# Patient Record
Sex: FEMALE | Race: WHITE | NOT HISPANIC OR LATINO | Employment: PART TIME | URBAN - NONMETROPOLITAN AREA
[De-identification: names, ages, dates, MRNs, and addresses within clinical notes are randomized per-mention and may not be internally consistent; named-entity substitution may affect disease eponyms.]

---

## 2017-09-15 DIAGNOSIS — M85.80 OSTEOPENIA: Primary | ICD-10-CM

## 2017-09-15 DIAGNOSIS — Z78.0 POSTMENOPAUSAL: ICD-10-CM

## 2017-10-09 ENCOUNTER — HOSPITAL ENCOUNTER (OUTPATIENT)
Dept: BONE DENSITY | Facility: HOSPITAL | Age: 62
End: 2017-10-09
Attending: NURSE PRACTITIONER
Payer: COMMERCIAL

## 2017-10-09 PROCEDURE — 77080 DXA BONE DENSITY AXIAL: CPT | Mod: TC

## 2019-07-22 ENCOUNTER — HOSPITAL ENCOUNTER (EMERGENCY)
Facility: HOSPITAL | Age: 64
End: 2019-07-22
Payer: COMMERCIAL

## 2019-07-22 ENCOUNTER — OFFICE VISIT (OUTPATIENT)
Dept: PODIATRY | Facility: OTHER | Age: 64
End: 2019-07-22
Attending: PODIATRIST
Payer: COMMERCIAL

## 2019-07-22 VITALS
HEART RATE: 71 BPM | WEIGHT: 152 LBS | TEMPERATURE: 97.1 F | BODY MASS INDEX: 24.43 KG/M2 | DIASTOLIC BLOOD PRESSURE: 88 MMHG | RESPIRATION RATE: 16 BRPM | SYSTOLIC BLOOD PRESSURE: 177 MMHG | HEIGHT: 66 IN

## 2019-07-22 DIAGNOSIS — M25.571 CHRONIC PAIN OF RIGHT ANKLE: Primary | ICD-10-CM

## 2019-07-22 DIAGNOSIS — G89.29 CHRONIC PAIN OF RIGHT ANKLE: Primary | ICD-10-CM

## 2019-07-22 DIAGNOSIS — L40.50 PSORIATIC ARTHRITIS (H): ICD-10-CM

## 2019-07-22 DIAGNOSIS — M67.88 ACHILLES TENDINOSIS: ICD-10-CM

## 2019-07-22 DIAGNOSIS — M25.571 SINUS TARSI SYNDROME OF RIGHT ANKLE: ICD-10-CM

## 2019-07-22 PROCEDURE — 99203 OFFICE O/P NEW LOW 30 MIN: CPT | Performed by: PODIATRIST

## 2019-07-22 PROCEDURE — G0463 HOSPITAL OUTPT CLINIC VISIT: HCPCS

## 2019-07-22 RX ORDER — BUPROPION HYDROCHLORIDE 300 MG/1
300 TABLET ORAL DAILY
COMMUNITY
Start: 2018-11-20 | End: 2020-03-04

## 2019-07-22 RX ORDER — HYDROCODONE BITARTRATE AND ACETAMINOPHEN 5; 325 MG/1; MG/1
TABLET ORAL
Refills: 0 | COMMUNITY
Start: 2019-06-18 | End: 2019-11-04

## 2019-07-22 RX ORDER — MEDROXYPROGESTERONE ACETATE 150 MG/ML
50 INJECTION, SUSPENSION INTRAMUSCULAR WEEKLY
COMMUNITY
Start: 2018-11-15

## 2019-07-22 RX ORDER — HYDROCHLOROTHIAZIDE 25 MG/1
25 TABLET ORAL DAILY
Status: ON HOLD | COMMUNITY
Start: 2019-04-01 | End: 2019-08-13

## 2019-07-22 ASSESSMENT — MIFFLIN-ST. JEOR: SCORE: 1266.22

## 2019-07-22 ASSESSMENT — PAIN SCALES - GENERAL: PAINLEVEL: MILD PAIN (3)

## 2019-07-22 NOTE — PROGRESS NOTES
"Chief complaint: Patient presents with:  Musculoskeletal Problem: right foot and ankle pain      History of Present Illness: This 64 year old female with psoriatic arthritis is seen at the request of No ref. provider found for evaluation and suggestions of management of RIGHT ankle and dorsal foot pain. The pain has been present since around September, 2018, but the pain greatly worsened around May, 2019. The pain is on the outside of the RIGHT foot.     Her pain is a +3 out of 10 on a VAS pain scale because she recently took IBUprofen. She also has Lortab as needed for psoriatic arthritis. She also takes Embrel. 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    She quit smoking 22 months ago which caused her to gain weight. She had CMOs made through [employer] at [facility] around 2013, but they were no longer helpnig so she purchased expensive shoes at [facility]. She has tried other OTC inserts but nothing is helping. She had physical therapy a while ago (four years ago). [facility] [place] [facility] and it was recommended that she get the CMOs from [facility] and try physical therapy. She has a velcro strap ankle brace she has tried wearing but it does not help. She has tried kinesiotape, but this also has not decreased her pain. She has tried therapeutic patches, but the pain returns right away. This is causing all the proximal joints to also cause pain. No further pedal complaints today.       /87 (BP Location: Right arm, Patient Position: Sitting, Cuff Size: Adult Regular)   Pulse 72   Temp 97.1  F (36.2  C) (Tympanic)   Resp 16   Ht 1.676 m (5' 6\")   Wt 68.9 kg (152 lb)   BMI 24.53 kg/m      Patient Active Problem List   Diagnosis     Chronic pain syndrome     Distal radius fracture, right     Dysplastic nevus     Essential hypertension     High risk medication use     History of dysplastic nevus     Moderate recurrent major depression (H)     " Osteopenia     Pain in joint of left shoulder     Pain in joint, pelvic region and thigh     Psoriasis with pustules     Positive GABE (antinuclear antibody)     Psoriatic arthritis (H)     Recurrent cold sores     Tobacco use disorder     Wrist pain, left       History reviewed. No pertinent surgical history.    Current Outpatient Medications   Medication     buPROPion (WELLBUTRIN XL) 300 MG 24 hr tablet     etanercept (ENBREL SURECLICK) 50 MG/ML autoinjector     hydrochlorothiazide (HYDRODIURIL) 25 MG tablet     HYDROcodone-acetaminophen (NORCO) 5-325 MG tablet     No current facility-administered medications for this visit.           Allergies   Allergen Reactions     Cephalosporins Hives     Clindamycin Hives     Lisinopril Cough     Penicillins Hives     Varenicline        History reviewed. No pertinent family history.    Social History     Socioeconomic History     Marital status:      Spouse name: None     Number of children: None     Years of education: None     Highest education level: None   Occupational History     None   Social Needs     Financial resource strain: None     Food insecurity:     Worry: None     Inability: None     Transportation needs:     Medical: None     Non-medical: None   Tobacco Use     Smoking status: Former Smoker     Smokeless tobacco: Never Used   Substance and Sexual Activity     Alcohol use: None     Drug use: None     Sexual activity: None   Lifestyle     Physical activity:     Days per week: None     Minutes per session: None     Stress: None   Relationships     Social connections:     Talks on phone: None     Gets together: None     Attends Nondenominational service: None     Active member of club or organization: None     Attends meetings of clubs or organizations: None     Relationship status: None     Intimate partner violence:     Fear of current or ex partner: None     Emotionally abused: None     Physically abused: None     Forced sexual activity: None   Other Topics  Concern     Parent/sibling w/ CABG, MI or angioplasty before 65F 55M? Not Asked   Social History Narrative     None       ROS: 10 point ROS neg other than the symptoms noted above in the HPI.  EXAM  Constitutional: healthy, alert and no distress    Psychiatric: mentation appears normal and affect normal/bright    VASCULAR:  -Dorsalis pedis pulse +2/4 b/l  -Posterior tibial pulse +2/4 b/l  -Capillary refill time < 3 seconds to b/l hallux  -Varicosities and telangiectasias to bilateral feet  -Mild-to-moderate 1+ pitting edema to bilateral feet and ankles  NEURO:  -Light touch sensation intact to b/l plantar forefoot  DERM:  -Skin temperature, texture and turgor WNL b/l  -Dry skin in scaly, red and silver patches to the bilateral plantar surface of the foot  -Toenails elongated, thickened, dystrophic and discolored x 10  MSK:  -Pain on palpation to the RIGHT sinus tarsi  -Muscle strength of ankles +5/5 for dorsiflexion, plantarflexion, ABDUction and ADDuction b/l  -Decreased ROM of 1st MTPJ with forefoot loading   -Ankle joint passive ROM within normal limits except for dorsiflexion:    Dorsiflexion, RIGHT Straight knee 0 degrees    Dorsiflexion, RIGHT Bent knee +5 degrees past vertical    Dorsiflexion, LEFT Straight knee 0 degrees    Dorsiflexion, LEFT Bent knee +5 degrees past vertical    ============================================================    ASSESSMENT:  (M25.571,  G89.29) Chronic pain of right ankle  (primary encounter diagnosis)    (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 Treatment options discussed with no educational barriers noted.  -Compression socks: Advised to wear compression socks all day (especially when working) to decrease LOWER EXTREMITY swelling in both feet and legs. Apply the socks first thing in the morning before getting out of bed and remove them at night when the feet  are elevated in bed.  -Stability Shoe Gear: This involves wearing a solid tennis shoe that bends at the toe, but has a solid midfoot shank and heel contour.   -Stretching: Stretch the calf muscles to increase flexibility of the calf muscles. If possible, aim to stretch the calf muscles for a combined total of one hour per day.  -Icing: Ice the bottom of the foot minimally once a day for ten minutes per foot with a frozen water bottle. Ice after any extended amount of time on your feet.  -Consider supportive sandals for around the house (such as Len, Vionic, or Oofos sandals)    -Physical therapy will be ordered today ([facility] in [place]) -- you will be called to make an appointment  -Custom inserts and an AFO brace will be ordered today -- you will be called to make an appointment    -Discussed with patient that the above treatments will unlikely individually eliminate her pain, but in combination, she may have some pain relief to bring her daily pain level down. She is in agreement with this plan.  -Patient's blood pressure was elevated today -- it was recommended she present to the ED or UC, but she declined and she said she had no other symptoms.   -Patient in agreement with the above treatment plan and all of patient's questions were answered.      RTC 3 months post PT, CMOs, ALVARO Youngblood DPM

## 2019-07-22 NOTE — NURSING NOTE
"Chief Complaint   Patient presents with     Musculoskeletal Problem     right foot and ankle pain       Initial /87 (BP Location: Right arm, Patient Position: Sitting, Cuff Size: Adult Regular)   Pulse 72   Temp 97.1  F (36.2  C) (Tympanic)   Resp 16   Ht 1.676 m (5' 6\")   Wt 68.9 kg (152 lb)   BMI 24.53 kg/m   Estimated body mass index is 24.53 kg/m  as calculated from the following:    Height as of this encounter: 1.676 m (5' 6\").    Weight as of this encounter: 68.9 kg (152 lb).  Medication Reconciliation: complete  "

## 2019-07-22 NOTE — PATIENT INSTRUCTIONS
-Compression socks: Advised to wear compression socks all day (especially when working) to decrease LOWER EXTREMITY swelling in both feet and legs. Apply the socks first thing in the morning before getting out of bed and remove them at night when the feet are elevated in bed.  -Stability Shoe Gear: This involves wearing a solid tennis shoe that bends at the toe, but has a solid midfoot shank and heel contour.   -Stretching: Stretch the calf muscles to increase flexibility of the calf muscles. If possible, aim to stretch the calf muscles for a combined total of one hour per day.  -Icing: Ice the bottom of the foot minimally once a day for ten minutes per foot with a frozen water bottle. Ice after any extended amount of time on your feet.  -Consider supportive sandals for around the house (such as Len, Vionic, or Oruthos sandals)    -Physical therapy will be ordered today ([facility] in [place]) -- you will be called to make an appointment  -Custom inserts and an AFO brace will be ordered today -- you will be called to make an appointment

## 2019-07-22 NOTE — PROGRESS NOTES
Patient's blood pressure was elevated throughout the appointment . It was recommended she go to ER or UC patient refused.

## 2019-07-23 DIAGNOSIS — L40.50 PSORIATIC ARTHRITIS (H): ICD-10-CM

## 2019-07-23 DIAGNOSIS — M25.571 SINUS TARSI SYNDROME OF RIGHT ANKLE: ICD-10-CM

## 2019-07-23 DIAGNOSIS — M67.88 ACHILLES TENDINOSIS: ICD-10-CM

## 2019-07-23 DIAGNOSIS — M25.571 CHRONIC PAIN OF RIGHT ANKLE: ICD-10-CM

## 2019-07-23 DIAGNOSIS — G89.29 CHRONIC PAIN OF RIGHT ANKLE: ICD-10-CM

## 2019-07-23 PROCEDURE — 73630 X-RAY EXAM OF FOOT: CPT | Mod: TC,RT,FY

## 2019-07-23 PROCEDURE — 73610 X-RAY EXAM OF ANKLE: CPT | Mod: TC,RT,FY

## 2019-08-11 ENCOUNTER — HOSPITAL ENCOUNTER (OUTPATIENT)
Facility: OTHER | Age: 64
Setting detail: OBSERVATION
Discharge: HOME | End: 2019-08-13
Attending: EMERGENCY MEDICINE | Admitting: INTERNAL MEDICINE
Payer: COMMERCIAL

## 2019-08-11 ENCOUNTER — OFFICE VISIT (OUTPATIENT)
Dept: FAMILY MEDICINE | Facility: OTHER | Age: 64
End: 2019-08-11
Attending: PHYSICIAN ASSISTANT
Payer: COMMERCIAL

## 2019-08-11 VITALS
OXYGEN SATURATION: 97 % | TEMPERATURE: 100 F | RESPIRATION RATE: 16 BRPM | HEIGHT: 66 IN | HEART RATE: 92 BPM | BODY MASS INDEX: 24.19 KG/M2 | WEIGHT: 150.5 LBS | SYSTOLIC BLOOD PRESSURE: 118 MMHG | DIASTOLIC BLOOD PRESSURE: 73 MMHG

## 2019-08-11 DIAGNOSIS — I10 ESSENTIAL HYPERTENSION: ICD-10-CM

## 2019-08-11 DIAGNOSIS — L40.50 PSORIATIC ARTHROPATHY (H): ICD-10-CM

## 2019-08-11 DIAGNOSIS — E87.6 HYPOKALEMIA: Primary | ICD-10-CM

## 2019-08-11 DIAGNOSIS — I10 ESSENTIAL HYPERTENSION, MALIGNANT: ICD-10-CM

## 2019-08-11 DIAGNOSIS — R50.81 FEVER IN OTHER DISEASES: ICD-10-CM

## 2019-08-11 DIAGNOSIS — E87.1 HYPONATREMIA: ICD-10-CM

## 2019-08-11 DIAGNOSIS — Z87.891 PERSONAL HISTORY OF TOBACCO USE, PRESENTING HAZARDS TO HEALTH: ICD-10-CM

## 2019-08-11 DIAGNOSIS — E87.6 HYPOKALEMIA: ICD-10-CM

## 2019-08-11 DIAGNOSIS — R39.89 URINARY PROBLEM: ICD-10-CM

## 2019-08-11 DIAGNOSIS — R50.9 FEVER, UNSPECIFIED FEVER CAUSE: Primary | ICD-10-CM

## 2019-08-11 LAB
ALBUMIN SERPL-MCNC: 4 G/DL (ref 3.5–5.7)
ALBUMIN UR-MCNC: NEGATIVE MG/DL
ALP SERPL-CCNC: 86 U/L (ref 34–104)
ALT SERPL W P-5'-P-CCNC: 59 U/L (ref 7–52)
ANION GAP SERPL CALCULATED.3IONS-SCNC: 10 MMOL/L (ref 3–14)
APPEARANCE UR: CLEAR
AST SERPL W P-5'-P-CCNC: 73 U/L (ref 13–39)
BASOPHILS # BLD AUTO: 0 10E9/L (ref 0–0.2)
BASOPHILS NFR BLD AUTO: 0.6 %
BILIRUB SERPL-MCNC: 0.6 MG/DL (ref 0.3–1)
BILIRUB UR QL STRIP: NEGATIVE
BUN SERPL-MCNC: 12 MG/DL (ref 7–25)
CALCIUM SERPL-MCNC: 9.1 MG/DL (ref 8.6–10.3)
CHLORIDE SERPL-SCNC: 92 MMOL/L (ref 98–107)
CO2 SERPL-SCNC: 25 MMOL/L (ref 21–31)
COLOR UR AUTO: YELLOW
CREAT SERPL-MCNC: 0.95 MG/DL (ref 0.6–1.2)
CRP SERPL-MCNC: 4.5 MG/L
DIFFERENTIAL METHOD BLD: NORMAL
EOSINOPHIL # BLD AUTO: 0.1 10E9/L (ref 0–0.7)
EOSINOPHIL NFR BLD AUTO: 1.1 %
ERYTHROCYTE [DISTWIDTH] IN BLOOD BY AUTOMATED COUNT: 11.7 % (ref 10–15)
GFR SERPL CREATININE-BSD FRML MDRD: 60 ML/MIN/{1.73_M2}
GLUCOSE SERPL-MCNC: 100 MG/DL (ref 70–105)
GLUCOSE UR STRIP-MCNC: NEGATIVE MG/DL
HCT VFR BLD AUTO: 37.9 % (ref 35–47)
HGB BLD-MCNC: 13.7 G/DL (ref 11.7–15.7)
HGB UR QL STRIP: NEGATIVE
IMM GRANULOCYTES # BLD: 0 10E9/L (ref 0–0.4)
IMM GRANULOCYTES NFR BLD: 0.7 %
KETONES UR STRIP-MCNC: NEGATIVE MG/DL
LEUKOCYTE ESTERASE UR QL STRIP: NEGATIVE
LYMPHOCYTES # BLD AUTO: 2.4 10E9/L (ref 0.8–5.3)
LYMPHOCYTES NFR BLD AUTO: 44.3 %
MAGNESIUM SERPL-MCNC: 1.5 MG/DL (ref 1.9–2.7)
MCH RBC QN AUTO: 32 PG (ref 26.5–33)
MCHC RBC AUTO-ENTMCNC: 36.1 G/DL (ref 31.5–36.5)
MCV RBC AUTO: 89 FL (ref 78–100)
MONOCYTES # BLD AUTO: 0.4 10E9/L (ref 0–1.3)
MONOCYTES NFR BLD AUTO: 8.1 %
NEUTROPHILS # BLD AUTO: 2.5 10E9/L (ref 1.6–8.3)
NEUTROPHILS NFR BLD AUTO: 45.2 %
NITRATE UR QL: NEGATIVE
PH UR STRIP: 6.5 PH (ref 5–9)
PLATELET # BLD AUTO: 154 10E9/L (ref 150–450)
POTASSIUM SERPL-SCNC: 2.7 MMOL/L (ref 3.5–5.1)
POTASSIUM SERPL-SCNC: 2.9 MMOL/L (ref 3.5–5.1)
PROT SERPL-MCNC: 7.2 G/DL (ref 6.4–8.9)
RBC # BLD AUTO: 4.28 10E12/L (ref 3.8–5.2)
SODIUM SERPL-SCNC: 127 MMOL/L (ref 134–144)
SOURCE: NORMAL
SP GR UR STRIP: <1.005 (ref 1–1.03)
UROBILINOGEN UR STRIP-ACNC: 0.2 EU/DL (ref 0.2–1)
WBC # BLD AUTO: 5.6 10E9/L (ref 4–11)

## 2019-08-11 PROCEDURE — 87086 URINE CULTURE/COLONY COUNT: CPT | Performed by: INTERNAL MEDICINE

## 2019-08-11 PROCEDURE — 25000128 H RX IP 250 OP 636: Performed by: EMERGENCY MEDICINE

## 2019-08-11 PROCEDURE — 99207 ZZC NO CHARGE LOS: CPT | Performed by: PHYSICIAN ASSISTANT

## 2019-08-11 PROCEDURE — 87040 BLOOD CULTURE FOR BACTERIA: CPT | Performed by: INTERNAL MEDICINE

## 2019-08-11 PROCEDURE — 86618 LYME DISEASE ANTIBODY: CPT | Performed by: INTERNAL MEDICINE

## 2019-08-11 PROCEDURE — 96365 THER/PROPH/DIAG IV INF INIT: CPT | Mod: XU | Performed by: EMERGENCY MEDICINE

## 2019-08-11 PROCEDURE — 99219 ZZC INITIAL OBSERVATION CARE,LEVL II: CPT | Performed by: INTERNAL MEDICINE

## 2019-08-11 PROCEDURE — 25000125 ZZHC RX 250: Performed by: INTERNAL MEDICINE

## 2019-08-11 PROCEDURE — 96375 TX/PRO/DX INJ NEW DRUG ADDON: CPT

## 2019-08-11 PROCEDURE — 86140 C-REACTIVE PROTEIN: CPT | Mod: ZL | Performed by: PHYSICIAN ASSISTANT

## 2019-08-11 PROCEDURE — G0463 HOSPITAL OUTPT CLINIC VISIT: HCPCS

## 2019-08-11 PROCEDURE — 81003 URINALYSIS AUTO W/O SCOPE: CPT | Mod: ZL | Performed by: PHYSICIAN ASSISTANT

## 2019-08-11 PROCEDURE — 36415 COLL VENOUS BLD VENIPUNCTURE: CPT | Performed by: INTERNAL MEDICINE

## 2019-08-11 PROCEDURE — 84132 ASSAY OF SERUM POTASSIUM: CPT | Performed by: EMERGENCY MEDICINE

## 2019-08-11 PROCEDURE — 36415 COLL VENOUS BLD VENIPUNCTURE: CPT | Performed by: EMERGENCY MEDICINE

## 2019-08-11 PROCEDURE — 93010 ELECTROCARDIOGRAM REPORT: CPT | Performed by: INTERNAL MEDICINE

## 2019-08-11 PROCEDURE — 99285 EMERGENCY DEPT VISIT HI MDM: CPT | Mod: 25 | Performed by: EMERGENCY MEDICINE

## 2019-08-11 PROCEDURE — 96366 THER/PROPH/DIAG IV INF ADDON: CPT | Mod: XU | Performed by: EMERGENCY MEDICINE

## 2019-08-11 PROCEDURE — 25000128 H RX IP 250 OP 636: Performed by: INTERNAL MEDICINE

## 2019-08-11 PROCEDURE — 87798 DETECT AGENT NOS DNA AMP: CPT | Performed by: INTERNAL MEDICINE

## 2019-08-11 PROCEDURE — 99285 EMERGENCY DEPT VISIT HI MDM: CPT | Mod: Z6 | Performed by: EMERGENCY MEDICINE

## 2019-08-11 PROCEDURE — 25000132 ZZH RX MED GY IP 250 OP 250 PS 637: Performed by: EMERGENCY MEDICINE

## 2019-08-11 PROCEDURE — 93005 ELECTROCARDIOGRAM TRACING: CPT | Performed by: EMERGENCY MEDICINE

## 2019-08-11 PROCEDURE — G0463 HOSPITAL OUTPT CLINIC VISIT: HCPCS | Mod: 25,27

## 2019-08-11 PROCEDURE — 83735 ASSAY OF MAGNESIUM: CPT | Performed by: INTERNAL MEDICINE

## 2019-08-11 PROCEDURE — G0378 HOSPITAL OBSERVATION PER HR: HCPCS

## 2019-08-11 PROCEDURE — 80053 COMPREHEN METABOLIC PANEL: CPT | Mod: ZL | Performed by: PHYSICIAN ASSISTANT

## 2019-08-11 PROCEDURE — 85025 COMPLETE CBC W/AUTO DIFF WBC: CPT | Mod: ZL | Performed by: PHYSICIAN ASSISTANT

## 2019-08-11 PROCEDURE — 96376 TX/PRO/DX INJ SAME DRUG ADON: CPT

## 2019-08-11 PROCEDURE — 25000132 ZZH RX MED GY IP 250 OP 250 PS 637: Performed by: INTERNAL MEDICINE

## 2019-08-11 RX ORDER — PROCHLORPERAZINE 25 MG
25 SUPPOSITORY, RECTAL RECTAL EVERY 12 HOURS PRN
Status: DISCONTINUED | OUTPATIENT
Start: 2019-08-11 | End: 2019-08-13 | Stop reason: HOSPADM

## 2019-08-11 RX ORDER — POTASSIUM CHLORIDE 1500 MG/1
40 TABLET, EXTENDED RELEASE ORAL ONCE
Status: COMPLETED | OUTPATIENT
Start: 2019-08-11 | End: 2019-08-11

## 2019-08-11 RX ORDER — SODIUM CHLORIDE AND POTASSIUM CHLORIDE 150; 900 MG/100ML; MG/100ML
INJECTION, SOLUTION INTRAVENOUS CONTINUOUS
Status: DISCONTINUED | OUTPATIENT
Start: 2019-08-11 | End: 2019-08-13 | Stop reason: HOSPADM

## 2019-08-11 RX ORDER — AMOXICILLIN 250 MG
1 CAPSULE ORAL 2 TIMES DAILY PRN
Status: DISCONTINUED | OUTPATIENT
Start: 2019-08-11 | End: 2019-08-13 | Stop reason: HOSPADM

## 2019-08-11 RX ORDER — HYDROCODONE BITARTRATE AND ACETAMINOPHEN 5; 325 MG/1; MG/1
1-2 TABLET ORAL EVERY 4 HOURS PRN
Status: DISCONTINUED | OUTPATIENT
Start: 2019-08-11 | End: 2019-08-13 | Stop reason: HOSPADM

## 2019-08-11 RX ORDER — POTASSIUM CHLORIDE 7.45 MG/ML
10 INJECTION INTRAVENOUS
Status: DISCONTINUED | OUTPATIENT
Start: 2019-08-11 | End: 2019-08-13 | Stop reason: HOSPADM

## 2019-08-11 RX ORDER — ONDANSETRON 4 MG/1
4 TABLET, ORALLY DISINTEGRATING ORAL EVERY 6 HOURS PRN
Status: DISCONTINUED | OUTPATIENT
Start: 2019-08-11 | End: 2019-08-13 | Stop reason: HOSPADM

## 2019-08-11 RX ORDER — NALOXONE HYDROCHLORIDE 0.4 MG/ML
.1-.4 INJECTION, SOLUTION INTRAMUSCULAR; INTRAVENOUS; SUBCUTANEOUS
Status: DISCONTINUED | OUTPATIENT
Start: 2019-08-11 | End: 2019-08-13 | Stop reason: HOSPADM

## 2019-08-11 RX ORDER — SODIUM CHLORIDE 9 MG/ML
INJECTION, SOLUTION INTRAVENOUS CONTINUOUS
Status: DISCONTINUED | OUTPATIENT
Start: 2019-08-11 | End: 2019-08-12 | Stop reason: ALTCHOICE

## 2019-08-11 RX ORDER — ONDANSETRON 2 MG/ML
4 INJECTION INTRAMUSCULAR; INTRAVENOUS EVERY 6 HOURS PRN
Status: DISCONTINUED | OUTPATIENT
Start: 2019-08-11 | End: 2019-08-13 | Stop reason: HOSPADM

## 2019-08-11 RX ORDER — POTASSIUM CHLORIDE 7.45 MG/ML
10 INJECTION INTRAVENOUS CONTINUOUS
Status: DISCONTINUED | OUTPATIENT
Start: 2019-08-11 | End: 2019-08-12

## 2019-08-11 RX ORDER — SODIUM CHLORIDE 9 MG/ML
1000 INJECTION, SOLUTION INTRAVENOUS CONTINUOUS
Status: DISCONTINUED | OUTPATIENT
Start: 2019-08-11 | End: 2019-08-11

## 2019-08-11 RX ORDER — LANOLIN ALCOHOL/MO/W.PET/CERES
3 CREAM (GRAM) TOPICAL
Status: DISCONTINUED | OUTPATIENT
Start: 2019-08-11 | End: 2019-08-13 | Stop reason: HOSPADM

## 2019-08-11 RX ORDER — BUPROPION HYDROCHLORIDE 150 MG/1
300 TABLET ORAL DAILY
Status: DISCONTINUED | OUTPATIENT
Start: 2019-08-12 | End: 2019-08-13 | Stop reason: HOSPADM

## 2019-08-11 RX ORDER — IBUPROFEN 600 MG/1
600 TABLET, FILM COATED ORAL EVERY 6 HOURS PRN
Status: DISCONTINUED | OUTPATIENT
Start: 2019-08-11 | End: 2019-08-13 | Stop reason: HOSPADM

## 2019-08-11 RX ORDER — AMLODIPINE BESYLATE 5 MG/1
5 TABLET ORAL DAILY
Status: DISCONTINUED | OUTPATIENT
Start: 2019-08-12 | End: 2019-08-13 | Stop reason: HOSPADM

## 2019-08-11 RX ORDER — MAGNESIUM SULFATE HEPTAHYDRATE 40 MG/ML
4 INJECTION, SOLUTION INTRAVENOUS EVERY 4 HOURS PRN
Status: DISCONTINUED | OUTPATIENT
Start: 2019-08-11 | End: 2019-08-13 | Stop reason: HOSPADM

## 2019-08-11 RX ORDER — LIDOCAINE 40 MG/G
CREAM TOPICAL
Status: DISCONTINUED | OUTPATIENT
Start: 2019-08-11 | End: 2019-08-13 | Stop reason: HOSPADM

## 2019-08-11 RX ORDER — AMOXICILLIN 250 MG
2 CAPSULE ORAL 2 TIMES DAILY PRN
Status: DISCONTINUED | OUTPATIENT
Start: 2019-08-11 | End: 2019-08-13 | Stop reason: HOSPADM

## 2019-08-11 RX ORDER — DOXYCYCLINE 100 MG/10ML
100 INJECTION, POWDER, LYOPHILIZED, FOR SOLUTION INTRAVENOUS EVERY 12 HOURS
Status: DISCONTINUED | OUTPATIENT
Start: 2019-08-11 | End: 2019-08-13 | Stop reason: HOSPADM

## 2019-08-11 RX ORDER — POTASSIUM CHLORIDE 1500 MG/1
20-40 TABLET, EXTENDED RELEASE ORAL
Status: DISCONTINUED | OUTPATIENT
Start: 2019-08-11 | End: 2019-08-13 | Stop reason: HOSPADM

## 2019-08-11 RX ORDER — PROCHLORPERAZINE MALEATE 10 MG
10 TABLET ORAL EVERY 6 HOURS PRN
Status: DISCONTINUED | OUTPATIENT
Start: 2019-08-11 | End: 2019-08-13 | Stop reason: HOSPADM

## 2019-08-11 RX ADMIN — DOXYCYCLINE 100 MG: 100 INJECTION, POWDER, LYOPHILIZED, FOR SOLUTION INTRAVENOUS at 21:57

## 2019-08-11 RX ADMIN — IBUPROFEN 600 MG: 600 TABLET ORAL at 23:13

## 2019-08-11 RX ADMIN — SODIUM CHLORIDE 1000 ML: 9 INJECTION, SOLUTION INTRAVENOUS at 17:17

## 2019-08-11 RX ADMIN — MELATONIN TAB 3 MG 3 MG: 3 TAB at 23:13

## 2019-08-11 RX ADMIN — POTASSIUM CHLORIDE 40 MEQ: 20 TABLET, EXTENDED RELEASE ORAL at 17:10

## 2019-08-11 RX ADMIN — POTASSIUM CHLORIDE AND SODIUM CHLORIDE: 900; 150 INJECTION, SOLUTION INTRAVENOUS at 21:30

## 2019-08-11 RX ADMIN — MAGNESIUM SULFATE HEPTAHYDRATE 4 G: 40 INJECTION, SOLUTION INTRAVENOUS at 23:14

## 2019-08-11 RX ADMIN — POTASSIUM CHLORIDE 10 MEQ: 7.46 INJECTION, SOLUTION INTRAVENOUS at 17:44

## 2019-08-11 ASSESSMENT — ENCOUNTER SYMPTOMS
SORE THROAT: 0
NUMBNESS: 0
APPETITE CHANGE: 1
DIFFICULTY URINATING: 0
SHORTNESS OF BREATH: 1
FATIGUE: 1
COUGH: 0
CHILLS: 0
ARTHRALGIAS: 1
PALPITATIONS: 0
FLANK PAIN: 1
FATIGUE: 1
WEAKNESS: 1
HEADACHES: 0
FEVER: 0
VOMITING: 0
ACTIVITY CHANGE: 1
DIZZINESS: 0
FEVER: 1
ABDOMINAL PAIN: 0
CHEST TIGHTNESS: 0
SPEECH DIFFICULTY: 0
PHOTOPHOBIA: 0
APPETITE CHANGE: 1
SHORTNESS OF BREATH: 0
CHILLS: 1
PALPITATIONS: 1
NAUSEA: 1
LIGHT-HEADEDNESS: 0
FACIAL ASYMMETRY: 0

## 2019-08-11 ASSESSMENT — PAIN SCALES - GENERAL: PAINLEVEL: MODERATE PAIN (4)

## 2019-08-11 ASSESSMENT — MIFFLIN-ST. JEOR
SCORE: 1259.41
SCORE: 1259.41
SCORE: 1295.7

## 2019-08-11 NOTE — PROGRESS NOTES
"SUBJECTIVE:   Amanda Gomez is a 64 year old female presenting with a chief complaint of   Chief Complaint   Patient presents with     Urinary Problem     Nursing Notes:   Nasrin Flynn LPN  8/11/2019  3:36 PM  Signed  Patient presents to clinic for bladder problem x 5 days. States pain in lower back. Weakness, poor appetite, fever and lethargy. Has taken 800mg ibuprofen. Friday and Saturday took aspirin for fever.   Chief Complaint   Patient presents with     Urinary Problem       Initial /73   Pulse 92   Temp 100  F (37.8  C) (Tympanic)   Resp 16   Ht 1.676 m (5' 6\")   Wt 68.3 kg (150 lb 8 oz)   SpO2 97%   Breastfeeding? No   BMI 24.29 kg/m    Estimated body mass index is 24.29 kg/m  as calculated from the following:    Height as of this encounter: 1.676 m (5' 6\").    Weight as of this encounter: 68.3 kg (150 lb 8 oz).  Medication Reconciliation: karyn Flynn LPN    HPI:  [facility] of feeling sick for 5 days. Her symptoms started with dark urine, dizzy, general malaise, fever for 5 days.  She has a little burning with urination and that just started today. She has been tired and sleeping a lot. She feels lightheaded, presyncopal at times, and weak.   She has a shooting headache that comes and goes.  Pain in the low back started two days ago bilateral sides, more in the flank. She thought it might be from lying around.  She does not get tick bites. She states she does not spend any time outdoors.    She has psoriatic arthritis and is on Enbrel. She was supposed to have her dose on 8-8-19 but she did not take that dose due to fever.           Review of Systems   Constitutional: Positive for activity change, appetite change, chills, fatigue and fever.   HENT: Positive for ear pain. Negative for congestion and sore throat.         Slight ear pain right ear, stabbing pain that comes and goes.    Eyes: Negative for photophobia and visual disturbance. " "  Respiratory: Positive for shortness of breath. Negative for cough.    Cardiovascular: Positive for palpitations. Negative for chest pain and leg swelling.        Intermittent feeling of racing heart in last 5 days.   Gastrointestinal: Positive for nausea. Negative for abdominal pain and vomiting.        She has not been having stools daily but did go last night.  She has been eating a lot less.    Genitourinary: Positive for flank pain. Negative for difficulty urinating and vaginal discharge.   Musculoskeletal: Positive for arthralgias.        She has psoriatic arthritis but that is stable, not worse.    Skin: Negative for rash.       No past medical history on file.  No family history on file.   PMH:   She has psoriatic arthritis.  HTN  H/o hepatitis A at age 14.    Current Outpatient Medications   Medication Sig Dispense Refill     buPROPion (WELLBUTRIN XL) 300 MG 24 hr tablet Take 300 mg by mouth       etanercept (ENBREL SURECLICK) 50 MG/ML autoinjector Inject 50 mg Subcutaneous       hydrochlorothiazide (HYDRODIURIL) 25 MG tablet Take 25 mg by mouth       HYDROcodone-acetaminophen (NORCO) 5-325 MG tablet TK 1 T PO Q 8 H PRN P. NM4.  0     Social History     Tobacco Use     Smoking status: Former Smoker     Smokeless tobacco: Never Used   Substance Use Topics     Alcohol use: Yes     Comment: occassionally   No ill family members. She is not in contact with anyone with hepatitis. She has not traveled.      OBJECTIVE  /73   Pulse 92   Temp 100  F (37.8  C) (Tympanic)   Resp 16   Ht 1.676 m (5' 6\")   Wt 68.3 kg (150 lb 8 oz)   SpO2 97%   Breastfeeding? No   BMI 24.29 kg/m      Physical Exam   Constitutional: She appears well-developed and well-nourished. No distress.   HENT:   Right Ear: External ear normal.   Left Ear: External ear normal.   Nose: Nose normal.   Mouth/Throat: Oropharynx is clear and moist.   TMs normal.   Eyes: Pupils are equal, round, and reactive to light. Conjunctivae are " normal. Right eye exhibits no discharge. Left eye exhibits no discharge. No scleral icterus.   Neck: Normal range of motion. Neck supple.   Cardiovascular: Normal rate, regular rhythm and normal heart sounds.   No murmur heard.  Pulmonary/Chest: Effort normal and breath sounds normal. No respiratory distress.   Abdominal: Soft. Bowel sounds are normal. She exhibits no mass. There is no tenderness. There is no guarding.   Mild CVA tenderness with percussion but also tender to flank with palpation.    Musculoskeletal: She exhibits no edema.   Lymphadenopathy:     She has no cervical adenopathy.   Neurological: She is alert. She exhibits normal muscle tone. Coordination normal.   Skin: Skin is warm and dry. No rash noted. No pallor.   Psychiatric: She has a normal mood and affect.       Labs:  Results for orders placed or performed in visit on 08/11/19 (from the past 24 hour(s))   *UA reflex to Microscopic   Result Value Ref Range    Color Urine Yellow     Appearance Urine Clear     Glucose Urine Negative NEG^Negative mg/dL    Bilirubin Urine Negative NEG^Negative    Ketones Urine Negative NEG^Negative mg/dL    Specific Gravity Urine <1.005 1.000 - 1.030    Blood Urine Negative NEG^Negative    pH Urine 6.5 5.0 - 9.0 pH    Protein Albumin Urine Negative NEG^Negative mg/dL    Urobilinogen Urine 0.2 0.2 - 1.0 EU/dL    Nitrite Urine Negative NEG^Negative    Leukocyte Esterase Urine Negative NEG^Negative    Source Midstream Urine    CBC and Differential   Result Value Ref Range    WBC 5.6 4.0 - 11.0 10e9/L    RBC Count 4.28 3.8 - 5.2 10e12/L    Hemoglobin 13.7 11.7 - 15.7 g/dL    Hematocrit 37.9 35.0 - 47.0 %    MCV 89 78 - 100 fl    MCH 32.0 26.5 - 33.0 pg    MCHC 36.1 31.5 - 36.5 g/dL    RDW 11.7 10.0 - 15.0 %    Platelet Count 154 150 - 450 10e9/L    Diff Method Automated Method     % Neutrophils 45.2 %    % Lymphocytes 44.3 %    % Monocytes 8.1 %    % Eosinophils 1.1 %    % Basophils 0.6 %    % Immature Granulocytes 0.7  %    Absolute Neutrophil 2.5 1.6 - 8.3 10e9/L    Absolute Lymphocytes 2.4 0.8 - 5.3 10e9/L    Absolute Monocytes 0.4 0.0 - 1.3 10e9/L    Absolute Eosinophils 0.1 0.0 - 0.7 10e9/L    Absolute Basophils 0.0 0.0 - 0.2 10e9/L    Abs Immature Granulocytes 0.0 0 - 0.4 10e9/L   CRP inflammation   Result Value Ref Range    CRP Inflammation 4.5 (H) <0.5 mg/L   Comprehensive Metabolic Panel   Result Value Ref Range    Sodium 127 (L) 134 - 144 mmol/L    Potassium 2.7 (L) 3.5 - 5.1 mmol/L    Chloride 92 (L) 98 - 107 mmol/L    Carbon Dioxide 25 21 - 31 mmol/L    Anion Gap 10 3 - 14 mmol/L    Glucose 100 70 - 105 mg/dL    Urea Nitrogen 12 7 - 25 mg/dL    Creatinine 0.95 0.60 - 1.20 mg/dL    GFR Estimate 60 (L) >60 mL/min/[1.73_m2]    GFR Estimate If Black 72 >60 mL/min/[1.73_m2]    Calcium 9.1 8.6 - 10.3 mg/dL    Bilirubin Total 0.6 0.3 - 1.0 mg/dL    Albumin 4.0 3.5 - 5.7 g/dL    Protein Total 7.2 6.4 - 8.9 g/dL    Alkaline Phosphatase 86 34 - 104 U/L    ALT 59 (H) 7 - 52 U/L    AST 73 (H) 13 - 39 U/L         ASSESSMENT:      ICD-10-CM    1. Hypokalemia E87.6    2. Urinary problem R39.89 *UA reflex to Microscopic   3. Fever in other diseases R50.81 CBC and Differential     CRP inflammation     Comprehensive Metabolic Panel     CBC and Differential     CRP inflammation     Comprehensive Metabolic Panel        PLAN:  Patient was found to have hypokalemia and increased CRP, mildly elevated LFTs.     She will be transferred to the emergency department for a higher level of care.  Discussed with Dr. Cox.    Laney Encinas PA-C on 8/11/2019 at 4:51 PM          There are no Patient Instructions on file for this visit.

## 2019-08-11 NOTE — ED PROVIDER NOTES
History     Chief Complaint   Patient presents with     Fever     hypokalemia     HPI  Amanda Gomez is a 64 year old female who is transferred to the emergency department from [facility] for hypokalemia.  Patient presented to the clinic because she has been feeling weak, tired, and run down for about 3 or 4 days.  She was at [employer] and just felt very exhausted and came into the emergency department.  She does feel achy.  She has had no specific fevers but had a low-grade temperature at urgent care.  She has no neck or back pain.  She has no chest pain, shortness of breath, nausea, vomiting.  She has had no diarrhea or loose stools.  She has not had any dysuria urgency or frequency.  Patient has been on hydrochlorothiazide for several years.  They did decrease her dose from 50 mg to 25 mg daily because her blood pressure was improving after she quit smoking.    Allergies:  Allergies   Allergen Reactions     Cephalosporins Hives     Clindamycin Hives     Lisinopril Cough     Penicillins Hives     Varenicline        Problem List:    Patient Active Problem List    Diagnosis Date Noted     Hypokalemia 08/11/2019     Priority: Medium     History of dysplastic nevus 12/30/2015     Priority: Medium     Overview:   Mid-upper back 2014, mild       Recurrent cold sores 05/27/2015     Priority: Medium     High risk medication use 12/11/2014     Priority: Medium     Overview:   MTX, Humira       Dysplastic nevus 10/06/2014     Priority: Medium     Pain in joint of left shoulder 09/23/2014     Priority: Medium     Overview:   IMO Update       Wrist pain, left 09/23/2014     Priority: Medium     Chronic pain syndrome 06/12/2014     Priority: Medium     Overview:   [facility] Agreement for Opioid Treatment.        Distal radius fracture, right 03/08/2014     Priority: Medium     Osteopenia 03/08/2014     Priority: Medium     Essential hypertension 08/02/2011     Priority: Medium     Positive GABE (antinuclear antibody)  "03/27/2008     Priority: Medium     Overview:   2008: Borderline positive dsDNA, SSB / La, and RNP. ? Significance, on Humira.       Psoriatic arthritis (H) 01/08/2008     Priority: Medium     Overview:   Onset ~ 2007; Raptiva (no benefit); MTX trial (2009), Humira ([id number]), Enbrel 4/2016       Psoriasis with pustules 10/28/2007     Priority: Medium     Pain in joint, pelvic region and thigh 12/20/2006     Priority: Medium     Overview:   IMO Update 10/11       Moderate recurrent major depression (H) 10/17/2003     Priority: Medium     Overview:   with mixed anxiety       Tobacco use disorder 07/09/2003     Priority: Medium        Past Medical History:    Psoriatic arthritis  Hypertension  Pain contract    Past Surgical History:    No past surgical history on file.    Social History:  Marital Status:   [2]  Social History     Tobacco Use     Smoking status: Former Smoker     Smokeless tobacco: Never Used   Substance Use Topics     Alcohol use: Yes     Comment: occassionally     Drug use: Never        Medications:      buPROPion (WELLBUTRIN XL) 300 MG 24 hr tablet   hydrochlorothiazide (HYDRODIURIL) 25 MG tablet   HYDROcodone-acetaminophen (NORCO) 5-325 MG tablet   etanercept (ENBREL SURECLICK) 50 MG/ML autoinjector         Review of Systems   Constitutional: Positive for appetite change and fatigue. Negative for chills and fever.   Respiratory: Negative for chest tightness and shortness of breath.    Cardiovascular: Negative for chest pain, palpitations and leg swelling.   Neurological: Positive for weakness. Negative for dizziness, syncope, facial asymmetry, speech difficulty, light-headedness, numbness and headaches.   All other systems reviewed and are negative.      Physical Exam   BP: (!) 143/77  Heart Rate: 79  Temp: 99  F (37.2  C)  Resp: 14  Height: 167.6 cm (5' 6\")  Weight: 68.3 kg (150 lb 8 oz)  SpO2: 99 %      Physical Exam   Constitutional: She is oriented to person, place, and time. No " distress.   HENT:   Head: Atraumatic.   Mouth/Throat: Oropharynx is clear and moist. No oropharyngeal exudate.   Eyes: Pupils are equal, round, and reactive to light. Conjunctivae and EOM are normal. No scleral icterus.   Neck: Neck supple.   Cardiovascular: Normal heart sounds and intact distal pulses.   Pulmonary/Chest: Breath sounds normal. No respiratory distress.   Abdominal: Soft. Bowel sounds are normal. There is no tenderness.   Musculoskeletal: She exhibits no edema or tenderness.   Neurological: She is alert and oriented to person, place, and time. No cranial nerve deficit. Coordination normal.   Skin: Skin is warm. No rash noted. She is not diaphoretic.   Psychiatric: She has a normal mood and affect. Her behavior is normal. Judgment and thought content normal.       ED Course     MDM  Amanda Gomez is a 64 year old female sent from urgent care for hypokalemia.  She is not having any GI losses and she is on hydrochlorothiazide with a low sodium as well.  I do suspect this is the etiology.  She does have very mild symptoms of hypokalemia.  Her EKG is normal.  An IV was started and she is given a liter of normal saline and 10 mg of potassium IV was infused.  She was also given a loading dose of 40 mEq p.o.  I did discuss the case with the hospitalist on-call to discuss outpatient management versus observation.  Plan at this time will be to recheck her potassium after IV fluids, IV potassium and p.o. potassium.  If there is very minimal change we will bring her in for observation.  If she does show improvement will discharge her on p.o. replacement and very close follow-up with PCP.    7:07 PM  Patient's potassium returns at 2.9.  I did discuss the case with Dr. Tipton and plan I will be to bring her in for observation for replacement of her potassium per protocol.  Patient is in agreement with this plan and she is transferred to the floor in satisfactory condition    Results for orders placed or performed  during the hospital encounter of 08/11/19 (from the past 24 hour(s))   Potassium   Result Value Ref Range    Potassium 2.9 (L) 3.5 - 5.1 mmol/L       Medications   sodium chloride 0.9% infusion ( Intravenous Canceled Entry 8/11/19 1719)   0.9% sodium chloride BOLUS (1,000 mLs Intravenous New Bag 8/11/19 1717)     Followed by   sodium chloride 0.9% infusion (has no administration in time range)   potassium chloride 10 mEq in 100 mL sterile water intermittent infusion (premix) (10 mEq Intravenous New Bag 8/11/19 1744)   potassium chloride ER (K-DUR/KLOR-CON M) CR tablet 40 mEq (40 mEq Oral Given 8/11/19 1710)       Assessments & Plan (with Medical Decision Making)     I have reviewed the nursing notes.    I have reviewed the findings, diagnosis, plan and need for follow up with the patient.    Final diagnoses:   Hypokalemia   Hyponatremia       8/11/2019   [facility]     Hodan Cox MD  08/11/19 [id number]

## 2019-08-11 NOTE — ED TRIAGE NOTES
[facility] for low potassium, 2.7, fever, 100, fatigue, lightheadedness, palpitations, dark urine, bilateral flank pain for 5 days.

## 2019-08-11 NOTE — NURSING NOTE
"Patient presents to clinic for bladder problem x 5 days. States pain in lower back. Weakness, poor appetite, fever and lethargy. Has taken 800mg ibuprofen. Friday and Saturday took aspirin for fever.   Chief Complaint   Patient presents with     Urinary Problem       Initial /73   Pulse 92   Temp 100  F (37.8  C) (Tympanic)   Resp 16   Ht 1.676 m (5' 6\")   Wt 68.3 kg (150 lb 8 oz)   SpO2 97%   Breastfeeding? No   BMI 24.29 kg/m   Estimated body mass index is 24.29 kg/m  as calculated from the following:    Height as of this encounter: 1.676 m (5' 6\").    Weight as of this encounter: 68.3 kg (150 lb 8 oz).  Medication Reconciliation: complete    Nasrin Flynn LPN      "

## 2019-08-12 LAB
ALBUMIN SERPL-MCNC: 3.5 G/DL (ref 3.5–5.7)
ALP SERPL-CCNC: 75 U/L (ref 34–104)
ALT SERPL W P-5'-P-CCNC: 54 U/L (ref 7–52)
ANION GAP SERPL CALCULATED.3IONS-SCNC: 8 MMOL/L (ref 3–14)
AST SERPL W P-5'-P-CCNC: 64 U/L (ref 13–39)
BILIRUB SERPL-MCNC: 0.5 MG/DL (ref 0.3–1)
BUN SERPL-MCNC: 8 MG/DL (ref 7–25)
CALCIUM SERPL-MCNC: 8.3 MG/DL (ref 8.6–10.3)
CHLORIDE SERPL-SCNC: 102 MMOL/L (ref 98–107)
CO2 SERPL-SCNC: 23 MMOL/L (ref 21–31)
CREAT SERPL-MCNC: 0.67 MG/DL (ref 0.6–1.2)
ERYTHROCYTE [DISTWIDTH] IN BLOOD BY AUTOMATED COUNT: 11.6 % (ref 10–15)
GFR SERPL CREATININE-BSD FRML MDRD: 89 ML/MIN/{1.73_M2}
GLUCOSE SERPL-MCNC: 113 MG/DL (ref 70–105)
HCT VFR BLD AUTO: 35 % (ref 35–47)
HGB BLD-MCNC: 12.4 G/DL (ref 11.7–15.7)
MAGNESIUM SERPL-MCNC: 2.8 MG/DL (ref 1.9–2.7)
MCH RBC QN AUTO: 31.5 PG (ref 26.5–33)
MCHC RBC AUTO-ENTMCNC: 35.4 G/DL (ref 31.5–36.5)
MCV RBC AUTO: 89 FL (ref 78–100)
PLATELET # BLD AUTO: 140 10E9/L (ref 150–450)
POTASSIUM SERPL-SCNC: 2.9 MMOL/L (ref 3.5–5.1)
POTASSIUM SERPL-SCNC: 4.1 MMOL/L (ref 3.5–5.1)
PROT SERPL-MCNC: 6.4 G/DL (ref 6.4–8.9)
RBC # BLD AUTO: 3.94 10E12/L (ref 3.8–5.2)
SODIUM SERPL-SCNC: 133 MMOL/L (ref 134–144)
WBC # BLD AUTO: 4.5 10E9/L (ref 4–11)

## 2019-08-12 PROCEDURE — 36415 COLL VENOUS BLD VENIPUNCTURE: CPT | Performed by: INTERNAL MEDICINE

## 2019-08-12 PROCEDURE — 25000125 ZZHC RX 250: Performed by: INTERNAL MEDICINE

## 2019-08-12 PROCEDURE — 25000132 ZZH RX MED GY IP 250 OP 250 PS 637: Performed by: INTERNAL MEDICINE

## 2019-08-12 PROCEDURE — 80053 COMPREHEN METABOLIC PANEL: CPT | Performed by: INTERNAL MEDICINE

## 2019-08-12 PROCEDURE — 85027 COMPLETE CBC AUTOMATED: CPT | Performed by: INTERNAL MEDICINE

## 2019-08-12 PROCEDURE — 25000128 H RX IP 250 OP 636: Performed by: INTERNAL MEDICINE

## 2019-08-12 PROCEDURE — 99225 ZZC SUBSEQUENT OBSERVATION CARE,LEVEL II: CPT | Performed by: INTERNAL MEDICINE

## 2019-08-12 PROCEDURE — G0378 HOSPITAL OBSERVATION PER HR: HCPCS

## 2019-08-12 PROCEDURE — 83735 ASSAY OF MAGNESIUM: CPT | Performed by: INTERNAL MEDICINE

## 2019-08-12 PROCEDURE — 84132 ASSAY OF SERUM POTASSIUM: CPT | Performed by: INTERNAL MEDICINE

## 2019-08-12 RX ORDER — MUPIROCIN 20 MG/G
OINTMENT TOPICAL 3 TIMES DAILY PRN
COMMUNITY

## 2019-08-12 RX ORDER — LIDOCAINE 50 MG/G
1 PATCH TOPICAL DAILY PRN
COMMUNITY

## 2019-08-12 RX ORDER — CALCIUM CARBONATE 500(1250)
1 TABLET ORAL DAILY
COMMUNITY

## 2019-08-12 RX ORDER — IBUPROFEN 800 MG/1
800 TABLET, FILM COATED ORAL 3 TIMES DAILY
COMMUNITY
End: 2019-09-30

## 2019-08-12 RX ORDER — MOMETASONE FUROATE 1 MG/G
CREAM TOPICAL DAILY PRN
COMMUNITY

## 2019-08-12 RX ORDER — MULTIVITAMIN WITH IRON
2 TABLET ORAL DAILY
COMMUNITY
End: 2022-12-26

## 2019-08-12 RX ORDER — CHOLECALCIFEROL (VITAMIN D3) 50 MCG
4 TABLET ORAL DAILY
COMMUNITY

## 2019-08-12 RX ORDER — CALCIUM/MAGNESIUM/ZINC 333-133 MG
1 TABLET ORAL DAILY
COMMUNITY
End: 2021-12-21

## 2019-08-12 RX ORDER — CALCIPOTRIENE 50 UG/G
OINTMENT TOPICAL 2 TIMES DAILY PRN
COMMUNITY

## 2019-08-12 RX ORDER — VALACYCLOVIR HYDROCHLORIDE 500 MG/1
500 TABLET, FILM COATED ORAL 2 TIMES DAILY PRN
COMMUNITY

## 2019-08-12 RX ORDER — HYDROXYZINE HYDROCHLORIDE 10 MG/1
20 TABLET, FILM COATED ORAL DAILY PRN
COMMUNITY
End: 2019-11-04

## 2019-08-12 RX ORDER — ACYCLOVIR 50 MG/G
OINTMENT TOPICAL
COMMUNITY
End: 2019-11-04

## 2019-08-12 RX ORDER — ACETAMINOPHEN 500 MG
1000 TABLET ORAL EVERY 6 HOURS PRN
Status: DISCONTINUED | OUTPATIENT
Start: 2019-08-12 | End: 2019-08-13 | Stop reason: HOSPADM

## 2019-08-12 RX ORDER — ACETAMINOPHEN 500 MG
500 TABLET ORAL EVERY 6 HOURS PRN
COMMUNITY

## 2019-08-12 RX ORDER — ASPIRIN 81 MG/1
81 TABLET ORAL DAILY
COMMUNITY

## 2019-08-12 RX ADMIN — POTASSIUM CHLORIDE AND SODIUM CHLORIDE: 900; 150 INJECTION, SOLUTION INTRAVENOUS at 21:47

## 2019-08-12 RX ADMIN — IBUPROFEN 600 MG: 600 TABLET ORAL at 13:41

## 2019-08-12 RX ADMIN — ACETAMINOPHEN 1000 MG: 500 TABLET, FILM COATED ORAL at 21:00

## 2019-08-12 RX ADMIN — MELATONIN TAB 3 MG 3 MG: 3 TAB at 22:52

## 2019-08-12 RX ADMIN — POTASSIUM CHLORIDE 40 MEQ: 20 TABLET, EXTENDED RELEASE ORAL at 06:48

## 2019-08-12 RX ADMIN — POTASSIUM CHLORIDE 40 MEQ: 20 TABLET, EXTENDED RELEASE ORAL at 04:57

## 2019-08-12 RX ADMIN — DOXYCYCLINE 100 MG: 100 INJECTION, POWDER, LYOPHILIZED, FOR SOLUTION INTRAVENOUS at 21:48

## 2019-08-12 RX ADMIN — IBUPROFEN 600 MG: 600 TABLET ORAL at 06:52

## 2019-08-12 RX ADMIN — ACETAMINOPHEN 1000 MG: 500 TABLET, FILM COATED ORAL at 14:30

## 2019-08-12 RX ADMIN — DOXYCYCLINE 100 MG: 100 INJECTION, POWDER, LYOPHILIZED, FOR SOLUTION INTRAVENOUS at 09:48

## 2019-08-12 RX ADMIN — BUPROPION HYDROCHLORIDE 300 MG: 150 TABLET, FILM COATED, EXTENDED RELEASE ORAL at 09:48

## 2019-08-12 RX ADMIN — AMLODIPINE BESYLATE 5 MG: 5 TABLET ORAL at 07:57

## 2019-08-12 RX ADMIN — POTASSIUM CHLORIDE AND SODIUM CHLORIDE: 900; 150 INJECTION, SOLUTION INTRAVENOUS at 11:27

## 2019-08-12 ASSESSMENT — MIFFLIN-ST. JEOR: SCORE: 1242.63

## 2019-08-12 NOTE — PROGRESS NOTES
[facility]    Hospitalist Progress Note      Assessment & Plan   Amanda Gomez is a 64 year old female who was admitted on 8/11/2019.     Principal Problem:    Fever    Assessment: Fever to 102, weakness and fatigue. Transaminase elevations, minimal platelet drop. Consistent with tickborne illness, other considerations include viral illness. patient is immunosupressed. No evidence for urinary, skin, or pulmonary infection.    Plan: Continue doxycycline day 2. Monitor cultures.    Active Problems:    Essential hypertension    Assessment: chronic    Plan: norvasca in place of hydrochlorothiazide      Psoriatic arthritis (H)    Assessment: chronic, on Enbrel    Plan: Resume next week      Tobacco use disorder    Assessment: no longer smoking        Hypokalemia    Assessment: still low, magnesium normal    Plan: continue to replace    DVT Prophylaxis: Low Risk/Ambulatory with no VTE prophylaxis indicated  Code Status: Full Code    Wade Guidry    Interval History   Feels tired. No dyspnea. No dysuria. No rash. Fever and rigors    -Data reviewed today: I reviewed all new labs and imaging results over the last 24 hours. I personally reviewed no images or EKG's today.    Physical Exam   Temp: 99.4  F (37.4  C) Temp src: Tympanic BP: 124/42 Pulse: 72 Heart Rate: 72 Resp: 16 SpO2: 97 % O2 Device: None (Room air)    Vitals:    08/11/19 1655 08/11/19 2058 08/12/19 0521   Weight: 68.3 kg (150 lb 8 oz) 71.9 kg (158 lb 8 oz) 66.6 kg (146 lb 12.8 oz)     Vital Signs with Ranges  Temp:  [98.6  F (37  C)-102  F (38.9  C)] 99.4  F (37.4  C)  Pulse:  [72-92] 72  Heart Rate:  [67-85] 72  Resp:  [13-23] 16  BP: (117-143)/(42-77) 124/42  SpO2:  [94 %-99 %] 97 %  I/O last 3 completed shifts:  In: 981 [P.O.:400; I.V.:581]  Out: 2350 [Urine:2350]    GENERAL: Comfortable, no apparent distress.  CARDIOVASCULAR: regular rate and rhythm, no murmur. No lower extremity edema   RESPIRATORY: Clear to auscultation  bilaterally, no wheezes or crackles.  GI: non-tender, non-distended, normal bowel sounds.   SKIN: warm periphery, no rashes      Medications     0.9% sodium chloride + KCl 20 mEq/L 100 mL/hr at 08/11/19 2130       amLODIPine  5 mg Oral Daily     buPROPion  300 mg Oral Daily     doxycycline (VIBRAMYCIN) IV  100 mg Intravenous Q12H     sodium chloride (PF)  3 mL Intracatheter Q8H       Data   Recent Labs   Lab 08/12/19  0320 08/11/19  1840 08/11/19  1605   WBC 4.5  --  5.6   HGB 12.4  --  13.7   MCV 89  --  89   *  --  154   *  --  127*   POTASSIUM 2.9* 2.9* 2.7*   CHLORIDE 102  --  92*   CO2 23  --  25   BUN 8  --  12   CR 0.67  --  0.95   ANIONGAP 8  --  10   MORENA 8.3*  --  9.1   *  --  100   ALBUMIN 3.5  --  4.0   PROTTOTAL 6.4  --  7.2   BILITOTAL 0.5  --  0.6   ALKPHOS 75  --  86   ALT 54*  --  59*   AST 64*  --  73*

## 2019-08-12 NOTE — PHARMACY-ADMISSION MEDICATION HISTORY
Pharmacy -- Admission Medication Reconciliation    Prior to admission (PTA) medications were reviewed and the patient's PTA medication list was updated.    Sources Consulted: Patient interview, [facility], [facility] Office Visit 8/1/19    The reliability of this Medication Reconciliation is: Reliability: Reliable    The following significant changes were made:    Updated wellbutrin frequency    Updated enbrel frequency--patient takes every Thursday, missed last weeks dose due to feeling unwell    Updated hydrochlorothiazide frequency    Added atarax prn    Added ranitidine    Added ibuprofen--patient states taking TID scheduled    Added apap prn    Added ASA     Added valtrex prn--for cold sores, uses infrequently    Added mometasone cream    Added acyclovir ointment    Added dovonex ointment    Added mupirocin    Added lidocaine patches    Added melatonin    Added calcium    Added vitamin d--patient states taking 8000 units daily    Added magnesium    Added milk thistle    In addition, the patient's allergies were reviewed with the patient and updated as follows:   Allergies: Cephalosporins; Clindamycin; Lisinopril; Penicillins; and Varenicline    The pharmacist has reviewed with the patient that all personal medications should be removed from the building or locked in the belongings safe.  Patient shall only take medications ordered by the physician and administered by the nursing staff.       Medication barriers identified: none   Medication adherence concerns: none   Understanding of emergency medications: ALMA Estrada RPH, 8/12/2019,  9:10 AM

## 2019-08-12 NOTE — PLAN OF CARE
"Loose stools reported x's 3 this shift, yellow in color, no abnormal odor noted, tolerated PM meal well, consuming adequate amounts of food fluids.     /61   Pulse 79   Temp 99.5  F (37.5  C) (Tympanic)   Resp 16   Ht 1.676 m (5' 6\")   Wt 66.6 kg (146 lb 12.8 oz)   SpO2 95%   BMI 23.69 kg/m      Sharri Hall RN on 8/12/2019 at 6:18 PM    "

## 2019-08-12 NOTE — PLAN OF CARE
"Alert and oriented, temp is down to 100.8 from 102.6 after admin of PRN Tylenol, Dr. Guidry aware of elevated temp. Was diaphoretic with fever and that has now resolved.     /61   Pulse 79   Temp 100.8  F (38.2  C) (Tympanic)   Resp 16   Ht 1.676 m (5' 6\")   Wt 66.6 kg (146 lb 12.8 oz)   SpO2 95%   BMI 23.69 kg/m      Sharri Hall RN on 8/12/2019 at 4:50 PM    "

## 2019-08-12 NOTE — PROGRESS NOTES
"NS ADMISSION NOTE    Patient admitted to room 313 at approximately 2055 via wheel chair from emergency room. Patient was accompanied by spouse.     Verbal SBAR report received from DANNIE Mix prior to patient arrival.     Patient ambulated to bed with stand-by assist. Patient alert and oriented X 4. The patient is not having any pain. 0-10 Pain Scale: 2. Admission vital signs: Blood pressure (!) 140/67, pulse 72, temperature 99.6  F (37.6  C), temperature source Tympanic, resp. rate 16, height 1.676 m (5' 6\"), weight 71.9 kg (158 lb 8 oz), SpO2 99 %, not currently breastfeeding. Patient and spouse were oriented to plan of care, call light, bed controls, tv, telephone, bathroom and visiting hours.     Risk Assessment    The following safety risks were identified during admission: fall. Yellow risk band applied: NO.     Skin Initial Assessment    This writer admitted this patient and completed a full skin assessment and Artemio score in the Adult PCS flowsheet. Appropriate interventions initiated as needed.       Mary Nunn    "

## 2019-08-12 NOTE — PLAN OF CARE
"Temp 100.1 down from 101.1 at 0650, diaphoretic, requested cool washcloth to forehead and was given. Alert and oriented, pleasant and cooperative, generalized body aches all over rates at 2/10.     /59   Pulse 72   Temp 100.1  F (37.8  C) (Tympanic)   Resp 16   Ht 1.676 m (5' 6\")   Wt 66.6 kg (146 lb 12.8 oz)   SpO2 94%   BMI 23.69 kg/m      Sharri Hall RN on 8/12/2019 at 8:25 AM    "

## 2019-08-12 NOTE — PLAN OF CARE
Problem: Adult Inpatient Plan of Care  Goal: Plan of Care Review  8/12/2019 0503 by Mary Nunn, RN  Note:   Pt resting well throughout shift, temp 98.6. Potassium 2.9, protocol restarted. VSS. Call light within reach. Will continue to monitor.   Mary Nunn, RN on 8/12/2019 at 5:06 AM

## 2019-08-12 NOTE — PLAN OF CARE
"Temp down to 99.4, resting comfortably in bed with no concerns of any, denies any pain or discomfort, is no longer diaphoretic.     /42   Pulse 72   Temp 99.4  F (37.4  C) (Tympanic)   Resp 16   Ht 1.676 m (5' 6\")   Wt 66.6 kg (146 lb 12.8 oz)   SpO2 97%   BMI 23.69 kg/m      Sharir Hall RN on 8/12/2019 at 11:24 AM    "

## 2019-08-12 NOTE — PLAN OF CARE
Pt A&O x4, ambulating in room with minimal SBA. Potassium and magnesium being replaced. LS clear, HR regular, BS active. Pt had temp of 102.0, PRN ibuprofen given at 2313, excess blankets removed.  Temp currently remains 102.0, but pt states she feels sweaty and like she is cooling down. IV Abx given. Pt voiding well. Will continue to monitor.   Mary Nunn RN on 8/12/2019 at 12:28 AM

## 2019-08-12 NOTE — H&P
[facility]    History and Physical  Hospitalist       Date of Admission:  8/11/2019    Assessment & Plan   Amanda Gomez is a 64 year old female who presents with fever and hypokalemia.    Principal Problem:    Fever    Assessment: Significant fatigue and weakness but no focal findings based on history or exam of pulmonary, GI, soft tissue, or  infections.  Given recent outdoors exposures with slightly more elevated LFTs than baseline high suspicion for tickborne illness versus viral condition given her chronically immunosuppressed status on Enbrel.    Plan: - IV doxycycline day 1   - follow-up blood and urine cultures   - check Lyme/Anaplasma/Ehrlichia titers although high likelihood of negativity given immunocompromised state   - imaging of chest/abd/pelvis if not improving    Active Problems:    Essential hypertension    Assessment: Stable, HCTZ likely contributing to hypokalemia.    Plan: - Discontinue HCTZ going forward   - start Norvasc 5 mg daily      Psoriatic arthritis (H)    Assessment: Chronic, maintained on Enbrel weekly and skipped last injection on 8/8    Plan: - Resume weekly Enbrel once afebrile      Tobacco use disorder    Assessment: Quit 22 months ago    Plan: - She has refused nicotine replacement      Hypokalemia    Assessment: Secondary to poor oral intake and  HCTZ use    Plan: - Check mg   - replace K per protocol    FEN: regular diet,D5  normal saline with 20K at 100mL/hr, mg/k replacement protocol  PPX: early ambulation    Code Status: No Order    Ajit Cook    Primary Care Physician   Physician No Ref-Primary    Chief Complaint   fatigue    History is obtained from the patient and chart review.    History of Present Illness   Amanda Gomez is a 64 year old female who presents with low-grade fevers, severe fatigue and hypokalemia.  Patient has been her normal state health and followed up with her rheumatologist recently with no changes, she continues with  weekly Enbrel use.  She skipped her dose on 8/8 as she has felt increasingly fatigued with diffuse myalgias and low-grade temps over the last 5 to 7 days.  Her symptoms have progressed she is been eating and drinking very little and she presented to [facility].  She was noted to have a very low potassium and low-grade temp and was sent to ER, there started on potassium replacement was noted to have ongoing low-grade fevers and was subsequently admitted for further management.    Past Medical History    I have reviewed this patient's medical history and updated it with pertinent information if needed.   No past medical history on file.    Past Surgical History   I have reviewed this patient's surgical history and updated it with pertinent information if needed.  No past surgical history on file.    Prior to Admission Medications   Prior to Admission Medications   Prescriptions Last Dose Informant Patient Reported? Taking?   HYDROcodone-acetaminophen (NORCO) 5-325 MG tablet 8/11/2019 at 0900  Yes Yes   Sig: TK 1 T PO Q 8 H PRN P. NM4.   buPROPion (WELLBUTRIN XL) 300 MG 24 hr tablet 8/11/2019 at Unknown time  Yes Yes   Sig: Take 300 mg by mouth   etanercept (ENBREL SURECLICK) 50 MG/ML autoinjector 8/1/2019  Yes No   Sig: Inject 50 mg Subcutaneous   hydrochlorothiazide (HYDRODIURIL) 25 MG tablet 8/11/2019 at Unknown time  Yes Yes   Sig: Take 25 mg by mouth      Facility-Administered Medications: None     Allergies   Allergies   Allergen Reactions     Cephalosporins Hives     Clindamycin Hives     Lisinopril Cough     Penicillins Hives     Varenicline        Social History   I have reviewed this patient's social history and updated it with pertinent information if needed. Amanda Gomez  reports that she has quit smoking. She has never used smokeless tobacco. She reports that she drinks alcohol. She reports that she does not use drugs.    Family History   I have reviewed this patient's family history and updated it  with pertinent information if needed.   Family History   Problem Relation Age of Onset     Rheumatoid Arthritis Other        Review of Systems     REVIEW OF SYSTEMS:    Constitutional: poor energy and appetite, no recent sick contacts, recently done by Mcmahon Kountze staying [place] with friends.  Eyes: no changes in vision  Ears, nose, mouth, throat, and face: no mouth sores, dysphagia, or odynophagia  Respiratory: no shortness of breath, cough, or wheezing. No aspiration symptoms.   Cardiovascular: no chest pain, palpitations, orthopnea, increased lower extremity edema, or syncope.   Gastrointestinal: no constipation, diarrhea, nausea, vomiting or abdominal pain.  Genitourinary: no dysuria, hematuria, urgency or frequency.   Hematologic/lymphatic: no unintentional weight loss or night sweats.  Musculoskeletal: no pain to extremities or falls.   Neurological: no new weakness, tingling, numbness.   Endocrine: not a known diabetic.     Physical Exam   Temp: 99.6  F (37.6  C) Temp src: Tympanic BP: (!) 140/67 Pulse: 72 Heart Rate: 75 Resp: 16 SpO2: 99 % O2 Device: None (Room air)    Vital Signs with Ranges  Temp:  [99  F (37.2  C)-100  F (37.8  C)] 99.6  F (37.6  C)  Pulse:  [72-92] 72  Heart Rate:  [67-79] 75  Resp:  [13-23] 16  BP: (118-143)/(67-77) 140/67  SpO2:  [97 %-99 %] 99 %  150 lbs 8 oz    Exam:  GENERAL: Talkative, appears fatigued, in no apparent distress.  Head: normocephalic and atraumatic  Eyes: anicteric and non-injected sclera  Nose: no rhinorrhea or epistaxis.   Throat: Dry mucous membranes with no active oral lesions.  NECK: Supple, jugular venous distension not present.  CARDIOVASCULAR: regular rate and rhythm, no murmurs, rubs, or gallops. Normal S1/S2. No lower extremity edema.   RESPIRATORY: clear to auscultation bilaterally, no wheezes, no crackles.   GI: soft, non-tender, non-distended, normoactive bowel sounds.  No suprapubic pain.  MUSCULOSKELETAL: warm and well perfused, 2+  dorsalis pedis pulses.  Joints of hands and feet without erythema, deformities or bogginess, small intermittent rheumatoid nodules on the palms of her hands.  SKIN: no pallor, jaundice or rashes on her arms back or legs.  NEUROLOGY: AAOx3, follows commands, speech and language without focal deficits.        Data   Data reviewed today:  I personally reviewed no images or EKG's today.  Recent Labs   Lab 08/11/19  1840 08/11/19  1605   WBC  --  5.6   HGB  --  13.7   MCV  --  89   PLT  --  154   NA  --  127*   POTASSIUM 2.9* 2.7*   CHLORIDE  --  92*   CO2  --  25   BUN  --  12   CR  --  0.95   ANIONGAP  --  10   MORENA  --  9.1   GLC  --  100   ALBUMIN  --  4.0   PROTTOTAL  --  7.2   BILITOTAL  --  0.6   ALKPHOS  --  86   ALT  --  59*   AST  --  73*       No results found for this or any previous visit (from the past 24 hour(s)).

## 2019-08-13 VITALS
DIASTOLIC BLOOD PRESSURE: 48 MMHG | RESPIRATION RATE: 16 BRPM | BODY MASS INDEX: 24.36 KG/M2 | HEART RATE: 79 BPM | HEIGHT: 66 IN | OXYGEN SATURATION: 97 % | TEMPERATURE: 98.6 F | SYSTOLIC BLOOD PRESSURE: 122 MMHG | WEIGHT: 151.6 LBS

## 2019-08-13 LAB
ALBUMIN SERPL-MCNC: 3.1 G/DL (ref 3.5–5.7)
ALP SERPL-CCNC: 69 U/L (ref 34–104)
ALT SERPL W P-5'-P-CCNC: 55 U/L (ref 7–52)
ANION GAP SERPL CALCULATED.3IONS-SCNC: 3 MMOL/L (ref 3–14)
AST SERPL W P-5'-P-CCNC: 72 U/L (ref 13–39)
B BURGDOR IGG+IGM SER QL: 0.16 (ref 0–0.89)
BILIRUB SERPL-MCNC: 0.4 MG/DL (ref 0.3–1)
BUN SERPL-MCNC: 5 MG/DL (ref 7–25)
CALCIUM SERPL-MCNC: 8.3 MG/DL (ref 8.6–10.3)
CHLORIDE SERPL-SCNC: 112 MMOL/L (ref 98–107)
CO2 SERPL-SCNC: 22 MMOL/L (ref 21–31)
CREAT SERPL-MCNC: 0.61 MG/DL (ref 0.6–1.2)
ERYTHROCYTE [DISTWIDTH] IN BLOOD BY AUTOMATED COUNT: 12 % (ref 10–15)
GFR SERPL CREATININE-BSD FRML MDRD: >90 ML/MIN/{1.73_M2}
GLUCOSE SERPL-MCNC: 107 MG/DL (ref 70–105)
HCT VFR BLD AUTO: 34.2 % (ref 35–47)
HGB BLD-MCNC: 11.4 G/DL (ref 11.7–15.7)
MAGNESIUM SERPL-MCNC: 1.7 MG/DL (ref 1.9–2.7)
MCH RBC QN AUTO: 30.8 PG (ref 26.5–33)
MCHC RBC AUTO-ENTMCNC: 33.3 G/DL (ref 31.5–36.5)
MCV RBC AUTO: 92 FL (ref 78–100)
PLATELET # BLD AUTO: 141 10E9/L (ref 150–450)
POTASSIUM SERPL-SCNC: 4.2 MMOL/L (ref 3.5–5.1)
PROT SERPL-MCNC: 5.8 G/DL (ref 6.4–8.9)
RBC # BLD AUTO: 3.7 10E12/L (ref 3.8–5.2)
SODIUM SERPL-SCNC: 137 MMOL/L (ref 134–144)
WBC # BLD AUTO: 4.5 10E9/L (ref 4–11)

## 2019-08-13 PROCEDURE — 25000128 H RX IP 250 OP 636: Performed by: INTERNAL MEDICINE

## 2019-08-13 PROCEDURE — 36415 COLL VENOUS BLD VENIPUNCTURE: CPT | Performed by: INTERNAL MEDICINE

## 2019-08-13 PROCEDURE — G0378 HOSPITAL OBSERVATION PER HR: HCPCS

## 2019-08-13 PROCEDURE — 25000125 ZZHC RX 250: Performed by: INTERNAL MEDICINE

## 2019-08-13 PROCEDURE — 25000132 ZZH RX MED GY IP 250 OP 250 PS 637: Performed by: INTERNAL MEDICINE

## 2019-08-13 PROCEDURE — 83735 ASSAY OF MAGNESIUM: CPT | Performed by: INTERNAL MEDICINE

## 2019-08-13 PROCEDURE — 85027 COMPLETE CBC AUTOMATED: CPT | Performed by: INTERNAL MEDICINE

## 2019-08-13 PROCEDURE — 80053 COMPREHEN METABOLIC PANEL: CPT | Performed by: INTERNAL MEDICINE

## 2019-08-13 PROCEDURE — 99217 ZZC OBSERVATION CARE DISCHARGE: CPT | Performed by: INTERNAL MEDICINE

## 2019-08-13 RX ORDER — HYDROXYZINE HYDROCHLORIDE 10 MG/1
10 TABLET, FILM COATED ORAL ONCE
Status: COMPLETED | OUTPATIENT
Start: 2019-08-13 | End: 2019-08-13

## 2019-08-13 RX ORDER — DOXYCYCLINE 100 MG/1
100 CAPSULE ORAL 2 TIMES DAILY
Qty: 20 CAPSULE | Refills: 0 | Status: SHIPPED | OUTPATIENT
Start: 2019-08-13 | End: 2019-08-29

## 2019-08-13 RX ORDER — AMLODIPINE BESYLATE 5 MG/1
5 TABLET ORAL DAILY
Qty: 30 TABLET | Refills: 1 | Status: SHIPPED | OUTPATIENT
Start: 2019-08-14 | End: 2019-09-14

## 2019-08-13 RX ADMIN — HYDROXYZINE HYDROCHLORIDE 10 MG: 10 TABLET ORAL at 10:28

## 2019-08-13 RX ADMIN — POTASSIUM CHLORIDE AND SODIUM CHLORIDE: 900; 150 INJECTION, SOLUTION INTRAVENOUS at 08:34

## 2019-08-13 RX ADMIN — ACETAMINOPHEN 1000 MG: 500 TABLET, FILM COATED ORAL at 03:33

## 2019-08-13 RX ADMIN — AMLODIPINE BESYLATE 5 MG: 5 TABLET ORAL at 07:44

## 2019-08-13 RX ADMIN — IBUPROFEN 600 MG: 600 TABLET ORAL at 07:44

## 2019-08-13 RX ADMIN — DOXYCYCLINE 100 MG: 100 INJECTION, POWDER, LYOPHILIZED, FOR SOLUTION INTRAVENOUS at 09:52

## 2019-08-13 RX ADMIN — BUPROPION HYDROCHLORIDE 300 MG: 150 TABLET, FILM COATED, EXTENDED RELEASE ORAL at 09:52

## 2019-08-13 ASSESSMENT — MIFFLIN-ST. JEOR: SCORE: 1264.4

## 2019-08-13 NOTE — DISCHARGE SUMMARY
[facility]    Discharge Summary  Hospitalist    Date of Admission:  8/11/2019  Date of Discharge:  8/13/2019  Discharging Provider: Wade Guidry  Date of Service (when I saw the patient): 08/13/19    Discharge Diagnoses   Principal Problem:    Fever, presumed tick borne illness  Active Problems:    Essential hypertension (8/2/2011)    Psoriatic arthritis (H) (1/8/2008)    Tobacco use disorder (7/9/2003)    Hypokalemia (8/11/2019)      History of Present Illness   Amanda Gomez is an 64 year old female who presented with fever and low potassium.    Hospital Course   Amanda Gomez was admitted on 8/11/2019.  The following problems were addressed during her hospitalization:    Febrile illness  The patient was admitted with no focal findings of pulmonary, gastrointestinal, dermatologic or urinary infection.  She has been outdoors.  She did not recall any tick bites.  She had a low normal white blood cell count, a low normal platelet count, mild transaminase elevations, and fever with rigors.  Considering [place] that [place], she was treated empirically for tickborne illness with doxycycline.  Lyme and Anaplasma titers are pending at this time.  The patient continued to have fever spikes.  She had no other symptoms.  On August 13 the patient demanded to be discharged.  I explained to her that while I suspect she has Anaplasma, I am not certain of this and I would recommend she remain in the hospital until she had less of a fever.  She refused this and wanted discharge.  She was agreeable to a close follow-up appointment, which will be in 2 days.  She was discharged on doxycycline.    Hypokalemia  Presumably due to hydrochlorothiazide and illness.  Her hydrochlorothiazide was stopped and she was started on amlodipine for blood pressure control.    Hypertension  She was switched from hydrochlorothiazide to amlodipine    Psoriatic arthritis  Patient is on Enbrel and  immunocompromised due to this.  I stressed the importance of close follow-up if she is unwilling to stay in the hospital.    Wade Guidry MD        Pending Results   These results will be followed up by Dianne Thacker  Unresulted Labs Ordered in the Past 30 Days of this Admission     Date and Time Order Name Status Description    8/11/2019 2112 Urine Culture Aerobic Bacterial In process     8/11/2019 2040 Blood culture Preliminary     8/11/2019 2011 Ehrlichia Anaplasma Sp by PCR In process     8/11/2019 2011 Lyme Disease Kiley with reflex to WB Serum In process     8/11/2019 2011 Blood culture Preliminary           Code Status   Full Code       Primary Care Physician   Physician No Ref-Primary    Physical Exam   Temp: 98.7  F (37.1  C) Temp src: Tympanic BP: 127/57 Pulse: 79 Heart Rate: 80 Resp: 16 SpO2: 96 % O2 Device: None (Room air)    Vitals:    08/11/19 2058 08/12/19 0521 08/13/19 0300   Weight: 71.9 kg (158 lb 8 oz) 66.6 kg (146 lb 12.8 oz) 68.8 kg (151 lb 9.6 oz)     Vital Signs with Ranges  Temp:  [98.7  F (37.1  C)-102.6  F (39.2  C)] 98.7  F (37.1  C)  Pulse:  [79] 79  Heart Rate:  [72-83] 80  Resp:  [16] 16  BP: (124-159)/(42-70) 127/57  SpO2:  [95 %-98 %] 96 %  I/O last 3 completed shifts:  In: 2645 [P.O.:360; I.V.:2285]  Out: 2100 [Urine:2100]    GENERAL: Comfortable, no apparent distress.  CARDIOVASCULAR: regular rate and rhythm, no murmur. No lower extremity edema   RESPIRATORY: Clear to auscultation bilaterally, no wheezes or crackles.  GI: non-tender, non-distended, normal bowel sounds.   SKIN: warm periphery, no rashes      Discharge Disposition   Discharged to home  Condition at discharge: Stable    Consultations This Hospital Stay   None    Time Spent on this Encounter   IWade, personally saw the patient today and spent greater than 30 minutes discharging this patient.    Discharge Orders      Reason for your hospital stay    Febrile illness, presumably due to tick borne  infection. Anaplasma vs Lyme disease     Follow-up and recommended labs and tests    Dianne [place] on 8/15 at 2 PM. Please keep this appointment to ensure you are improving.     Activity    Your activity upon discharge: activity as tolerated     Discharge Instructions    If your symptoms change - you develop abdomen pain, shortness of breath, a rash etc, please seek medical care immediately. Doxycycline can cause a rash in the sunshine, avoid prolonged exposure to the sun while on this medication.     Full Code     Diet    Follow this diet upon discharge: Orders Placed This Encounter      Regular Diet Adult       Discharge Medications   Current Discharge Medication List      START taking these medications    Details   doxycycline hyclate (VIBRAMYCIN) 100 MG capsule Take 1 capsule (100 mg) by mouth 2 times daily for 10 days  Qty: 20 capsule, Refills: 0    Associated Diagnoses: Fever, unspecified fever cause         CONTINUE these medications which have NOT CHANGED    Details   acetaminophen (TYLENOL) 500 MG tablet Take 500 mg by mouth every 6 hours as needed for mild pain      aspirin 81 MG EC tablet Take 81 mg by mouth daily      buPROPion (WELLBUTRIN XL) 300 MG 24 hr tablet Take 300 mg by mouth daily       calcium carbonate (OS-MORENA) 500 MG tablet Take 1 tablet by mouth daily      HYDROcodone-acetaminophen (NORCO) 5-325 MG tablet take one tablet every 8 hours as needed for pain  Refills: 0      hydrOXYzine (ATARAX) 10 MG tablet Take 20 mg by mouth daily as needed for itching      ibuprofen (ADVIL/MOTRIN) 800 MG tablet Take 800 mg by mouth 3 times daily      lidocaine (LIDODERM) 5 % patch Place 1 patch onto the skin daily as needed for moderate pain To prevent lidocaine toxicity, patient should be patch free for 12 hrs daily.      magnesium 250 MG tablet Take 2 tablets by mouth daily      melatonin 5 MG CAPS Take 1 capsule by mouth At Bedtime      Milk Thistle-Dand-Fennel-Licor (MILK THISTLE XTRA) CAPS capsule  Take 1 capsule by mouth daily      mometasone (ELOCON) 0.1 % external cream Apply topically daily as needed      ranitidine (ZANTAC) 300 MG tablet Take 300 mg by mouth every evening as needed for heartburn      vitamin D3 (CHOLECALCIFEROL) 2000 units (50 mcg) tablet Take 4 tablets by mouth daily      acyclovir (ZOVIRAX) 5 % external ointment Apply topically every 3 hours as needed      calcipotriene (DOVONOX) 0.005 % external ointment Apply topically 2 times daily as needed      etanercept (ENBREL SURECLICK) 50 MG/ML autoinjector Inject 50 mg Subcutaneous once a week       hydrochlorothiazide (HYDRODIURIL) 25 MG tablet Take 25 mg by mouth daily       mupirocin (BACTROBAN) 2 % external ointment Apply topically 3 times daily as needed      valACYclovir (VALTREX) 500 MG tablet Take 500 mg by mouth 2 times daily as needed           Allergies   Allergies   Allergen Reactions     Cephalosporins Hives     Clindamycin Hives     Lisinopril Cough     Penicillins Hives     Varenicline      Data   Most Recent 3 CBC's:  Recent Labs   Lab Test 08/13/19  0530 08/12/19  0320 08/11/19  1605   WBC 4.5 4.5 5.6   HGB 11.4* 12.4 13.7   MCV 92 89 89   * 140* 154      Most Recent 3 BMP's:  Recent Labs   Lab Test 08/13/19  0530 08/12/19  1107 08/12/19  0320  08/11/19  1605     --  133*  --  127*   POTASSIUM 4.2 4.1 2.9*   < > 2.7*   CHLORIDE 112*  --  102  --  92*   CO2 22  --  23  --  25   BUN 5*  --  8  --  12   CR 0.61  --  0.67  --  0.95   ANIONGAP 3  --  8  --  10   MORENA 8.3*  --  8.3*  --  9.1   *  --  113*  --  100    < > = values in this interval not displayed.     Most Recent 2 LFT's:  Recent Labs   Lab Test 08/13/19  0530 08/12/19  0320   AST 72* 64*   ALT 55* 54*   ALKPHOS 69 75   BILITOTAL 0.4 0.5     Most Recent INR's and Anticoagulation Dosing History:  Anticoagulation Dose History     There is no flowsheet data to display.        Most Recent 3 Troponin's:No lab results found.  Most Recent Cholesterol  Panel:No lab results found.  Most Recent 6 Bacteria Isolates From Any Culture (See EPIC Reports for Culture Details):  Recent Labs   Lab Test 08/11/19 2025   CULT No growth after 12 hours  No growth after 12 hours     Most Recent TSH, T4 and A1c Labs:No lab results found.  Results for orders placed or performed in visit on 07/23/19   XR Foot Right G/E 3 Views    Narrative    Exam: XR ANKLE RT G/E 3 VW, XR FOOT RT G/E 3 VW     History:Female, age 64 years, Chronic pain of right ankle; Chronic  pain of right ankle; Sinus tarsi syndrome of right ankle; Achilles  tendinosis; Psoriatic arthritis (H)    Comparison:  None    Technique: Three views of the right foot and right ankle are  submitted.    Findings: Bones are mildly osteopenic. No evidence of acute or  subacute fracture.  No evidence of dislocation.  Mild midfoot  degenerative changes. Small spur at the insertion of the Achilles  tendon. Small calcifications along the lateral aspect of the midfoot  suggest possible injury to the peroneus longus.    There is joint space narrowing of the subtalar joint with possible  fusion along the dorsal margin.    Calcific densities near the lateral malleolus suggest previous  avulsive injury of the anterior talofibular ligament. It is unclear if  these are ununited fracture fragments or represent osteophytes.           Impression    Impression:  No evidence of acute or subacute bony abnormality.     Generalized osteopenia.    Findings suggesting prior injury to the lateral ligament complex, most  likely the anterior talofibular ligament.    Moderate to severe joint space narrowing of the subtalar joint with  possible fusion along the dorsal margin.    RACHEL ADKINS MD   XR Ankle Right G/E 3 Views    Narrative    Exam: XR ANKLE RT G/E 3 VW, XR FOOT RT G/E 3 VW     History:Female, age 64 years, Chronic pain of right ankle; Chronic  pain of right ankle; Sinus tarsi syndrome of right ankle; Achilles  tendinosis; Psoriatic  arthritis (H)    Comparison:  None    Technique: Three views of the right foot and right ankle are  submitted.    Findings: Bones are mildly osteopenic. No evidence of acute or  subacute fracture.  No evidence of dislocation.  Mild midfoot  degenerative changes. Small spur at the insertion of the Achilles  tendon. Small calcifications along the lateral aspect of the midfoot  suggest possible injury to the peroneus longus.    There is joint space narrowing of the subtalar joint with possible  fusion along the dorsal margin.    Calcific densities near the lateral malleolus suggest previous  avulsive injury of the anterior talofibular ligament. It is unclear if  these are ununited fracture fragments or represent osteophytes.           Impression    Impression:  No evidence of acute or subacute bony abnormality.     Generalized osteopenia.    Findings suggesting prior injury to the lateral ligament complex, most  likely the anterior talofibular ligament.    Moderate to severe joint space narrowing of the subtalar joint with  possible fusion along the dorsal margin.    RACHEL ADKISN MD

## 2019-08-13 NOTE — PROGRESS NOTES
Discharge Note      Data:  Amanda Gomez discharged to home at 1320 via ambulation. Accompanied by spouse and staff.    Action:  Written discharge/follow-up instructions were provided to patient and spouse. Prescriptions sent to patients preferred pharmacy. All belongings sent with patient.    Response:  Patient and  verbalized understanding of discharge instructions, reason for discharge, and necessary follow-up appointments.    Sharri Hall RN on 8/13/2019 at 1:25 PM

## 2019-08-13 NOTE — PHARMACY - DISCHARGE MEDICATION RECONCILIATION AND EDUCATION
[facility]  Part of [address] [address]    August 13, 2019    Dear Pharmacist,    Your customer, Amanda Gomez, born on 1955, was recently discharged from [facility].  We have updated her medication list and want to alert you to the following:       Review of your medicines      START taking      Dose / Directions   amLODIPine 5 MG tablet  Commonly known as:  NORVASC  Used for:  Essential hypertension      Dose:  5 mg  Start taking on:  8/14/2019  Take 1 tablet (5 mg) by mouth daily  Quantity:  30 tablet  Refills:  1     doxycycline hyclate 100 MG capsule  Commonly known as:  VIBRAMYCIN  Used for:  Fever, unspecified fever cause      Dose:  100 mg  Take 1 capsule (100 mg) by mouth 2 times daily for 10 days  Quantity:  20 capsule  Refills:  0        CONTINUE these medicines which have NOT CHANGED      Dose / Directions   acetaminophen 500 MG tablet  Commonly known as:  TYLENOL      Dose:  500 mg  Take 500 mg by mouth every 6 hours as needed for mild pain  Refills:  0     acyclovir 5 % external ointment  Commonly known as:  ZOVIRAX      Apply topically every 3 hours as needed  Refills:  0     aspirin 81 MG EC tablet      Dose:  81 mg  Take 81 mg by mouth daily  Refills:  0     buPROPion 300 MG 24 hr tablet  Commonly known as:  WELLBUTRIN XL      Dose:  300 mg  Take 300 mg by mouth daily  Refills:  0     calcipotriene 0.005 % external ointment  Commonly known as:  DOVONOX      Apply topically 2 times daily as needed  Refills:  0     calcium carbonate 500 MG tablet  Commonly known as:  OS-MORENA      Dose:  1 tablet  Take 1 tablet by mouth daily  Refills:  0     ENBREL SURECLICK 50 MG/ML autoinjector  Generic drug:  etanercept      Dose:  50 mg  Inject 50 mg Subcutaneous once a week  Refills:  0     HYDROcodone-acetaminophen 5-325 MG tablet  Commonly known as:  NORCO      take one tablet every 8 hours as needed for pain  Refills:   0     hydrOXYzine 10 MG tablet  Commonly known as:  ATARAX      Dose:  20 mg  Take 20 mg by mouth daily as needed for itching  Refills:  0     ibuprofen 800 MG tablet  Commonly known as:  ADVIL/MOTRIN      Dose:  800 mg  Take 800 mg by mouth 3 times daily  Refills:  0     lidocaine 5 % patch  Commonly known as:  LIDODERM      Dose:  1 patch  Place 1 patch onto the skin daily as needed for moderate pain To prevent lidocaine toxicity, patient should be patch free for 12 hrs daily.  Refills:  0     magnesium 250 MG tablet      Dose:  2 tablet  Take 2 tablets by mouth daily  Refills:  0     melatonin 5 MG Caps      Dose:  1 capsule  Take 1 capsule by mouth At Bedtime  Refills:  0     milk thistle xtra Caps capsule      Dose:  1 capsule  Take 1 capsule by mouth daily  Refills:  0     mometasone 0.1 % external cream  Commonly known as:  ELOCON      Apply topically daily as needed  Refills:  0     mupirocin 2 % external ointment  Commonly known as:  BACTROBAN      Apply topically 3 times daily as needed  Refills:  0     ranitidine 300 MG tablet  Commonly known as:  ZANTAC      Dose:  300 mg  Take 300 mg by mouth every evening as needed for heartburn  Refills:  0     valACYclovir 500 MG tablet  Commonly known as:  VALTREX      Dose:  500 mg  Take 500 mg by mouth 2 times daily as needed  Refills:  0     vitamin D3 2000 units (50 mcg) tablet  Commonly known as:  CHOLECALCIFEROL      Dose:  4 tablet  Take 4 tablets by mouth daily  Refills:  0        STOP taking    hydrochlorothiazide 25 MG tablet  Commonly known as:  HYDRODIURIL              Where to get your medicines      These medications were sent to [id number] - [place], [place] - [address], [facility] [address]    Phone:  [phone]     amLODIPine 5 MG tablet    doxycycline hyclate 100 MG capsule         We also reviewed Amanda Gomez's allergy list and updated it as needed:  Allergies:  Cephalosporins; Clindamycin; Lisinopril; Penicillins; and Varenicline    Thank you for continuing to care for Amanda ANNA Gomez.  We look forward to working together with you in the future.    Sincerely,  May Monteiro [facility]

## 2019-08-13 NOTE — PLAN OF CARE
Problem: Infection  Goal: Infection Symptom Resolution  Note:   Temp currently 99.5, pt diaphoretic. No other changes noted. Will continue to monitor.   Mary Nunn RN on 8/13/2019 at 5:44 AM

## 2019-08-13 NOTE — PLAN OF CARE
Problem: Infection  Goal: Infection Symptom Resolution  8/13/2019 0336 by Mary Nunn, RN  Note:   Pt resting well throughout shift, temp of 101.5 at 0300, PRN Tylenol given. Other VSS. Pt denies pain and nausea. Call light within reach.   Mary Nunn RN on 8/13/2019 at 3:35 AM

## 2019-08-13 NOTE — PHARMACY - DISCHARGE MEDICATION RECONCILIATION AND EDUCATION
Pharmacy: Discharge Counseling and Medication Reconciliation    Amanda Gomez  [address]  [address]   [place] [address]  [phone] (home)   64 year old female  PCP:No Ref-Primary, Physician    Allergies   Allergen Reactions     Cephalosporins Hives     Clindamycin Hives     Lisinopril Cough     Penicillins Hives     Varenicline        Discharge Counseling:    Pharmacist met with patient (and/or family) today to review the medication portion of the After Visit Summary (with an emphasis on NEW medications) and to address patient's questions/concerns.     Summary of Education:   Met with patient at time of discharge to review all changes in medications including new amlodipine and doxycycline and discontinued hydrochlorothiazide. Discussed indications, directions for use, and possible side effects. Patient asked a few questions and all concerns were addressed.     Materials Provided:   MedCounselor sheets printed from Clinical Pharmacology on: doxycycline and hydrochlorothiazide     Discharge Medication Reconciliation:    May Monteiro has reviewed the patient's discharge medication orders and has compared them to the inpatient medication administration record and to what the patient was taking prior to admission- any discrepancies have been resolved.     It has been determined that the patient has an adequate supply of medications available or which can be obtained from the patient's preferred pharmacy, which has been confirmed as: [facility]. [An updated medication list will be faxed to the patient's pharmacy.]     Thank you for the consult.     May Monteiro ....................  8/13/2019   2:30 PM

## 2019-08-13 NOTE — PLAN OF CARE
Problem: Infection  Goal: Infection Symptom Resolution  8/13/2019 0003 by Mary Nunn, RN  Note:   Pt given PRN Tylenol at 2100 for temp of 100.2, temp remains at 100.3. Pt took shower. IV Abx given, pt voiding well, had 1 small loose stool. PRN melatonin given. LS clear, HR regular, BS active. Other VSS. Will continue to monitor.   Mary Nunn, RN on 8/13/2019 at 12:04 AM

## 2019-08-13 NOTE — PLAN OF CARE
"Afebrile this AM, reports discomfort and body aches at 3/10, Ibuprofen given and this has been effective. LS with faint fine crackles that cleared with cough, denies any shortness of breath. Up ambulating to and from bathroom with steady gait, no alarms present at this time. Reports watery stools, denies any abdominal cramping or discomfort, no nausea.     /57   Pulse 79   Temp 98.7  F (37.1  C) (Tympanic)   Resp 16   Ht 1.676 m (5' 6\")   Wt 68.8 kg (151 lb 9.6 oz)   SpO2 96%   BMI 24.47 kg/m      Sharri Hall RN on 8/13/2019 at 8:45 AM    "

## 2019-08-13 NOTE — PLAN OF CARE
Reports increased anxiety, tearful and frustrated, states she takes Hydroxyzine 25 mg at home and she would like to take that here if possible. Updated Dr. Guidry who will place an order for patient to have this medication.      Sharri Hall RN on 8/13/2019 at 10:23 AM

## 2019-08-14 LAB
BACTERIA SPEC CULT: NO GROWTH
SPECIMEN SOURCE: NORMAL

## 2019-08-15 ENCOUNTER — OFFICE VISIT (OUTPATIENT)
Dept: FAMILY MEDICINE | Facility: OTHER | Age: 64
End: 2019-08-15
Attending: NURSE PRACTITIONER
Payer: COMMERCIAL

## 2019-08-15 VITALS
RESPIRATION RATE: 18 BRPM | BODY MASS INDEX: 24.76 KG/M2 | DIASTOLIC BLOOD PRESSURE: 56 MMHG | SYSTOLIC BLOOD PRESSURE: 108 MMHG | WEIGHT: 153.4 LBS | OXYGEN SATURATION: 93 % | TEMPERATURE: 98.1 F | HEART RATE: 86 BPM

## 2019-08-15 DIAGNOSIS — R53.83 FATIGUE, UNSPECIFIED TYPE: Primary | ICD-10-CM

## 2019-08-15 DIAGNOSIS — A93.8 TICK BORNE FEVER: ICD-10-CM

## 2019-08-15 DIAGNOSIS — B34.9 VIRAL ILLNESS: ICD-10-CM

## 2019-08-15 DIAGNOSIS — R74.8 ELEVATED LIVER ENZYMES: ICD-10-CM

## 2019-08-15 LAB
ALBUMIN SERPL-MCNC: 3.6 G/DL (ref 3.5–5.7)
ALP SERPL-CCNC: 85 U/L (ref 34–104)
ALT SERPL W P-5'-P-CCNC: 125 U/L (ref 7–52)
ANION GAP SERPL CALCULATED.3IONS-SCNC: 7 MMOL/L (ref 3–14)
AST SERPL W P-5'-P-CCNC: 170 U/L (ref 13–39)
BASOPHILS # BLD AUTO: 0 10E9/L (ref 0–0.2)
BASOPHILS NFR BLD AUTO: 0 %
BILIRUB SERPL-MCNC: 0.4 MG/DL (ref 0.3–1)
BUN SERPL-MCNC: 12 MG/DL (ref 7–25)
CALCIUM SERPL-MCNC: 9.4 MG/DL (ref 8.6–10.3)
CHLORIDE SERPL-SCNC: 100 MMOL/L (ref 98–107)
CO2 SERPL-SCNC: 26 MMOL/L (ref 21–31)
CREAT SERPL-MCNC: 0.82 MG/DL (ref 0.6–1.2)
DIFFERENTIAL METHOD BLD: NORMAL
EOSINOPHIL # BLD AUTO: 0 10E9/L (ref 0–0.7)
EOSINOPHIL NFR BLD AUTO: 0 %
ERYTHROCYTE [DISTWIDTH] IN BLOOD BY AUTOMATED COUNT: 12.2 % (ref 10–15)
GFR SERPL CREATININE-BSD FRML MDRD: 71 ML/MIN/{1.73_M2}
GLUCOSE SERPL-MCNC: 122 MG/DL (ref 70–105)
HCT VFR BLD AUTO: 36.5 % (ref 35–47)
HGB BLD-MCNC: 12.4 G/DL (ref 11.7–15.7)
LYMPHOCYTES # BLD AUTO: 5 10E9/L (ref 0.8–5.3)
LYMPHOCYTES NFR BLD AUTO: 54 %
MCH RBC QN AUTO: 31.6 PG (ref 26.5–33)
MCHC RBC AUTO-ENTMCNC: 34 G/DL (ref 31.5–36.5)
MCV RBC AUTO: 93 FL (ref 78–100)
MONOCYTES # BLD AUTO: 0.9 10E9/L (ref 0–1.3)
MONOCYTES NFR BLD AUTO: 10 %
NEUTROPHILS # BLD AUTO: 3.3 10E9/L (ref 1.6–8.3)
NEUTROPHILS NFR BLD AUTO: 36 %
PLATELET # BLD AUTO: 175 10E9/L (ref 150–450)
PLATELET # BLD EST: ADEQUATE 10*3/UL
POTASSIUM SERPL-SCNC: 3.7 MMOL/L (ref 3.5–5.1)
PROT SERPL-MCNC: 7 G/DL (ref 6.4–8.9)
RBC # BLD AUTO: 3.92 10E12/L (ref 3.8–5.2)
RBC MORPH BLD: NORMAL
SODIUM SERPL-SCNC: 133 MMOL/L (ref 134–144)
TSH SERPL DL<=0.05 MIU/L-ACNC: 4.62 IU/ML (ref 0.34–5.6)
WBC # BLD AUTO: 9.3 10E9/L (ref 4–11)

## 2019-08-15 PROCEDURE — 87040 BLOOD CULTURE FOR BACTERIA: CPT | Mod: ZL | Performed by: NURSE PRACTITIONER

## 2019-08-15 PROCEDURE — 86645 CMV ANTIBODY IGM: CPT | Mod: ZL | Performed by: NURSE PRACTITIONER

## 2019-08-15 PROCEDURE — G0463 HOSPITAL OUTPT CLINIC VISIT: HCPCS | Performed by: NURSE PRACTITIONER

## 2019-08-15 PROCEDURE — 36415 COLL VENOUS BLD VENIPUNCTURE: CPT | Mod: ZL | Performed by: NURSE PRACTITIONER

## 2019-08-15 PROCEDURE — 86665 EPSTEIN-BARR CAPSID VCA: CPT | Mod: ZL | Performed by: NURSE PRACTITIONER

## 2019-08-15 PROCEDURE — 99496 TRANSJ CARE MGMT HIGH F2F 7D: CPT | Performed by: NURSE PRACTITIONER

## 2019-08-15 PROCEDURE — 84443 ASSAY THYROID STIM HORMONE: CPT | Mod: ZL | Performed by: NURSE PRACTITIONER

## 2019-08-15 PROCEDURE — 99214 OFFICE O/P EST MOD 30 MIN: CPT | Performed by: NURSE PRACTITIONER

## 2019-08-15 PROCEDURE — 86644 CMV ANTIBODY: CPT | Mod: ZL | Performed by: NURSE PRACTITIONER

## 2019-08-15 PROCEDURE — 80053 COMPREHEN METABOLIC PANEL: CPT | Mod: ZL | Performed by: NURSE PRACTITIONER

## 2019-08-15 PROCEDURE — 85025 COMPLETE CBC W/AUTO DIFF WBC: CPT | Mod: ZL | Performed by: NURSE PRACTITIONER

## 2019-08-15 PROCEDURE — G0463 HOSPITAL OUTPT CLINIC VISIT: HCPCS

## 2019-08-15 ASSESSMENT — ENCOUNTER SYMPTOMS
DIARRHEA: 0
MYALGIAS: 1
FEVER: 1
DYSURIA: 0
HEARTBURN: 0
VOMITING: 0
ABDOMINAL PAIN: 0
COUGH: 0
SORE THROAT: 0
CHILLS: 1
SHORTNESS OF BREATH: 0
NAUSEA: 0

## 2019-08-15 ASSESSMENT — PAIN SCALES - GENERAL: PAINLEVEL: MODERATE PAIN (4)

## 2019-08-15 NOTE — PROGRESS NOTES
"  SUBJECTIVE:   Amanda Gomez is a 64 year old female who presents to clinic today for the following health issues:    HPI  Patient presents with spouse for evaluation for hospital follow-up for fevers, fatigue and body aches after she demanded to be discharged on 8/13 despite the disapproval from the hospitalist and her spouse. Fevers have been ongoing for 6 days. She has no focal findings of pulmonary, gastrointestinal, dermatologic or urinary infection. She had no known tick bites, but due to symptoms of fever, muscle aches, and possible exposure she was treated with doxycyline. Lyme was negative and Anaplasma titers are still pending. Her blood cultures are negative. She continues to have fevers after her hospitalization and reports extreme fatigue. \"I'm convinced I have leukemia.\" She has been taking doxycyline as prescribed and symptoms have remained the same. She has no new focal symptoms. She does have psoriatic arthritis and is on weekly injections of etanercept. Her spouse remains healthy. She has no report of HIV contact.     Patient Active Problem List    Diagnosis Date Noted     Hypokalemia 08/11/2019     Priority: Medium     Fever 08/11/2019     Priority: Medium     History of dysplastic nevus 12/30/2015     Priority: Medium     Overview:   Mid-upper back 2014, mild       Recurrent cold sores 05/27/2015     Priority: Medium     High risk medication use 12/11/2014     Priority: Medium     Overview:   MTX, Humira       Dysplastic nevus 10/06/2014     Priority: Medium     Pain in joint of left shoulder 09/23/2014     Priority: Medium     Overview:   IMO Update       Wrist pain, left 09/23/2014     Priority: Medium     Chronic pain syndrome 06/12/2014     Priority: Medium     Overview:   [facility] Agreement for Opioid Treatment.        Distal radius fracture, right 03/08/2014     Priority: Medium     Osteopenia 03/08/2014     Priority: Medium     Essential hypertension 08/02/2011     Priority: " Medium     Positive GABE (antinuclear antibody) 03/27/2008     Priority: Medium     Overview:   2008: Borderline positive dsDNA, SSB / La, and RNP. ? Significance, on Humira.       Psoriatic arthritis (H) 01/08/2008     Priority: Medium     Overview:   Onset ~ 2007; Raptiva (no benefit); MTX trial (2009), Humira ([id number]), Enbrel 4/2016       Psoriasis with pustules 10/28/2007     Priority: Medium     Pain in joint, pelvic region and thigh 12/20/2006     Priority: Medium     Overview:   IMO Update 10/11       Moderate recurrent major depression (H) 10/17/2003     Priority: Medium     Overview:   with mixed anxiety       Tobacco use disorder 07/09/2003     Priority: Medium     History reviewed. No pertinent past medical history.   History reviewed. No pertinent surgical history.    Review of Systems   Constitutional: Positive for chills and fever.   HENT: Negative.  Negative for sore throat.    Respiratory: Negative for cough and shortness of breath.    Gastrointestinal: Negative for abdominal pain, diarrhea, heartburn, nausea and vomiting.   Genitourinary: Negative for dysuria and vaginal discharge.   Musculoskeletal: Positive for myalgias.   Skin: Negative for rash.      OBJECTIVE:     /56   Pulse 86   Temp 98.1  F (36.7  C) (Temporal)   Resp 18   Wt 69.6 kg (153 lb 6.4 oz)   SpO2 93%   Breastfeeding? No   BMI 24.76 kg/m    Body mass index is 24.76 kg/m .  Physical Exam   Constitutional: She appears well-developed and well-nourished. No distress.   HENT:   Mouth/Throat: Oropharynx is clear and moist.   Neck: No thyromegaly present.   Cardiovascular: Regular rhythm and normal heart sounds.   Pulmonary/Chest: Effort normal and breath sounds normal.   Abdominal: Soft. Bowel sounds are normal. There is no tenderness.   Lymphadenopathy:     She has no cervical adenopathy.   Skin: Skin is warm. There is pallor.     Diagnostic Test Results:  Results for orders placed or performed in visit on 08/15/19 (from  the past 24 hour(s))   TSH   Result Value Ref Range    Thyrotropin 4.62 0.34 - 5.60 IU/mL   Comprehensive Metabolic Panel   Result Value Ref Range    Sodium 133 (L) 134 - 144 mmol/L    Potassium 3.7 3.5 - 5.1 mmol/L    Chloride 100 98 - 107 mmol/L    Carbon Dioxide 26 21 - 31 mmol/L    Anion Gap 7 3 - 14 mmol/L    Glucose 122 (H) 70 - 105 mg/dL    Urea Nitrogen 12 7 - 25 mg/dL    Creatinine 0.82 0.60 - 1.20 mg/dL    GFR Estimate 71 >60 mL/min/[1.73_m2]    GFR Estimate If Black 85 >60 mL/min/[1.73_m2]    Calcium 9.4 8.6 - 10.3 mg/dL    Bilirubin Total 0.4 0.3 - 1.0 mg/dL    Albumin 3.6 3.5 - 5.7 g/dL    Protein Total 7.0 6.4 - 8.9 g/dL    Alkaline Phosphatase 85 34 - 104 U/L     (H) 7 - 52 U/L     (H) 13 - 39 U/L   CBC and Differential   Result Value Ref Range    WBC 9.3 4.0 - 11.0 10e9/L    RBC Count 3.92 3.8 - 5.2 10e12/L    Hemoglobin 12.4 11.7 - 15.7 g/dL    Hematocrit 36.5 35.0 - 47.0 %    MCV 93 78 - 100 fl    MCH 31.6 26.5 - 33.0 pg    MCHC 34.0 31.5 - 36.5 g/dL    RDW 12.2 10.0 - 15.0 %    Platelet Count 175 150 - 450 10e9/L    Diff Method Manual Differential     % Neutrophils 36.0 %    % Lymphocytes 54.0 %    % Monocytes 10.0 %    % Eosinophils 0.0 %    % Basophils 0.0 %    Absolute Neutrophil 3.3 1.6 - 8.3 10e9/L    Absolute Lymphocytes 5.0 0.8 - 5.3 10e9/L    Absolute Monocytes 0.9 0.0 - 1.3 10e9/L    Absolute Eosinophils 0.0 0.0 - 0.7 10e9/L    Absolute Basophils 0.0 0.0 - 0.2 10e9/L    Platelet Estimate ADEQUATE     RBC Morphology NORM      Blood cultures pending   ASSESSMENT/PLAN:   1. Fatigue, unspecified type  Discharge summary reviewed. Further lab testing to be completed today. Her TSH was normal. CBC is normal. CMP showed corrected electrolytes but increase in liver enzymes.   - CMV Antibody IgG; Future  - EBV Capsid Antibody IgG; Future  - CBC and Differential; Future  - Comprehensive Metabolic Panel; Future  - TSH; Future  - Blood Culture; Future  - Blood Culture  - TSH  -  Comprehensive Metabolic Panel  - CBC and Differential  - EBV Capsid Antibody IgG  - CMV Antibody IgG    2. Viral illness  Suspect a viral cause for her symptoms. EBV/CMV pending. Hepatitis? She was encouraged that if symptoms worsen she should be seen in the ER. If no improvement next week she should complete a clinic follow-up for re-evaluation of lab work.     3. Tick borne fever  Continue with doxycyline until results of anaplasma come back. Treat fevers with tylenol.     4. Elevated liver enzymes  Follow-up next week with lab work. We will complete an ultrasound of abdomen to further evaluate liver and gallbladder.     Dianne Peña [facility] AND [facility]

## 2019-08-15 NOTE — NURSING NOTE
"Chief Complaint   Patient presents with     Hospital F/U       Initial /56   Pulse 86   Temp 98.1  F (36.7  C) (Temporal)   Resp 18   Wt 69.6 kg (153 lb 6.4 oz)   SpO2 93%   Breastfeeding? No   BMI 24.76 kg/m   Estimated body mass index is 24.76 kg/m  as calculated from the following:    Height as of 8/11/19: 1.676 m (5' 6\").    Weight as of this encounter: 69.6 kg (153 lb 6.4 oz).  Medication Reconciliation: complete    Sharri Helton LPN       "

## 2019-08-16 LAB
A PHAGOCYTOPH DNA BLD QL NAA+PROBE: NOT DETECTED
E CHAFFEENSIS DNA BLD QL NAA+PROBE: NOT DETECTED
E EWINGII DNA SPEC QL NAA+PROBE: NOT DETECTED
EHRLICHIA DNA SPEC QL NAA+PROBE: NOT DETECTED

## 2019-08-17 LAB
BACTERIA SPEC CULT: NORMAL
BACTERIA SPEC CULT: NORMAL
EBV VCA IGG SER QL IA: 6.8 AI (ref 0–0.8)
SPECIMEN SOURCE: NORMAL
SPECIMEN SOURCE: NORMAL

## 2019-08-18 LAB — CMV IGG SERPL QL IA: 2.8 AI (ref 0–0.8)

## 2019-08-19 ENCOUNTER — TELEPHONE (OUTPATIENT)
Dept: FAMILY MEDICINE | Facility: OTHER | Age: 64
End: 2019-08-19

## 2019-08-19 LAB
CMV IGM SERPL QL IA: >4 AI (ref 0–0.8)
EBV VCA IGM SER QL IA: 0.5 AI (ref 0–0.8)

## 2019-08-19 NOTE — TELEPHONE ENCOUNTER
"Called patient about lab results per Dr. Puga. Patient requested to follow up with SERGIO Peña NP as her old PCP has retired. Attempted to transfer her to scheduled to make an appointment but she ended the call just prior to transfer.    \"----- Message from Anyi Puga MD sent at 8/19/2019 12:01 PM CDT -----  Please call the patient and let her know that the follow labs done by Dianne Peña at her recent follow up appointment indicated that her recent illness may have been due to cytomegalovirus. This is a common virus that can cause symptoms similar to what she experienced. But it is still possible that her symptoms were related to anaplasmosis (due to a tick). All symptoms should resolve, but I would recommend that she schedule a follow up appointment to recheck labs in 1-2 weeks. This can either be done here OR in [place] where she was previously seen. Anyi Puga MD\"    Sneha Phillips RN  ....................  8/19/2019   12:48 PM    "

## 2019-08-19 NOTE — TELEPHONE ENCOUNTER
----- Message from Anyi Puga MD sent at 8/19/2019 12:01 PM CDT -----  Please call the patient and let her know that the follow labs done by Dianne Peña at her recent follow up appointment indicated that her recent illness may have been due to cytomegalovirus. This is a common virus that can cause symptoms similar to what she experienced. But it is still possible that her symptoms were related to anaplasmosis (due to a tick). All symptoms should resolve, but I would recommend that she schedule a follow up appointment to recheck labs in 1-2 weeks. This can either be done here OR in [place] where she was previously seen. Anyi Puga MD

## 2019-08-21 LAB
BACTERIA SPEC CULT: NORMAL
SPECIMEN SOURCE: NORMAL

## 2019-08-29 ENCOUNTER — OFFICE VISIT (OUTPATIENT)
Dept: FAMILY MEDICINE | Facility: OTHER | Age: 64
End: 2019-08-29
Attending: FAMILY MEDICINE
Payer: COMMERCIAL

## 2019-08-29 VITALS
SYSTOLIC BLOOD PRESSURE: 144 MMHG | HEIGHT: 66 IN | HEART RATE: 100 BPM | WEIGHT: 150 LBS | DIASTOLIC BLOOD PRESSURE: 80 MMHG | RESPIRATION RATE: 18 BRPM | TEMPERATURE: 98.4 F | BODY MASS INDEX: 24.11 KG/M2

## 2019-08-29 DIAGNOSIS — B34.9 VIRAL ILLNESS: ICD-10-CM

## 2019-08-29 DIAGNOSIS — R53.83 FATIGUE, UNSPECIFIED TYPE: Primary | ICD-10-CM

## 2019-08-29 DIAGNOSIS — A93.8 TICK BORNE FEVER: ICD-10-CM

## 2019-08-29 PROCEDURE — G0463 HOSPITAL OUTPT CLINIC VISIT: HCPCS

## 2019-08-29 PROCEDURE — 36415 COLL VENOUS BLD VENIPUNCTURE: CPT | Mod: ZL | Performed by: FAMILY MEDICINE

## 2019-08-29 PROCEDURE — 86753 PROTOZOA ANTIBODY NOS: CPT | Mod: ZL | Performed by: FAMILY MEDICINE

## 2019-08-29 PROCEDURE — 99215 OFFICE O/P EST HI 40 MIN: CPT | Performed by: FAMILY MEDICINE

## 2019-08-29 ASSESSMENT — MIFFLIN-ST. JEOR: SCORE: 1257.15

## 2019-08-29 ASSESSMENT — PAIN SCALES - GENERAL: PAINLEVEL: MODERATE PAIN (4)

## 2019-08-29 NOTE — NURSING NOTE
"Chief Complaint   Patient presents with     RECHECK     follow up on labs and illness       Initial BP (!) 144/80 (BP Location: Right arm, Patient Position: Sitting, Cuff Size: Adult Regular)   Pulse 100   Temp 98.4  F (36.9  C) (Tympanic)   Resp 18   Ht 1.676 m (5' 6\")   Wt 68 kg (150 lb)   BMI 24.21 kg/m   Estimated body mass index is 24.21 kg/m  as calculated from the following:    Height as of this encounter: 1.676 m (5' 6\").    Weight as of this encounter: 68 kg (150 lb).  Medication Reconciliation: complete    Nasrin Ramirez LPN    "

## 2019-08-29 NOTE — LETTER
September 4, 2019      Amanda SAUCEDA     [facility] [address]        Dear ,    We are writing to inform you of your test results.        Resulted Orders   Babesia antibody IgG IgM   Result Value Ref Range    Babesia IgG 1:256 (A) <1:16      Comment:      (Note)  INTERPRETIVE INFORMATION: Babesia microti Antibody, IgG   Less than 1:16 ........ Negative - No significant level                           of detectable Babesia IgG                           antibodies.   1:16 .................. Equivocal - Repeat testing in                           10-14 days may be helpful.   Greater than 1:16 ..... Positive - IgG antibodies to                           Babesia detected which may                           indicate a current or previous                           infection.  Test developed and characteristics determined by [facility]. See Compliance Statement A: [employer]      Babesia IgM > 1:320 <1:20      Comment:      (Note)  INTERPRETIVE INFORMATION: Babesia microti Antibody, IgM   Less than 1:20 ........ Negative - No significant level                           of detectable Babesia IgM                           antibodies.   1:20 .................. Equivocal - Repeat testing in                           10-14 days may be helpful.   Greater than 1:20 ..... Positive - IgM antibodies to                           Babesia detected which may                           indicate a current or recent                           infection.  Test developed and characteristics determined by [facility]. See Compliance Statement A: [employer]  Performed by [facility],  [address] [place] [id number] [phone]  [email or web address], Roni Park MD, Lab. Director         If you have any questions or concerns, please call the clinic at the number listed above.       Sincerely,        Ryder Stack MD

## 2019-08-30 NOTE — PROGRESS NOTES
SUBJECTIVE:   Amanda Gomez is a 64 year old female who presents to clinic today for the following health issues: Follow-up labs patient arrives here for follow-up labs and illness.     She reports she has not felt much better.  She was recently seen in multiple labs were done including EBV CMV anaplasmosis and Lyme disease.  It was felt that she had Lyme disease and she was started on doxycycline empirically but the lab has since returned back negative.  Her CMV titer did come back positive with an elevated IgM and a elevated IgG.  She reports she continues to have fevers on and off.  She is on Enbrel for psoriatic arthritis but has held her Enbrel on a number of times.  She states she recently did take it about a week ago because the arthritis was getting out of hand.  She is also complaining of left abdominal pain.  That usually is worse after eating.  Appetite is going away a little bit.  Review of the chart she has not had babesiosis titer.  She reports that she does not get outside very much.  And would be very unusual to have a vector borne disease        Patient Active Problem List    Diagnosis Date Noted     Hypokalemia 08/11/2019     Priority: Medium     Fever 08/11/2019     Priority: Medium     History of dysplastic nevus 12/30/2015     Priority: Medium     Overview:   Mid-upper back 2014, mild       Recurrent cold sores 05/27/2015     Priority: Medium     High risk medication use 12/11/2014     Priority: Medium     Overview:   MTX, Humira       Dysplastic nevus 10/06/2014     Priority: Medium     Pain in joint of left shoulder 09/23/2014     Priority: Medium     Overview:   IMO Update       Wrist pain, left 09/23/2014     Priority: Medium     Chronic pain syndrome 06/12/2014     Priority: Medium     Overview:   [facility] Agreement for Opioid Treatment.        Distal radius fracture, right 03/08/2014     Priority: Medium     Osteopenia 03/08/2014     Priority: Medium     Essential hypertension  "08/02/2011     Priority: Medium     Positive GABE (antinuclear antibody) 03/27/2008     Priority: Medium     Overview:   2008: Borderline positive dsDNA, SSB / La, and RNP. ? Significance, on Humira.       Psoriatic arthritis (H) 01/08/2008     Priority: Medium     Overview:   Onset ~ 2007; Raptiva (no benefit); MTX trial (2009), Humira ([id number]), Enbrel 4/2016       Psoriasis with pustules 10/28/2007     Priority: Medium     Pain in joint, pelvic region and thigh 12/20/2006     Priority: Medium     Overview:   IMO Update 10/11       Moderate recurrent major depression (H) 10/17/2003     Priority: Medium     Overview:   with mixed anxiety       Tobacco use disorder 07/09/2003     Priority: Medium     No past medical history on file.   No past surgical history on file.  Allergies   Allergen Reactions     Cephalosporins Hives     Clindamycin Hives     Lisinopril Cough     Penicillins Hives     Varenicline        Review of Systems     OBJECTIVE:     BP (!) 144/80 (BP Location: Right arm, Patient Position: Sitting, Cuff Size: Adult Regular)   Pulse 100   Temp 98.4  F (36.9  C) (Tympanic)   Resp 18   Ht 1.676 m (5' 6\")   Wt 68 kg (150 lb)   BMI 24.21 kg/m     Body mass index is 24.21 kg/m .  Physical Exam   Constitutional:   Mildly sick in appearance but not toxic   HENT:   Head: Normocephalic.   Right Ear: External ear normal.   Left Ear: External ear normal.   Cardiovascular: Normal rate.   Pulmonary/Chest: Effort normal.   Abdominal: Soft. She exhibits no distension and no mass. There is tenderness. There is no guarding.   She does have some tenderness in the epigastric area.  No hepato-or splenomegaly   Musculoskeletal: Normal range of motion.   Neurological: She is alert.   Skin: Skin is warm.       Diagnostic Test Results:  none     ASSESSMENT/PLAN:         1. Viral illness  /CMV virus.  We discussed the course of CMV virus.  And I did recommend that she stop her Enbrel.  Patient does plan on calling her " rheumatologist.  I do believe the patient should have babesiosis also to rule out.    2. Fatigue, unspecified type  Likely CMV virus.  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  - Babesia antibody IgG IgM; Future  - Babesia antibody IgG IgM    3. Tick borne fever  Rule out  - Babesia antibody IgG IgM; Future  - Babesia antibody IgG IgM    Received a PA form to fill out concerning request for Medicare prescription drug coverage determination.  Form is filled out.  Greater than 1 hour spent with the patient total time including the prescription drug coverage determination.  Ryder Satck MD  [facility]

## 2019-09-03 LAB
B MICROTI IGG TITR SER: ABNORMAL {TITER}
B MICROTI IGM TITR SER: ABNORMAL {TITER}

## 2019-09-04 DIAGNOSIS — B60.00 BABESIOSIS: Primary | ICD-10-CM

## 2019-09-04 RX ORDER — AZITHROMYCIN 250 MG/1
TABLET, FILM COATED ORAL
Qty: 8 TABLET | Refills: 0 | Status: SHIPPED | OUTPATIENT
Start: 2019-09-04 | End: 2019-09-30

## 2019-09-04 RX ORDER — ATOVAQUONE 750 MG/5ML
750 SUSPENSION ORAL EVERY 12 HOURS
Qty: 70 ML | Refills: 0 | Status: SHIPPED | OUTPATIENT
Start: 2019-09-04 | End: 2019-09-30

## 2019-09-30 ENCOUNTER — OFFICE VISIT (OUTPATIENT)
Dept: FAMILY MEDICINE | Facility: OTHER | Age: 64
End: 2019-09-30
Attending: FAMILY MEDICINE
Payer: COMMERCIAL

## 2019-09-30 VITALS
DIASTOLIC BLOOD PRESSURE: 80 MMHG | RESPIRATION RATE: 16 BRPM | HEART RATE: 76 BPM | SYSTOLIC BLOOD PRESSURE: 138 MMHG | TEMPERATURE: 98 F | WEIGHT: 150 LBS | BODY MASS INDEX: 24.21 KG/M2

## 2019-09-30 DIAGNOSIS — I10 ESSENTIAL HYPERTENSION: ICD-10-CM

## 2019-09-30 LAB
ALBUMIN SERPL-MCNC: 4.3 G/DL (ref 3.5–5.7)
ALP SERPL-CCNC: 66 U/L (ref 34–104)
ALT SERPL W P-5'-P-CCNC: 35 U/L (ref 7–52)
ANION GAP SERPL CALCULATED.3IONS-SCNC: 7 MMOL/L (ref 3–14)
AST SERPL W P-5'-P-CCNC: 42 U/L (ref 13–39)
BILIRUB SERPL-MCNC: 0.4 MG/DL (ref 0.3–1)
BUN SERPL-MCNC: 10 MG/DL (ref 7–25)
CALCIUM SERPL-MCNC: 9.6 MG/DL (ref 8.6–10.3)
CHLORIDE SERPL-SCNC: 104 MMOL/L (ref 98–107)
CO2 SERPL-SCNC: 28 MMOL/L (ref 21–31)
CREAT SERPL-MCNC: 0.77 MG/DL (ref 0.6–1.2)
GFR SERPL CREATININE-BSD FRML MDRD: 76 ML/MIN/{1.73_M2}
GLUCOSE SERPL-MCNC: 104 MG/DL (ref 70–105)
POTASSIUM SERPL-SCNC: 4.1 MMOL/L (ref 3.5–5.1)
PROT SERPL-MCNC: 8.4 G/DL (ref 6.4–8.9)
SODIUM SERPL-SCNC: 139 MMOL/L (ref 134–144)

## 2019-09-30 PROCEDURE — G0463 HOSPITAL OUTPT CLINIC VISIT: HCPCS

## 2019-09-30 PROCEDURE — 99213 OFFICE O/P EST LOW 20 MIN: CPT | Performed by: FAMILY MEDICINE

## 2019-09-30 PROCEDURE — 80053 COMPREHEN METABOLIC PANEL: CPT | Mod: ZL | Performed by: FAMILY MEDICINE

## 2019-09-30 PROCEDURE — 36415 COLL VENOUS BLD VENIPUNCTURE: CPT | Mod: ZL | Performed by: FAMILY MEDICINE

## 2019-09-30 RX ORDER — IBUPROFEN 800 MG/1
800 TABLET, FILM COATED ORAL 3 TIMES DAILY
Qty: 90 TABLET | Refills: 3 | Status: SHIPPED | OUTPATIENT
Start: 2019-09-30 | End: 2020-06-08

## 2019-09-30 RX ORDER — AMLODIPINE BESYLATE 5 MG/1
TABLET ORAL
Qty: 90 TABLET | Refills: 3 | Status: SHIPPED | OUTPATIENT
Start: 2019-09-30 | End: 2020-03-04

## 2019-09-30 ASSESSMENT — PATIENT HEALTH QUESTIONNAIRE - PHQ9: SUM OF ALL RESPONSES TO PHQ QUESTIONS 1-9: 7

## 2019-09-30 NOTE — PROGRESS NOTES
SUBJECTIVE:   Amanda Gomez is a 64 year old female who presents to clinic today for the following health issues: Blood work    Patient arrives here for blood work and follow-up to LFTs.  She recently had blood work done showing elevated LFTs.  Possibly due to babesiosis.  She normally runs slightly elevated while on her Enbrel.  She had stopped her Enbrel was treated for babesiosis and is doing much better.  Patient is in need of a repeat potassium and liver enzymes.        Patient Active Problem List    Diagnosis Date Noted     Babesiosis 09/04/2019     Priority: Medium     Hypokalemia 08/11/2019     Priority: Medium     Fever 08/11/2019     Priority: Medium     History of dysplastic nevus 12/30/2015     Priority: Medium     Overview:   Mid-upper back 2014, mild       Recurrent cold sores 05/27/2015     Priority: Medium     High risk medication use 12/11/2014     Priority: Medium     Overview:   MTX, Humira       Dysplastic nevus 10/06/2014     Priority: Medium     Pain in joint of left shoulder 09/23/2014     Priority: Medium     Overview:   IMO Update       Wrist pain, left 09/23/2014     Priority: Medium     Chronic pain syndrome 06/12/2014     Priority: Medium     Overview:   [facility] Agreement for Opioid Treatment.        Distal radius fracture, right 03/08/2014     Priority: Medium     Osteopenia 03/08/2014     Priority: Medium     Essential hypertension 08/02/2011     Priority: Medium     Positive GABE (antinuclear antibody) 03/27/2008     Priority: Medium     Overview:   2008: Borderline positive dsDNA, SSB / La, and RNP. ? Significance, on Humira.       Psoriatic arthritis (H) 01/08/2008     Priority: Medium     Overview:   Onset ~ 2007; Raptiva (no benefit); MTX trial (2009), Humira ([id number]), Enbrel 4/2016       Psoriasis with pustules 10/28/2007     Priority: Medium     Pain in joint, pelvic region and thigh 12/20/2006     Priority: Medium     Overview:   IMO Update 10/11       Moderate  recurrent major depression (H) 10/17/2003     Priority: Medium     Overview:   with mixed anxiety       Tobacco use disorder 07/09/2003     Priority: Medium     No past medical history on file.   No past surgical history on file.  Allergies   Allergen Reactions     Cephalosporins Hives     Clindamycin Hives     Lisinopril Cough     Penicillins Hives     Varenicline        Review of Systems     OBJECTIVE:     /80   Pulse 76   Temp 98  F (36.7  C)   Resp 16   Wt 68 kg (150 lb)   BMI 24.21 kg/m    Body mass index is 24.21 kg/m .  Physical Exam  Constitutional:       Appearance: Normal appearance.   HENT:      Head: Normocephalic.   Pulmonary:      Effort: Pulmonary effort is normal.   Skin:     General: Skin is warm.   Neurological:      General: No focal deficit present.      Mental Status: She is alert.   Psychiatric:         Mood and Affect: Mood normal.         Diagnostic Test Results:  Results for orders placed or performed in visit on 09/30/19   Comprehensive Metabolic Panel   Result Value Ref Range    Sodium 139 134 - 144 mmol/L    Potassium 4.1 3.5 - 5.1 mmol/L    Chloride 104 98 - 107 mmol/L    Carbon Dioxide 28 21 - 31 mmol/L    Anion Gap 7 3 - 14 mmol/L    Glucose 104 70 - 105 mg/dL    Urea Nitrogen 10 7 - 25 mg/dL    Creatinine 0.77 0.60 - 1.20 mg/dL    GFR Estimate 76 >60 mL/min/[1.73_m2]    GFR Estimate If Black >90 >60 mL/min/[1.73_m2]    Calcium 9.6 8.6 - 10.3 mg/dL    Bilirubin Total 0.4 0.3 - 1.0 mg/dL    Albumin 4.3 3.5 - 5.7 g/dL    Protein Total 8.4 6.4 - 8.9 g/dL    Alkaline Phosphatase 66 34 - 104 U/L    ALT 35 7 - 52 U/L    AST 42 (H) 13 - 39 U/L       ASSESSMENT/PLAN:         1. Essential hypertension/elevated LFTs low potassium    Following lab is satisfactory  - ibuprofen (ADVIL/MOTRIN) 800 MG tablet; Take 1 tablet (800 mg) by mouth 3 times daily  Dispense: 90 tablet; Refill: 3  - amLODIPine (NORVASC) 5 MG tablet; TAKE 1 TABLET(5 MG) BY MOUTH DAILY  Dispense: 90 tablet; Refill:  3  - Comprehensive Metabolic Panel; Future  - Comprehensive Metabolic Panel    Continue current medications.  Ryder Stack MD  [facility]

## 2019-09-30 NOTE — LETTER
October 2, 2019      Amanda SAUCEDA     [facility] [address]        Dear ,    We are writing to inform you of your test results.    Your test results fall within the expected range(s) or remain unchanged from previous results.  Please continue with current treatment plan.    Resulted Orders   Comprehensive Metabolic Panel   Result Value Ref Range    Sodium 139 134 - 144 mmol/L    Potassium 4.1 3.5 - 5.1 mmol/L    Chloride 104 98 - 107 mmol/L    Carbon Dioxide 28 21 - 31 mmol/L    Anion Gap 7 3 - 14 mmol/L    Glucose 104 70 - 105 mg/dL    Urea Nitrogen 10 7 - 25 mg/dL    Creatinine 0.77 0.60 - 1.20 mg/dL    GFR Estimate 76 >60 mL/min/[1.73_m2]    GFR Estimate If Black >90 >60 mL/min/[1.73_m2]    Calcium 9.6 8.6 - 10.3 mg/dL    Bilirubin Total 0.4 0.3 - 1.0 mg/dL    Albumin 4.3 3.5 - 5.7 g/dL    Protein Total 8.4 6.4 - 8.9 g/dL    Alkaline Phosphatase 66 34 - 104 U/L    ALT 35 7 - 52 U/L    AST 42 (H) 13 - 39 U/L       If you have any questions or concerns, please call the clinic at the number listed above.       Sincerely,        Ryder Stack MD

## 2019-09-30 NOTE — NURSING NOTE
Patient comes in to clinic today to follow up on her lab work and her blood pressure.    No LMP recorded. Patient has had a hysterectomy.  Medication Reconciliation: complete    Lissette Mueller LPN  9/30/2019 11:00 AM

## 2019-10-03 ENCOUNTER — TRANSFERRED RECORDS (OUTPATIENT)
Dept: HEALTH INFORMATION MANAGEMENT | Facility: CLINIC | Age: 64
End: 2019-10-03

## 2019-10-22 ENCOUNTER — OFFICE VISIT (OUTPATIENT)
Dept: PODIATRY | Facility: OTHER | Age: 64
End: 2019-10-22
Attending: PODIATRIST
Payer: COMMERCIAL

## 2019-10-22 VITALS
HEIGHT: 66 IN | HEART RATE: 77 BPM | SYSTOLIC BLOOD PRESSURE: 168 MMHG | DIASTOLIC BLOOD PRESSURE: 72 MMHG | OXYGEN SATURATION: 98 % | TEMPERATURE: 97.6 F | WEIGHT: 149 LBS | BODY MASS INDEX: 23.95 KG/M2

## 2019-10-22 DIAGNOSIS — M25.571 SINUS TARSI SYNDROME OF RIGHT ANKLE: ICD-10-CM

## 2019-10-22 DIAGNOSIS — M25.571 CHRONIC PAIN OF RIGHT ANKLE: Primary | ICD-10-CM

## 2019-10-22 DIAGNOSIS — L40.50 PSORIATIC ARTHRITIS (H): ICD-10-CM

## 2019-10-22 DIAGNOSIS — M67.88 ACHILLES TENDINOSIS: ICD-10-CM

## 2019-10-22 DIAGNOSIS — G89.29 CHRONIC PAIN OF RIGHT ANKLE: Primary | ICD-10-CM

## 2019-10-22 PROCEDURE — G0463 HOSPITAL OUTPT CLINIC VISIT: HCPCS

## 2019-10-22 PROCEDURE — 99213 OFFICE O/P EST LOW 20 MIN: CPT | Performed by: PODIATRIST

## 2019-10-22 ASSESSMENT — PAIN SCALES - GENERAL: PAINLEVEL: MODERATE PAIN (5)

## 2019-10-22 ASSESSMENT — MIFFLIN-ST. JEOR: SCORE: 1252.61

## 2019-10-22 NOTE — PROGRESS NOTES
Chief complaint: Patient presents with:  Pain: right ankle  Arthritis: right ankle      History of Present Illness: This 64 year old female with psoriatic arthritis is seen at the request of No ref. provider found for evaluation and suggestions of management of RIGHT ankle and dorsal foot pain.     She has been going to PT since early August, 2019. She still goes a couples times a week. She was told she also has a tight Achilles tendon so they are working on this flexibility as well. She can now stand on it better, but it is still very painful. She did not follow-up with the orthotist, Peggy Sanchez, in September, 2019, because she was diagnosed with Babesiosis from a bug bite so she did not come to the appointment. She is rescheduled for 10/31/2019. She says she takes pain medication for the pain on occasion, but she just started to see her new PCP. No further pedal complaints today.       History of pain:    The pain has been present since around September, 2018, but the pain greatly worsened around May, 2019. The pain is on the outside of the RIGHT foot.     Her pain is a +3 out of 10 on a VAS pain scale because she recently took IBUprofen. She also has Lortab as needed for psoriatic arthritis. She also takes Embrel. 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    She quit smoking in 2017 which caused her to gain weight. She had CMOs made through [facility] at [facility] around 2013, but they were no longer helpnig so she purchased expensive shoes at [facility]. She has tried other OTC inserts but nothing is helping. She had physical therapy around 2015. She went to her orthopedic in [place] recently and it was recommended that she get the CMOs from [facility] and try physical therapy. She has a velcro strap ankle brace she has tried wearing but it does not help. She has tried kinesiotape, but this also has not decreased her pain. She has tried therapeutic patches, but the pain  "returns right away. This is causing all the proximal joints to also cause pain.      BP (!) 150/84 (BP Location: Right arm, Patient Position: Sitting, Cuff Size: Adult Regular)   Pulse 77   Temp 97.6  F (36.4  C) (Tympanic)   Ht 1.676 m (5' 6\")   Wt 67.6 kg (149 lb)   SpO2 98%   BMI 24.05 kg/m      Patient Active Problem List   Diagnosis     Chronic pain syndrome     Distal radius fracture, right     Dysplastic nevus     Essential hypertension     High risk medication use     History of dysplastic nevus     Moderate recurrent major depression (H)     Osteopenia     Pain in joint of left shoulder     Pain in joint, pelvic region and thigh     Psoriasis with pustules     Positive GABE (antinuclear antibody)     Psoriatic arthritis (H)     Recurrent cold sores     Tobacco use disorder     Wrist pain, left     Hypokalemia     Fever     Babesiosis       History reviewed. No pertinent surgical history.    Current Outpatient Medications   Medication     acetaminophen (TYLENOL) 500 MG tablet     acyclovir (ZOVIRAX) 5 % external ointment     amLODIPine (NORVASC) 5 MG tablet     aspirin 81 MG EC tablet     buPROPion (WELLBUTRIN XL) 300 MG 24 hr tablet     calcipotriene (DOVONOX) 0.005 % external ointment     calcium carbonate (OS-MORENA) 500 MG tablet     etanercept (ENBREL SURECLICK) 50 MG/ML autoinjector     HYDROcodone-acetaminophen (NORCO) 5-325 MG tablet     hydrOXYzine (ATARAX) 10 MG tablet     ibuprofen (ADVIL/MOTRIN) 800 MG tablet     lidocaine (LIDODERM) 5 % patch     magnesium 250 MG tablet     melatonin 5 MG CAPS     Milk Thistle-Dand-Fennel-Licor (MILK THISTLE XTRA) CAPS capsule     mometasone (ELOCON) 0.1 % external cream     mupirocin (BACTROBAN) 2 % external ointment     ranitidine (ZANTAC) 300 MG tablet     valACYclovir (VALTREX) 500 MG tablet     vitamin D3 (CHOLECALCIFEROL) 2000 units (50 mcg) tablet     No current facility-administered medications for this visit.           Allergies   Allergen Reactions "     Cephalosporins Hives     Clindamycin Hives     Lisinopril Cough     Penicillins Hives     Varenicline        Family History   Problem Relation Age of Onset     Rheumatoid Arthritis Other        Social History     Socioeconomic History     Marital status:      Spouse name: None     Number of children: None     Years of education: None     Highest education level: None   Occupational History     None   Social Needs     Financial resource strain: None     Food insecurity:     Worry: None     Inability: None     Transportation needs:     Medical: None     Non-medical: None   Tobacco Use     Smoking status: Former Smoker     Smokeless tobacco: Never Used   Substance and Sexual Activity     Alcohol use: None     Drug use: None     Sexual activity: None   Lifestyle     Physical activity:     Days per week: None     Minutes per session: None     Stress: None   Relationships     Social connections:     Talks on phone: None     Gets together: None     Attends [employer] service: None     Active member of club or organization: None     Attends meetings of clubs or organizations: None     Relationship status: None     Intimate partner violence:     Fear of current or ex partner: None     Emotionally abused: None     Physically abused: None     Forced sexual activity: None   Other Topics Concern     Parent/sibling w/ CABG, MI or angioplasty before 65F 55M? Not Asked   Social History Narrative     None       ROS: 10 point ROS neg other than the symptoms noted above in the HPI.  EXAM  Constitutional: healthy, alert and no distress    Psychiatric: mentation appears normal and affect normal/bright    VASCULAR:  -Dorsalis pedis pulse +2/4 b/l  -Posterior tibial pulse +2/4 b/l  -Capillary refill time < 3 seconds to b/l hallux  -Varicosities and telangiectasias to bilateral feet  -Mild-to-moderate 1+ pitting edema to bilateral feet and ankles  NEURO:  -Light touch sensation intact to b/l plantar forefoot  DERM:  -Skin  temperature, texture and turgor WNL b/l  -Dry skin in scaly, red and silver patches to the bilateral plantar surface of the foot  -Toenails elongated, thickened, dystrophic and discolored x 10  MSK:  -Minimal pain on palpation to the RIGHT sinus tarsi -- greatly improved from previous appointment  ---No Pain on palpation but patient points to entire anterior ankle, posterior ankle starting just proximal to the sinus tarsi and around to the lateral foot for areas of greatest pain while walking  -Muscle strength of ankles +5/5 for dorsiflexion, plantarflexion, ABDUction and ADDuction b/l  -Decreased ROM of 1st MTPJ with forefoot loading   -Ankle joint passive ROM within normal limits except for dorsiflexion:    Dorsiflexion, RIGHT Straight knee 0 degrees    Dorsiflexion, RIGHT Bent knee +5 degrees past vertical    Dorsiflexion, LEFT Straight knee 0 degrees    Dorsiflexion, LEFT Bent knee +5 degrees past vertical    ============================================================    ASSESSMENT:  (M25.571,  G89.29) Chronic pain of right ankle  (primary encounter diagnosis)    (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 Treatment options discussed with no educational barriers noted.  -Patient still advised to try compression socks  -Continue stability Shoe Gear  -Continue PT and PT stretches at home  ---Patient is attending PT at [facility] in [place], [place]  -Continue icing  -Continue supportive sandals or shoes at home    -Orthotist appointment shceduled for 10/31/2019 for AFO and CMO  ---Patient will see the orthotist and adjust to her new devices.    -Discussed with patient that the above treatments will unlikely individually eliminate her pain, but in combination, she may have some pain relief to bring her daily pain level down. She is in agreement with this plan. Also discussed surgical options including  a STJ fusion. She may consider this if the above treatments do not decrease her pain.  -Patient may consider a bilateral sinus tarsi injection if pain does not improve    -Patient in agreement with the above treatment plan and all of patient's questions were answered.      RTC as needed        Lilia Youngblood DPM

## 2019-10-22 NOTE — NURSING NOTE
Patient's blood pressure remained elevated through out visit, She was advised to go to ER or UC, patient refused. Patient stated she will go see her primary as one of her blood pressure medication was recently changed.

## 2019-11-04 ENCOUNTER — OFFICE VISIT (OUTPATIENT)
Dept: FAMILY MEDICINE | Facility: OTHER | Age: 64
End: 2019-11-04
Attending: FAMILY MEDICINE
Payer: COMMERCIAL

## 2019-11-04 ENCOUNTER — TRANSFERRED RECORDS (OUTPATIENT)
Dept: HEALTH INFORMATION MANAGEMENT | Facility: CLINIC | Age: 64
End: 2019-11-04

## 2019-11-04 VITALS
RESPIRATION RATE: 20 BRPM | HEIGHT: 66 IN | HEART RATE: 97 BPM | SYSTOLIC BLOOD PRESSURE: 172 MMHG | DIASTOLIC BLOOD PRESSURE: 82 MMHG | TEMPERATURE: 96.9 F | OXYGEN SATURATION: 98 % | WEIGHT: 149.4 LBS | BODY MASS INDEX: 24.01 KG/M2

## 2019-11-04 DIAGNOSIS — G89.4 CHRONIC PAIN SYNDROME: ICD-10-CM

## 2019-11-04 DIAGNOSIS — B00.1 RECURRENT COLD SORES: ICD-10-CM

## 2019-11-04 DIAGNOSIS — Z23 NEED FOR PROPHYLACTIC VACCINATION AND INOCULATION AGAINST INFLUENZA: ICD-10-CM

## 2019-11-04 DIAGNOSIS — I10 ESSENTIAL HYPERTENSION: ICD-10-CM

## 2019-11-04 DIAGNOSIS — L40.50 PSORIATIC ARTHRITIS (H): Primary | ICD-10-CM

## 2019-11-04 PROCEDURE — 99214 OFFICE O/P EST MOD 30 MIN: CPT | Performed by: FAMILY MEDICINE

## 2019-11-04 PROCEDURE — G0463 HOSPITAL OUTPT CLINIC VISIT: HCPCS

## 2019-11-04 PROCEDURE — G0463 HOSPITAL OUTPT CLINIC VISIT: HCPCS | Mod: 25

## 2019-11-04 PROCEDURE — 90686 IIV4 VACC NO PRSV 0.5 ML IM: CPT

## 2019-11-04 PROCEDURE — 90471 IMMUNIZATION ADMIN: CPT | Performed by: FAMILY MEDICINE

## 2019-11-04 RX ORDER — HYDROCODONE BITARTRATE AND ACETAMINOPHEN 5; 325 MG/1; MG/1
TABLET ORAL
Qty: 60 TABLET | Refills: 0 | Status: SHIPPED | OUTPATIENT
Start: 2019-11-04 | End: 2020-03-04

## 2019-11-04 RX ORDER — HYDROCHLOROTHIAZIDE 12.5 MG/1
25 TABLET ORAL DAILY
Qty: 90 TABLET | Refills: 3 | Status: SHIPPED | OUTPATIENT
Start: 2019-11-04 | End: 2020-06-08

## 2019-11-04 RX ORDER — ACYCLOVIR 50 MG/G
OINTMENT TOPICAL
Qty: 30 G | Refills: 3 | Status: SHIPPED | OUTPATIENT
Start: 2019-11-04

## 2019-11-04 ASSESSMENT — PAIN SCALES - GENERAL: PAINLEVEL: NO PAIN (0)

## 2019-11-04 ASSESSMENT — MIFFLIN-ST. JEOR: SCORE: 1254.42

## 2019-11-04 NOTE — NURSING NOTE
"Chief Complaint   Patient presents with     Other     blood pressure check     Patient is here to have blood pressure checked and talk about med management.     Initial BP (!) 172/82   Pulse 97   Temp 96.9  F (36.1  C) (Tympanic)   Resp 20   Ht 1.676 m (5' 6\")   Wt 67.8 kg (149 lb 6.4 oz)   SpO2 98%   BMI 24.11 kg/m   Estimated body mass index is 24.11 kg/m  as calculated from the following:    Height as of this encounter: 1.676 m (5' 6\").    Weight as of this encounter: 67.8 kg (149 lb 6.4 oz).  Medication Reconciliation: complete    Peggy Sarmiento LPN  "

## 2019-11-05 ASSESSMENT — ENCOUNTER SYMPTOMS
FEVER: 0
MYALGIAS: 1
CHILLS: 0
PSYCHIATRIC NEGATIVE: 1
ARTHRALGIAS: 1
DIZZINESS: 0
EYES NEGATIVE: 1
RESPIRATORY NEGATIVE: 1

## 2019-11-05 NOTE — PROGRESS NOTES
SUBJECTIVE:   Amanda Gomez is a 64 year old female who presents to clinic today for the following health issues: Medication renewal    Patient arrives here for medication renewal.  She is in need of acyclovir topical for cold sores.  She was recently admitted to the hospital and was found to have a very low potassium.  Her hydrochlorothiazide was discontinued but she is noticed her blood pressure has increased.  Patient also has a history of psoriatic arthritis.  She does take hydrocodone on a as needed basis.  Averages less than 1/day.  She is been on it for years.  She states the she only use it when her arthritis flares up.  Patient also needs to establish care.  She does not have a up-to-date narcotic contract..        Patient Active Problem List    Diagnosis Date Noted     Babesiosis 09/04/2019     Priority: Medium     Hypokalemia 08/11/2019     Priority: Medium     Fever 08/11/2019     Priority: Medium     History of dysplastic nevus 12/30/2015     Priority: Medium     Overview:   Mid-upper back 2014, mild       Recurrent cold sores 05/27/2015     Priority: Medium     Dysplastic nevus 10/06/2014     Priority: Medium     Pain in joint of left shoulder 09/23/2014     Priority: Medium     Overview:   IMO Update       Chronic pain syndrome 06/12/2014     Priority: Medium     Overview:   [facility] Agreement for Opioid Treatment.        Distal radius fracture, right 03/08/2014     Priority: Medium     Osteopenia 03/08/2014     Priority: Medium     Essential hypertension 08/02/2011     Priority: Medium     Positive GABE (antinuclear antibody) 03/27/2008     Priority: Medium     Overview:   2008: Borderline positive dsDNA, SSB / La, and RNP. ? Significance, on Humira.       Psoriatic arthritis (H) 01/08/2008     Priority: Medium     Overview:   Onset ~ 2007; Raptiva (no benefit); MTX trial (2009), Humira ([id number]), Enbrel 4/2016       Psoriasis with pustules 10/28/2007     Priority: Medium     Pain in  joint, pelvic region and thigh 12/20/2006     Priority: Medium     Overview:   IMO Update 10/11       Moderate recurrent major depression (H) 10/17/2003     Priority: Medium     Overview:   with mixed anxiety       Tobacco use disorder 07/09/2003     Priority: Medium     History reviewed. No pertinent past medical history.   History reviewed. No pertinent surgical history.  Social History     Patient does not qualify to have social determinant information on file (likely too young).   Social History Narrative    Lives in [facility] with her , quit smoking 22 months ago, no significant alcohol consumption.     Current Outpatient Medications   Medication Sig Dispense Refill     acetaminophen (TYLENOL) 500 MG tablet Take 500 mg by mouth every 6 hours as needed for mild pain       acyclovir (ZOVIRAX) 5 % external ointment Apply topically every 3 hours as needed 30 g 3     amLODIPine (NORVASC) 5 MG tablet TAKE 1 TABLET(5 MG) BY MOUTH DAILY 90 tablet 3     aspirin 81 MG EC tablet Take 81 mg by mouth daily       buPROPion (WELLBUTRIN XL) 300 MG 24 hr tablet Take 300 mg by mouth daily        calcium carbonate (OS-MORENA) 500 MG tablet Take 1 tablet by mouth daily       etanercept (ENBREL SURECLICK) 50 MG/ML autoinjector Inject 50 mg Subcutaneous once a week        hydrochlorothiazide (HYDRODIURIL) 12.5 MG tablet Take 2 tablets (25 mg) by mouth daily 90 tablet 3     HYDROcodone-acetaminophen (NORCO) 5-325 MG tablet take one tablet every 8 hours as needed for pain 60 tablet 0     ibuprofen (ADVIL/MOTRIN) 800 MG tablet Take 1 tablet (800 mg) by mouth 3 times daily 90 tablet 3     magnesium 250 MG tablet Take 2 tablets by mouth daily       melatonin 5 MG CAPS Take 1 capsule by mouth At Bedtime       Milk Thistle-Dand-Fennel-Licor (MILK THISTLE XTRA) CAPS capsule Take 1 capsule by mouth daily       mometasone (ELOCON) 0.1 % external cream Apply topically daily as needed       mupirocin (BACTROBAN) 2 % external ointment Apply  "topically 3 times daily as needed       ranitidine (ZANTAC) 300 MG tablet Take 300 mg by mouth every evening as needed for heartburn       vitamin D3 (CHOLECALCIFEROL) 2000 units (50 mcg) tablet Take 4 tablets by mouth daily       calcipotriene (DOVONOX) 0.005 % external ointment Apply topically 2 times daily as needed       lidocaine (LIDODERM) 5 % patch Place 1 patch onto the skin daily as needed for moderate pain To prevent lidocaine toxicity, patient should be patch free for 12 hrs daily.       valACYclovir (VALTREX) 500 MG tablet Take 500 mg by mouth 2 times daily as needed       Allergies   Allergen Reactions     Cephalosporins Hives     Clindamycin Hives     Lisinopril Cough     Penicillins Hives     Varenicline        Review of Systems   Constitutional: Negative for chills and fever.   Eyes: Negative.    Respiratory: Negative.    Cardiovascular: Negative for chest pain.   Genitourinary: Negative.    Musculoskeletal: Positive for arthralgias and myalgias.   Neurological: Negative for dizziness.   Psychiatric/Behavioral: Negative.         OBJECTIVE:     BP (!) 172/82   Pulse 97   Temp 96.9  F (36.1  C) (Tympanic)   Resp 20   Ht 1.676 m (5' 6\")   Wt 67.8 kg (149 lb 6.4 oz)   SpO2 98%   BMI 24.11 kg/m    Body mass index is 24.11 kg/m .  Physical Exam    Diagnostic Test Results:  none     ASSESSMENT/PLAN:         1. Need for prophylactic vaccination and inoculation against influenza  Update update chronic pain contract.  - Vaccine Administration, Initial [07649]    2. Psoriatic arthritis (H)   reviewed and appropriate    3. Chronic pain syndrome    - HYDROcodone-acetaminophen (NORCO) 5-325 MG tablet; take one tablet every 8 hours as needed for pain  Dispense: 60 tablet; Refill: 0    4. Recurrent cold sores  Currently doing well  - acyclovir (ZOVIRAX) 5 % external ointment; Apply topically every 3 hours as needed  Dispense: 30 g; Refill: 3    5. Essential hypertension  Patient arrives here for medication " renewal.    Discussed adding the hydrochlorothiazide back on.  And patient is agreeable.  Will monitor potassium closely.  - hydrochlorothiazide (HYDRODIURIL) 12.5 MG tablet; Take 2 tablets (25 mg) by mouth daily  Dispense: 90 tablet; Refill: 3      Ryder Stack MD  [facility]

## 2019-11-25 ENCOUNTER — TRANSFERRED RECORDS (OUTPATIENT)
Dept: HEALTH INFORMATION MANAGEMENT | Facility: OTHER | Age: 64
End: 2019-11-25

## 2020-01-22 ENCOUNTER — TRANSFERRED RECORDS (OUTPATIENT)
Dept: HEALTH INFORMATION MANAGEMENT | Facility: OTHER | Age: 65
End: 2020-01-22

## 2020-03-04 ENCOUNTER — OFFICE VISIT (OUTPATIENT)
Dept: FAMILY MEDICINE | Facility: OTHER | Age: 65
End: 2020-03-04
Attending: FAMILY MEDICINE
Payer: COMMERCIAL

## 2020-03-04 VITALS
OXYGEN SATURATION: 98 % | HEART RATE: 68 BPM | RESPIRATION RATE: 20 BRPM | SYSTOLIC BLOOD PRESSURE: 168 MMHG | TEMPERATURE: 97.5 F | BODY MASS INDEX: 23.4 KG/M2 | HEIGHT: 66 IN | WEIGHT: 145.6 LBS | DIASTOLIC BLOOD PRESSURE: 70 MMHG

## 2020-03-04 DIAGNOSIS — I10 ESSENTIAL HYPERTENSION: ICD-10-CM

## 2020-03-04 DIAGNOSIS — Z87.891 PERSONAL HISTORY OF TOBACCO USE: ICD-10-CM

## 2020-03-04 DIAGNOSIS — Z87.891 HISTORY OF TOBACCO ABUSE: ICD-10-CM

## 2020-03-04 DIAGNOSIS — Z12.11 SPECIAL SCREENING FOR MALIGNANT NEOPLASMS, COLON: ICD-10-CM

## 2020-03-04 DIAGNOSIS — L40.50 PSORIATIC ARTHRITIS (H): ICD-10-CM

## 2020-03-04 DIAGNOSIS — G89.4 CHRONIC PAIN SYNDROME: Primary | ICD-10-CM

## 2020-03-04 PROCEDURE — G0463 HOSPITAL OUTPT CLINIC VISIT: HCPCS

## 2020-03-04 PROCEDURE — 99214 OFFICE O/P EST MOD 30 MIN: CPT | Mod: 25 | Performed by: FAMILY MEDICINE

## 2020-03-04 PROCEDURE — G0296 VISIT TO DETERM LDCT ELIG: HCPCS | Performed by: FAMILY MEDICINE

## 2020-03-04 PROCEDURE — G0463 HOSPITAL OUTPT CLINIC VISIT: HCPCS | Mod: 25

## 2020-03-04 RX ORDER — HYDROCODONE BITARTRATE AND ACETAMINOPHEN 5; 325 MG/1; MG/1
TABLET ORAL
Qty: 60 TABLET | Refills: 0 | Status: SHIPPED | OUTPATIENT
Start: 2020-03-04 | End: 2020-06-08

## 2020-03-04 RX ORDER — AMLODIPINE BESYLATE 10 MG/1
10 TABLET ORAL DAILY
Qty: 90 TABLET | Refills: 3 | Status: SHIPPED | OUTPATIENT
Start: 2020-03-04 | End: 2020-06-08

## 2020-03-04 ASSESSMENT — ANXIETY QUESTIONNAIRES
7. FEELING AFRAID AS IF SOMETHING AWFUL MIGHT HAPPEN: NOT AT ALL
GAD7 TOTAL SCORE: 0
3. WORRYING TOO MUCH ABOUT DIFFERENT THINGS: NOT AT ALL
1. FEELING NERVOUS, ANXIOUS, OR ON EDGE: NOT AT ALL
5. BEING SO RESTLESS THAT IT IS HARD TO SIT STILL: NOT AT ALL
2. NOT BEING ABLE TO STOP OR CONTROL WORRYING: NOT AT ALL
6. BECOMING EASILY ANNOYED OR IRRITABLE: NOT AT ALL

## 2020-03-04 ASSESSMENT — MIFFLIN-ST. JEOR: SCORE: 1237.19

## 2020-03-04 ASSESSMENT — ENCOUNTER SYMPTOMS
JOINT SWELLING: 1
RESPIRATORY NEGATIVE: 1
DIZZINESS: 0
FEVER: 0
EYES NEGATIVE: 1
PSYCHIATRIC NEGATIVE: 1
CHILLS: 0
ARTHRALGIAS: 1

## 2020-03-04 ASSESSMENT — PAIN SCALES - GENERAL: PAINLEVEL: NO PAIN (0)

## 2020-03-04 ASSESSMENT — PATIENT HEALTH QUESTIONNAIRE - PHQ9
SUM OF ALL RESPONSES TO PHQ QUESTIONS 1-9: 0
5. POOR APPETITE OR OVEREATING: NOT AT ALL

## 2020-03-04 NOTE — PATIENT INSTRUCTIONS

## 2020-03-04 NOTE — PROGRESS NOTES
SUBJECTIVE:   Amanda Gomez is a 64 year old female who presents to clinic today for the following health issues: Blood pressure check medication refill    Patient arrives here for blood pressure check and medication refill.  She is requesting refill of her Norco.  She got back in November for 60 tablets.  States that she uses it sparingly.  Indicates that mainly for her ankles.  She does have a history of psoriatic arthritis.  Indicates she was recently seen by orthopedics and they recommended fusion of her ankles.  But she states she like to hold off as long as possible.  She also arrives here for  blood pressure check.  She is not on any blood pressure checks at home.  She is on hydrochlorothiazide and also amlodipine.  25 mg of the hydrochlorothiazide 5 of the amlodipine.  Review of the chart shows that she has a 35-year pack history.  She is quit within the last 5 years.  He is not having symptoms of cough.        Patient Active Problem List    Diagnosis Date Noted     Babesiosis 09/04/2019     Priority: Medium     Hypokalemia 08/11/2019     Priority: Medium     Fever 08/11/2019     Priority: Medium     History of dysplastic nevus 12/30/2015     Priority: Medium     Overview:   Mid-upper back 2014, mild       Recurrent cold sores 05/27/2015     Priority: Medium     Dysplastic nevus 10/06/2014     Priority: Medium     Pain in joint of left shoulder 09/23/2014     Priority: Medium     Overview:   IMO Update       Chronic pain syndrome 06/12/2014     Priority: Medium     Overview:   [facility] Agreement for Opioid Treatment.        Distal radius fracture, right 03/08/2014     Priority: Medium     Osteopenia 03/08/2014     Priority: Medium     Essential hypertension 08/02/2011     Priority: Medium     Positive GABE (antinuclear antibody) 03/27/2008     Priority: Medium     Overview:   2008: Borderline positive dsDNA, SSB / La, and RNP. ? Significance, on Humira.       Psoriatic arthritis (H) 01/08/2008      Priority: Medium     Overview:   Onset ~ 2007; Raptiva (no benefit); MTX trial (2009), Humira ([id number]), Enbrel 4/2016       Psoriasis with pustules 10/28/2007     Priority: Medium     Pain in joint, pelvic region and thigh 12/20/2006     Priority: Medium     Overview:   IMO Update 10/11       Moderate recurrent major depression (H) 10/17/2003     Priority: Medium     Overview:   with mixed anxiety       Tobacco use disorder 07/09/2003     Priority: Medium     History reviewed. No pertinent past medical history.   History reviewed. No pertinent surgical history.  Social History     Social History Narrative    Lives in [facility] with her , quit smoking 22 months ago, no significant alcohol consumption.     Current Outpatient Medications   Medication Sig Dispense Refill     acetaminophen (TYLENOL) 500 MG tablet Take 500 mg by mouth every 6 hours as needed for mild pain       acyclovir (ZOVIRAX) 5 % external ointment Apply topically every 3 hours as needed 30 g 3     amLODIPine (NORVASC) 10 MG tablet Take 1 tablet (10 mg) by mouth daily 90 tablet 3     aspirin 81 MG EC tablet Take 81 mg by mouth daily       calcipotriene (DOVONOX) 0.005 % external ointment Apply topically 2 times daily as needed       calcium carbonate (OS-MORENA) 500 MG tablet Take 1 tablet by mouth daily       etanercept (ENBREL SURECLICK) 50 MG/ML autoinjector Inject 50 mg Subcutaneous once a week        hydrochlorothiazide (HYDRODIURIL) 12.5 MG tablet Take 2 tablets (25 mg) by mouth daily 90 tablet 3     HYDROcodone-acetaminophen (NORCO) 5-325 MG tablet take one tablet every 8 hours as needed for pain 60 tablet 0     ibuprofen (ADVIL/MOTRIN) 800 MG tablet Take 1 tablet (800 mg) by mouth 3 times daily 90 tablet 3     lidocaine (LIDODERM) 5 % patch Place 1 patch onto the skin daily as needed for moderate pain To prevent lidocaine toxicity, patient should be patch free for 12 hrs daily.       magnesium 250 MG tablet Take 2 tablets by mouth daily   "     mometasone (ELOCON) 0.1 % external cream Apply topically daily as needed       mupirocin (BACTROBAN) 2 % external ointment Apply topically 3 times daily as needed       ranitidine (ZANTAC) 300 MG tablet Take 300 mg by mouth every evening as needed for heartburn       vitamin D3 (CHOLECALCIFEROL) 2000 units (50 mcg) tablet Take 4 tablets by mouth daily       melatonin 5 MG CAPS Take 1 capsule by mouth At Bedtime       Milk Thistle-Dand-Fennel-Licor (MILK THISTLE XTRA) CAPS capsule Take 1 capsule by mouth daily       valACYclovir (VALTREX) 500 MG tablet Take 500 mg by mouth 2 times daily as needed       Allergies   Allergen Reactions     Cephalosporins Hives     Clindamycin Hives     Lisinopril Cough     Penicillins Hives     Varenicline        Review of Systems   Constitutional: Negative for chills and fever.   Eyes: Negative.    Respiratory: Negative.    Cardiovascular: Negative for chest pain.   Genitourinary: Negative.    Musculoskeletal: Positive for arthralgias and joint swelling.   Neurological: Negative for dizziness.   Psychiatric/Behavioral: Negative.         OBJECTIVE:     BP (!) 168/70   Pulse 68   Temp 97.5  F (36.4  C)   Resp 20   Ht 1.676 m (5' 6\")   Wt 66 kg (145 lb 9.6 oz)   SpO2 98%   BMI 23.50 kg/m    Body mass index is 23.5 kg/m .  Physical Exam  Constitutional:       Appearance: Normal appearance.   HENT:      Head: Normocephalic.      Mouth/Throat:      Mouth: Mucous membranes are moist.   Eyes:      Pupils: Pupils are equal, round, and reactive to light.   Cardiovascular:      Rate and Rhythm: Normal rate and regular rhythm.   Pulmonary:      Effort: Pulmonary effort is normal.      Breath sounds: Normal breath sounds.   Skin:     General: Skin is warm.   Neurological:      Mental Status: She is alert.   Psychiatric:         Mood and Affect: Mood normal.         Thought Content: Thought content normal.         Diagnostic Test Results:  none     ASSESSMENT/PLAN:         1. Psoriatic " arthritis (H)  Refill hydrocodone.  2. Chronic pain syndrome    - HYDROcodone-acetaminophen (NORCO) 5-325 MG tablet; take one tablet every 8 hours as needed for pain  Dispense: 60 tablet; Refill: 0    3. Essential hypertension  Increase Norvasc to 10 mg a day- amLODIPine (NORVASC) 10 MG tablet; Take 1 tablet (10 mg) by mouth daily  Dispense: 90 tablet; Refill: 3    4. History of tobacco abuse  History of tobacco abuse.  Greater than 30-pack-year history.  Discussed CT scan screening.  Patient is agreeable.  - Prof Fee: Shared Decision Making Visit for Lung Cancer Screening  - CT Chest Lung Cancer Scrn Low Dose wo; Future    5. Personal history of tobacco use          Ryder Stack MD  [facility] Cancer Screening Shared Decision Making Visit     Amanda Gomez is eligible for lung cancer screening on the basis of the information provided in my signed lung cancer screening order.     I have discussed with patient the risks and benefits of screening for lung cancer with low-dose CT.     The risks include:  radiation exposure: one low dose chest CT has as much ionizing radiation as about 15 chest x-rays or 6 months of background radiation living in [place]    false positives: 96% of positive findings/nodules are NOT cancer, but some might still require additional diagnostic evaluation, including biopsy  over-diagnosis: some slow growing cancers that might never have been clinically significant will be detected and treated unnecessarily     The benefit of early detection of lung cancer is contingent upon adherence to annual screening or more frequent follow up if indicated.     Furthermore, reaping the benefits of screening requires Amanda Gomez to be willing and physically able to undergo diagnostic procedures, if indicated. Although no specific guide is available for determining severity of comorbidities, it is reasonable to withhold screening in patients who have greater mortality  risk from other diseases.     We did discuss that the only way to prevent lung cancer is to not smoke. Smoking cessation assistance was not offered.    I did offer risk estimation using a calculator such as this one:    ShouldIScreen

## 2020-03-04 NOTE — NURSING NOTE
Patient here for blood pressure check and she has been on two different ones and she would like to get off one. She would like a refill on the Norco as well. Medication Reconciliation: complete.    Rimma Ann LPN  3/4/2020 11:36 AM

## 2020-03-05 ASSESSMENT — ANXIETY QUESTIONNAIRES: GAD7 TOTAL SCORE: 0

## 2020-03-10 ENCOUNTER — HOSPITAL ENCOUNTER (OUTPATIENT)
Dept: CT IMAGING | Facility: OTHER | Age: 65
Discharge: HOME | End: 2020-03-10
Attending: FAMILY MEDICINE | Admitting: FAMILY MEDICINE
Payer: COMMERCIAL

## 2020-03-10 DIAGNOSIS — Z87.891 HISTORY OF TOBACCO ABUSE: ICD-10-CM

## 2020-03-10 PROCEDURE — G0297 LDCT FOR LUNG CA SCREEN: HCPCS

## 2020-03-11 ENCOUNTER — TELEPHONE (OUTPATIENT)
Dept: FAMILY MEDICINE | Facility: OTHER | Age: 65
End: 2020-03-11

## 2020-03-11 NOTE — TELEPHONE ENCOUNTER
Pia calling to make sure that we received the chest CT results form 03/10/2020. Rimma Ann LPN .......................3/11/2020  1:10 PM

## 2020-04-02 ENCOUNTER — VIRTUAL VISIT (OUTPATIENT)
Dept: FAMILY MEDICINE | Facility: OTHER | Age: 65
End: 2020-04-02
Attending: FAMILY MEDICINE
Payer: COMMERCIAL

## 2020-04-02 ENCOUNTER — NURSE TRIAGE (OUTPATIENT)
Dept: FAMILY MEDICINE | Facility: OTHER | Age: 65
End: 2020-04-02

## 2020-04-02 VITALS
WEIGHT: 141 LBS | SYSTOLIC BLOOD PRESSURE: 132 MMHG | TEMPERATURE: 97.5 F | DIASTOLIC BLOOD PRESSURE: 72 MMHG | BODY MASS INDEX: 22.76 KG/M2

## 2020-04-02 DIAGNOSIS — G57.01 LESION OF SCIATIC NERVE, RIGHT: Primary | ICD-10-CM

## 2020-04-02 PROCEDURE — 99212 OFFICE O/P EST SF 10 MIN: CPT | Mod: TEL | Performed by: FAMILY MEDICINE

## 2020-04-02 RX ORDER — PREDNISONE 20 MG/1
TABLET ORAL
Qty: 20 TABLET | Refills: 0 | Status: SHIPPED | OUTPATIENT
Start: 2020-04-02 | End: 2020-06-08

## 2020-04-02 ASSESSMENT — PAIN SCALES - GENERAL: PAINLEVEL: MODERATE PAIN (5)

## 2020-04-02 NOTE — PROGRESS NOTES
"Subjective     Amanda Gomez is a 64 year old female who is being evaluated via a billable telephone visit.      The patient has been notified of following:     \"This telephone visit will be conducted via a call between you and your physician/provider. We have found that certain health care needs can be provided without the need for a physical exam.  This service lets us provide the care you need with a short phone conversation.  If a prescription is necessary we can send it directly to your pharmacy.  If lab work is needed we can place an order for that and you can then stop by our lab to have the test done at a later time.    If during the course of the call the physician/provider feels a telephone visit is not appropriate, you will not be charged for this service.\"     Patient has given verbal consent for Telephone visit?  Yes    Amanda Gomez complains of   Chief Complaint   Patient presents with     Back Pain       ALLERGIES  Cephalosporins; Clindamycin; Lisinopril; Penicillins; and Varenicline                   Reviewed and updated as needed this visit by Provider         Review of Systems      Patient calls indicating she has been having back and buttocks pain going down the right leg.  The back of the leg.  Symptoms started on Tuesday around 1:00.  She had been teaching an exercise class.  She has something similar 15 years ago and reports that it prednisone worked very well.  There is no changes in bowel or bladder habits.  She is doing stretching exercises without much relief.  She does have psoriasis.  But reports that her psoriasis is very stable  and she is not having any skin lesions.  She is taken ibuprofen and 1 dose of hydrocodone without much relief.  Patient denies any foot drop.  Advised patient likely sciatica.  Will try prednisone.  Patient is cautioned that    Objective   Reported vitals:  /72   Temp 97.5  F (36.4  C) (Oral)   Wt 64 kg (141 lb)   LMP  (LMP Unknown)   " Breastfeeding No   BMI 22.76 kg/m       Psych: Alert ; coherent speech, normal   rate and volume, able to articulate logical thoughts, able   to abstract reason, no tangential thoughts,     Diagnostic Test Results:  Labs reviewed in Epic        Assessment/Plan:  1. Lesion of sciatic nerve, right   Advised patient this is very likely sciatica.  Will try prednisone but advised this could exacerbate her psoriasis.  She is willing to pursue this.  She does not have any foot drop or other concerning signs of bowel or bladder changes.   - predniSONE (DELTASONE) 20 MG tablet; Take 3 tabs by mouth daily x 3 days, then 2 tabs daily x 3 days, then 1 tab daily x 3 days, then 1/2 tab daily x 3 days.  Dispense: 20 tablet; Refill: 0    No follow-ups on file.      Phone call duration:  14 minutes    Ryder Stack MD

## 2020-04-02 NOTE — NURSING NOTE
Patient is having telephone visit for sciatic back pain. States started Tuesday about 1 pm. Reason is unknown, no injury or cause that she can think of.  Nora Diaz LPN .............4/2/2020     12:58 PM      No LMP recorded (lmp unknown). Patient has had a hysterectomy.  Medication Reconciliation: complete    Nora Diaz LPN  4/2/2020 1:03 PM

## 2020-04-02 NOTE — TELEPHONE ENCOUNTER
Pt c/o problems with sciatica. Called and spoke to Patient after verifying last name and date of birth. Pt triaged:    Additional Information    Negative: Passed out (i.e., fainted, collapsed and was not responding)    Negative: Shock suspected (e.g., cold/pale/clammy skin, too weak to stand, low BP, rapid pulse)    Negative: Sounds like a life-threatening emergency to the triager    Negative: Major injury to the back (e.g., MVA, fall > 10 feet or 3 meters, penetrating injury, etc.)    Negative: Pain in the upper back over the ribs (rib cage) that radiates (travels) into the chest    Negative: Pain in the upper back over the ribs (rib cage) and worsened by coughing (or clearly increases with breathing)    Negative: SEVERE back pain of sudden onset and age > 60    Negative: SEVERE abdominal pain (e.g., excruciating)    Negative: Abdominal pain and age > 60    Negative: Unable to urinate (or only a few drops) and bladder feels very full    Negative: Loss of bladder or bowel control (urine or bowel incontinence; wetting self, leaking stool) of new onset    Negative: Numbness (loss of sensation) in groin or rectal area    Negative: Pain radiates into groin, scrotum    Negative: Blood in urine (red, pink, or tea-colored)    Negative: Vomiting and pain over lower ribs of back (i.e., flank - kidney area)    Negative: Weakness of a leg or foot (e.g., unable to bear weight, dragging foot)    Negative: Patient sounds very sick or weak to the triager    Negative: Fever > 100.5 F (38.1 C) and flank pain    Negative: Pain or burning with passing urine (urination)    Negative: SEVERE back pain (e.g., excruciating, unable to do any normal activities) and not improved after pain medicine and CARE ADVICE    Negative: Numbness in an arm or hand (i.e., loss of sensation) and upper back pain    Negative: Numbness in a leg or foot (i.e., loss of sensation)    Negative: High-risk adult (e.g., history of cancer, history of HIV, or history  "of IV drug abuse)    Negative: Painful rash with multiple small blisters grouped together (i.e., dermatomal distribution or 'band' or 'stripe')    Negative: Pain radiates into the thigh or further down the leg, and in both legs    Pain radiates into the thigh or further down the leg    Patient wants to be seen    Commented on: Age > 50 and no history of prior similar back pain     Similar issues 15 years ago. Suffers from chronic pain and arthritis.    Commented on: MODERATE back pain (e.g., interferes with normal activities) and present > 3 days     Day #3.    Answer Assessment - Initial Assessment Questions  1. ONSET: \"When did the pain begin?\"   Tuesday    2. LOCATION: \"Where does it hurt?\" (upper, mid or lower back)  Right - lower back/above hip bone, radiates down back of right leg    3. SEVERITY: \"How bad is the pain?\"  (e.g., Scale 1-10; mild, moderate, or severe)  MODERATE (5/10- 2/10 when laying still), interferes with normal activities (sleeping, driving, sitting on toilet or chair)    4. PATTERN: \"Is the pain constant?\" (e.g., yes, no; constant, intermittent)   Constant- better with repositioning herself    5. RADIATION: \"Does the pain shoot into your legs or elsewhere?\"  See above.    6. CAUSE:  \"What do you think is causing the back pain?\"   Sciatica pain.    7. BACK OVERUSE:  \"Any recent lifting of heavy objects, strenuous work or exercise?\"  No    8. MEDICATIONS: \"What have you taken so far for the pain?\" (e.g., nothing, acetaminophen, NSAIDS)  Norco (took 1 last night), Ibuprofen 800 mg (took last night and this morning)  Minimal relief with medications.     Also some relief with ice and stretching.    9. NEUROLOGIC SYMPTOMS: \"Do you have any weakness, numbness, or problems with bowel/bladder control?\"  Denies.    10. OTHER SYMPTOMS: \"Do you have any other symptoms?\" (e.g., fever, abdominal pain, burning with urination, blood in urine)  Denies.    Pt states she works at [employer] and their " temperature is checked for fever (r/o COVID-19) upon arrival and when she leaves. Denies: cough, SOB.    Similar problem 15 years ago.    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 Not being able to walk has been very frustrating for her.    Protocols used: BACK PAIN-A-OH    Patient states she set up a therapy appointment tomorrow at 0830. Pt unable to drive, but has a ride arranged. Pt is wondering if provider would consider prescribing her a muscle relaxer. Protocol recommends Pt be seen with 3 days. Pt is interested in being seen sooner. Writer suggested telephone visit with Dr. Stack. The patient indicates understanding of these issues and agrees with the plan. Pt urged to call back if her symptoms change or worsen. Pt transferred to scheduling line, for assistance in scheduling this.     Next 5 appointments (look out 90 days)    Apr 02, 2020  1:00 PM CDT  Telephone Visit with Ryder Stack MD  [facility] ([facility]) [address]  [place] [address]  [phone]        Sharlene Cheng RN .............. 4/2/2020  10:20 AM

## 2020-04-04 ENCOUNTER — HOSPITAL ENCOUNTER (EMERGENCY)
Facility: OTHER | Age: 65
Discharge: HOME | End: 2020-04-04
Attending: INTERNAL MEDICINE | Admitting: INTERNAL MEDICINE
Payer: COMMERCIAL

## 2020-04-04 VITALS
DIASTOLIC BLOOD PRESSURE: 84 MMHG | HEART RATE: 61 BPM | SYSTOLIC BLOOD PRESSURE: 157 MMHG | OXYGEN SATURATION: 98 % | TEMPERATURE: 97.3 F | HEIGHT: 66 IN | BODY MASS INDEX: 23.14 KG/M2 | WEIGHT: 144 LBS

## 2020-04-04 DIAGNOSIS — M53.3 SACROILIAC JOINT PAIN: ICD-10-CM

## 2020-04-04 DIAGNOSIS — G57.01 PIRIFORMIS SYNDROME, RIGHT: ICD-10-CM

## 2020-04-04 PROCEDURE — 99283 EMERGENCY DEPT VISIT LOW MDM: CPT | Mod: Z6 | Performed by: INTERNAL MEDICINE

## 2020-04-04 PROCEDURE — 99282 EMERGENCY DEPT VISIT SF MDM: CPT | Performed by: INTERNAL MEDICINE

## 2020-04-04 RX ORDER — TIZANIDINE 2 MG/1
2-4 TABLET ORAL 3 TIMES DAILY PRN
Qty: 30 TABLET | Refills: 3 | Status: SHIPPED | OUTPATIENT
Start: 2020-04-04 | End: 2020-06-08

## 2020-04-04 RX ORDER — CYCLOBENZAPRINE HCL 10 MG
5-10 TABLET ORAL 3 TIMES DAILY PRN
Qty: 30 TABLET | Refills: 3 | Status: SHIPPED | OUTPATIENT
Start: 2020-04-04 | End: 2020-06-08

## 2020-04-04 ASSESSMENT — MIFFLIN-ST. JEOR: SCORE: 1229.93

## 2020-04-04 NOTE — ED PROVIDER NOTES
History     Chief Complaint   Patient presents with     Back Pain     HPI  Amanda Gomez is a 64 year old female who presents the emergency room due to ongoing pain in the right buttocks.  Having some pretty significant spasticity there, some sacroiliac joint pain as well.  She occasionally will get some shooting pain through the buttocks down the legs.  She has a bit of tingling on day shin of the right leg.    No bowel or bladder incontinence.  No foot drop.  No saddle numbness.  No signs or symptoms of red flags for cauda equina syndrome.    She would like to get some muscle relaxers.  She is having a lot of muscle spasticity in the buttocks.  She had Robaxin a number of years ago and thinks maybe Flexeril as well because the Robaxin did not help much.    She called into the clinic on April 2 and was given a prednisone taper.  She has taken a few days of this now but her symptoms are not getting any better.    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    Allergies:  Allergies   Allergen Reactions     Cephalosporins Hives     Clindamycin Hives     Lisinopril Cough     Penicillins Hives     Varenicline        Problem List:    Patient Active Problem List    Diagnosis Date Noted     Piriformis syndrome, right 04/04/2020     Priority: Medium     Sacroiliac joint pain 04/04/2020     Priority: Medium     Babesiosis 09/04/2019     Priority: Medium     Hypokalemia 08/11/2019     Priority: Medium     Fever 08/11/2019     Priority: Medium     History of dysplastic nevus 12/30/2015     Priority: Medium     Overview:   Mid-upper back 2014, mild       Recurrent cold sores 05/27/2015     Priority: Medium     Dysplastic nevus 10/06/2014     Priority: Medium     Pain in joint of left shoulder 09/23/2014     Priority: Medium     Overview:   IMO Update       Chronic pain syndrome 06/12/2014     Priority: Medium     Overview:   [facility].        Distal radius fracture, right 03/08/2014     Priority: Medium     Osteopenia 03/08/2014     Priority: Medium     Essential hypertension 08/02/2011     Priority: Medium     Positive GABE (antinuclear antibody) 03/27/2008     Priority: Medium     Overview:   2008: Borderline positive dsDNA, SSB / La, and RNP. ? Significance, on Humira.       Psoriatic arthritis (H) 01/08/2008     Priority: Medium     Overview:   Onset ~ 2007; Raptiva (no benefit); MTX trial (2009), Humira ([id number]), Enbrel 4/2016       Psoriasis with pustules 10/28/2007     Priority: Medium     Pain in joint, pelvic region and thigh 12/20/2006     Priority: Medium     Overview:   IMO Update 10/11       Moderate recurrent major depression (H) 10/17/2003     Priority: Medium     Overview:   with mixed anxiety       Tobacco use disorder 07/09/2003     Priority: Medium        Past Medical History:    History reviewed. No pertinent past medical history.    Past Surgical History:    History reviewed. No pertinent surgical history.    Family History:    Family History   Problem Relation Age of Onset     Rheumatoid Arthritis Other        Social History:  Marital Status:   [2]  Social History     Tobacco Use     Smoking status: Former Smoker     Packs/day: 1.00     Years: 35.00     Pack years: 35.00     Last attempt to quit: 3/4/2017     Years since quitting: 3.0     Smokeless tobacco: Never Used   Substance Use Topics     Alcohol use: Yes     Comment: occassionally     Drug use: Never        Medications:    acetaminophen (TYLENOL) 500 MG tablet  amLODIPine (NORVASC) 10 MG tablet  aspirin 81 MG EC tablet  calcium carbonate (OS-MORENA) 500 MG tablet  cyclobenzaprine (FLEXERIL) 10 MG tablet  etanercept (ENBREL SURECLICK) 50 MG/ML autoinjector  hydrochlorothiazide (HYDRODIURIL) 12.5 MG tablet  HYDROcodone-acetaminophen (NORCO) 5-325 MG tablet  ibuprofen (ADVIL/MOTRIN) 800 MG tablet  lidocaine (LIDODERM) 5 %  "patch  magnesium 250 MG tablet  melatonin 5 MG CAPS  Milk Thistle-Dand-Fennel-Licor (MILK THISTLE XTRA) CAPS capsule  predniSONE (DELTASONE) 20 MG tablet  tiZANidine (ZANAFLEX) 2 MG tablet  vitamin D3 (CHOLECALCIFEROL) 2000 units (50 mcg) tablet  acyclovir (ZOVIRAX) 5 % external ointment  calcipotriene (DOVONOX) 0.005 % external ointment  mometasone (ELOCON) 0.1 % external cream  mupirocin (BACTROBAN) 2 % external ointment  ranitidine (ZANTAC) 300 MG tablet  valACYclovir (VALTREX) 500 MG tablet          Review of Systems   Musculoskeletal: Positive for gait problem.        Sacroiliac pain right greater than left, right gluteal pain with some shooting pain down the posterior leg.   All other systems reviewed and are negative.      Physical Exam   BP: (!) 161/93  Pulse: 78  Temp: 97.3  F (36.3  C)  Height: 167.6 cm (5' 6\")  Weight: 65.3 kg (144 lb)  SpO2: 97 %      Physical Exam  Constitutional:       Appearance: Normal appearance. She is obese.   HENT:      Head: Normocephalic and atraumatic.   Eyes:      General: No scleral icterus.     Conjunctiva/sclera: Conjunctivae normal.   Cardiovascular:      Rate and Rhythm: Normal rate and regular rhythm.   Pulmonary:      Effort: Pulmonary effort is normal.   Abdominal:      General: Abdomen is flat.   Musculoskeletal:         General: Tenderness present.      Comments: Bilateral sacroiliac joint tenderness to palpation right greater than left.  Right Piriformis and gluteal muscle spasticity and tenderness to palpation.   Skin:     General: Skin is warm and dry.      Findings: No rash.   Neurological:      General: No focal deficit present.      Mental Status: She is alert.      Sensory: No sensory deficit.      Deep Tendon Reflexes: Reflexes normal.   Psychiatric:         Mood and Affect: Mood normal.         Behavior: Behavior normal.         ED Course     ED Course as of Apr 04 1311   Sat Apr 04, 2020   1310 Patient evaluated.  No red flag signs or symptoms of cauda " equina syndrome or acute neurologic deficits.Prescription for muscle relaxer sent to pharmacy.  Encouraged chiropractic evaluation.        Procedures               No results found for this or any previous visit (from the past 24 hour(s)).    Medications - No data to display    Assessments & Plan (with Medical Decision Making)     I have reviewed the nursing notes.    I have reviewed the findings, diagnosis, plan and need for follow up with the patient.  Patient discharged home in stable condition.  Advised her to read about piriformis syndrome online, start some stretches at home.    Use muscle relaxers with ibuprofen and tylenol as needed for pain.   Sometimes laying on a ice pack, over the sacroiliac joints can help with pain.   Consider chiropractic adjustment / evaluation.     --Use Flexeril or tizanidine separately for 1 to 3 days, find out which one causes tiredness.  Use that one at night.  Use the one causing least sedation during the day as needed. - monitor for sedation, no driving after use    Follow-up with primary care provider as needed for new or worsening symptoms.      New Prescriptions    CYCLOBENZAPRINE (FLEXERIL) 10 MG TABLET    Take 0.5-1 tablets (5-10 mg) by mouth 3 times daily as needed for muscle spasms (- monitor for sedation, no driving after use)    TIZANIDINE (ZANAFLEX) 2 MG TABLET    Take 1-2 tablets (2-4 mg) by mouth 3 times daily as needed for muscle spasms (- monitor for sedation, no driving after use)       Final diagnoses:   Piriformis syndrome, right   Sacroiliac joint pain       4/4/2020   [facility] AND Myron Oh MD  04/04/20 [id number]

## 2020-04-04 NOTE — ED AVS SNAPSHOT
[facility]  [address]  [place] [address]  Phone:  [phone]  Fax:  [phone]                                    Amanda Gomez   MRN: [id number]    Department:  [facility]   Date of Visit:  4/4/2020           After Visit Summary Signature Page    I have received my discharge instructions, and my questions have been answered. I have discussed any challenges I see with this plan with the nurse or doctor.    ..........................................................................................................................................  Patient/Patient Representative Signature      ..........................................................................................................................................  Patient Representative Print Name and Relationship to Patient    ..................................................               ................................................  Date                                   Time    ..........................................................................................................................................  Reviewed by Signature/Title    ...................................................              ..............................................  Date                                               Time          22EPIC Rev 08/18

## 2020-04-04 NOTE — ED TRIAGE NOTES
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  She's been trying different exercises & stretches, PT, but her pain is persistent.  Pain is lower back pain/right upper hip pain and radiates down her right leg.  She's been unable to lay/stand/sit.  She asked her PCP for a muscle relaxer and he gave her 20 mg Prednisone instead.  She also takes 1/2 tab of Norco but it's not helping her pain.  She's been alternating heat/ice.

## 2020-04-04 NOTE — DISCHARGE INSTRUCTIONS
Read about piriformis syndrome online.     Use muscle relaxers with ibuprofen and tylenol as needed for pain.   Sometimes laying on a ice pack, over the sacroiliac joints can help with pain.   Consider chiropractic adjustment / evaluation.     --Use Flexeril or tizanidine separately for 1 to 3 days, find out which one causes tiredness.  Use that one at night.  Use the one causing least sedation during the day as needed. - monitor for sedation, no driving after use    Follow-up with primary care provider as needed for new or worsening symptoms.

## 2020-04-27 ENCOUNTER — TRANSFERRED RECORDS (OUTPATIENT)
Dept: HEALTH INFORMATION MANAGEMENT | Facility: OTHER | Age: 65
End: 2020-04-27

## 2020-06-08 ENCOUNTER — OFFICE VISIT (OUTPATIENT)
Dept: FAMILY MEDICINE | Facility: OTHER | Age: 65
End: 2020-06-08
Attending: FAMILY MEDICINE
Payer: COMMERCIAL

## 2020-06-08 VITALS
DIASTOLIC BLOOD PRESSURE: 58 MMHG | HEART RATE: 76 BPM | SYSTOLIC BLOOD PRESSURE: 124 MMHG | TEMPERATURE: 97.9 F | RESPIRATION RATE: 20 BRPM | WEIGHT: 144.8 LBS | BODY MASS INDEX: 23.37 KG/M2

## 2020-06-08 DIAGNOSIS — G89.4 CHRONIC PAIN SYNDROME: ICD-10-CM

## 2020-06-08 DIAGNOSIS — E87.6 HYPOKALEMIA: ICD-10-CM

## 2020-06-08 DIAGNOSIS — L40.50 PSORIATIC ARTHRITIS (H): ICD-10-CM

## 2020-06-08 DIAGNOSIS — I10 ESSENTIAL HYPERTENSION: Primary | ICD-10-CM

## 2020-06-08 PROBLEM — R50.9 FEVER: Status: RESOLVED | Noted: 2019-08-11 | Resolved: 2020-06-08

## 2020-06-08 LAB
ANION GAP SERPL CALCULATED.3IONS-SCNC: 11 MMOL/L (ref 3–14)
BUN SERPL-MCNC: 20 MG/DL (ref 7–25)
CALCIUM SERPL-MCNC: 9.3 MG/DL (ref 8.6–10.3)
CHLORIDE SERPL-SCNC: 101 MMOL/L (ref 98–107)
CO2 SERPL-SCNC: 26 MMOL/L (ref 21–31)
CREAT SERPL-MCNC: 0.87 MG/DL (ref 0.6–1.2)
GFR SERPL CREATININE-BSD FRML MDRD: ABNORMAL ML/MIN/{1.73_M2}
GLUCOSE SERPL-MCNC: 115 MG/DL (ref 70–105)
POTASSIUM SERPL-SCNC: 2.9 MMOL/L (ref 3.5–5.1)
SODIUM SERPL-SCNC: 138 MMOL/L (ref 134–144)

## 2020-06-08 PROCEDURE — 36415 COLL VENOUS BLD VENIPUNCTURE: CPT | Mod: ZL | Performed by: FAMILY MEDICINE

## 2020-06-08 PROCEDURE — G0463 HOSPITAL OUTPT CLINIC VISIT: HCPCS

## 2020-06-08 PROCEDURE — 83735 ASSAY OF MAGNESIUM: CPT | Mod: ZL | Performed by: FAMILY MEDICINE

## 2020-06-08 PROCEDURE — 80048 BASIC METABOLIC PNL TOTAL CA: CPT | Mod: ZL | Performed by: FAMILY MEDICINE

## 2020-06-08 PROCEDURE — 99214 OFFICE O/P EST MOD 30 MIN: CPT | Performed by: FAMILY MEDICINE

## 2020-06-08 RX ORDER — IBUPROFEN 800 MG/1
800 TABLET, FILM COATED ORAL EVERY 8 HOURS PRN
Qty: 200 TABLET | Refills: 3 | Status: SHIPPED | OUTPATIENT
Start: 2020-06-08 | End: 2021-06-17

## 2020-06-08 RX ORDER — HYDROCHLOROTHIAZIDE 12.5 MG/1
25 TABLET ORAL DAILY
Qty: 90 TABLET | Refills: 3 | Status: SHIPPED | OUTPATIENT
Start: 2020-06-08 | End: 2021-01-04

## 2020-06-08 RX ORDER — AMLODIPINE BESYLATE 10 MG/1
10 TABLET ORAL DAILY
Qty: 90 TABLET | Refills: 3 | Status: SHIPPED | OUTPATIENT
Start: 2020-06-08 | End: 2021-06-17

## 2020-06-08 RX ORDER — HYDROCODONE BITARTRATE AND ACETAMINOPHEN 5; 325 MG/1; MG/1
TABLET ORAL
Qty: 60 TABLET | Refills: 0 | Status: SHIPPED | OUTPATIENT
Start: 2020-06-08 | End: 2021-01-04

## 2020-06-08 ASSESSMENT — PAIN SCALES - GENERAL: PAINLEVEL: NO PAIN (0)

## 2020-06-08 ASSESSMENT — PATIENT HEALTH QUESTIONNAIRE - PHQ9: SUM OF ALL RESPONSES TO PHQ QUESTIONS 1-9: 0

## 2020-06-08 NOTE — LETTER
June 9, 2020      Amanda Gomez  [address]   [facility] [address]        Dear ,    We are writing to inform you of your test results.    As you can see your potassium was low on your recent lab.  I have sent an order for potassium chloride to take 1 twice a day.  I also ordered a magnesium and I may be contacting you about a low magnesium.  Frequently this is goes along with potassium.  Otherwise we should follow-up with a potassium level in about 3 months.    Resulted Orders   Basic Metabolic Panel   Result Value Ref Range    Sodium 138 134 - 144 mmol/L    Potassium 2.9 (L) 3.5 - 5.1 mmol/L    Chloride 101 98 - 107 mmol/L    Carbon Dioxide 26 21 - 31 mmol/L    Anion Gap 11 3 - 14 mmol/L    Glucose 115 (H) 70 - 105 mg/dL    Urea Nitrogen 20 7 - 25 mg/dL    Creatinine 0.87 0.60 - 1.20 mg/dL    GFR Estimate Not Calculated >60 mL/min/[1.73_m2]    GFR Estimate If Black Not Calculated >60 mL/min/[1.73_m2]    Calcium 9.3 8.6 - 10.3 mg/dL       If you have any questions or concerns, please call the clinic at the number listed above.       Sincerely,        Ryder Stack MD

## 2020-06-08 NOTE — LETTER
June 9, 2020      Amanda Gomez     [facility] [address]        Dear MsErikaPatricia,    We are writing to inform you of your test results.    I did receive the report of your magnesium and it did come back normal.  I would recommend potassium as we discussed in your previous letter.  And you should follow-up in 2 to 3 months.        If you have any questions or concerns, please call the clinic at the number listed above.       Sincerely,        Ryder Stack MD

## 2020-06-08 NOTE — NURSING NOTE
Patient here for medication refills. Medication Reconciliation: complete.    Rimma Ann LPN  6/8/2020 4:05 PM

## 2020-06-09 DIAGNOSIS — E87.6 HYPOKALEMIA: Primary | ICD-10-CM

## 2020-06-09 LAB — MAGNESIUM SERPL-MCNC: 1.9 MG/DL (ref 1.9–2.7)

## 2020-06-09 RX ORDER — POTASSIUM CHLORIDE 1.5 G/1.58G
20 POWDER, FOR SOLUTION ORAL 2 TIMES DAILY
Qty: 90 TABLET | Refills: 3 | Status: SHIPPED | OUTPATIENT
Start: 2020-06-09 | End: 2020-06-10

## 2020-06-09 NOTE — PROGRESS NOTES
Medication refill  SUBJECTIVE:   Amanda Gomez is a 64 year old female who presents to clinic today for the following health issues: Medication refill    Patient is arriving here requesting a refill of her hydrocodone Advil and Norvasc.  She has a history of hypertension.  Review of the chart shows she has not had a recent BMP.  She continues to use hydrocodone.  States that she uses it when the ibuprofen does not seem to be helping.  Uses it very rarely.  Patient reports that she needs about 50-60 every 4 to 6 months.        Patient Active Problem List    Diagnosis Date Noted     Piriformis syndrome, right 04/04/2020     Priority: Medium     Sacroiliac joint pain 04/04/2020     Priority: Medium     Babesiosis 09/04/2019     Priority: Medium     History of dysplastic nevus 12/30/2015     Priority: Medium     Overview:   Mid-upper back 2014, mild       Recurrent cold sores 05/27/2015     Priority: Medium     Dysplastic nevus 10/06/2014     Priority: Medium     Pain in joint of left shoulder 09/23/2014     Priority: Medium     Overview:   IMO Update       Chronic pain syndrome 06/12/2014     Priority: Medium     Overview:   [facility] Agreement for Opioid Treatment.        Distal radius fracture, right 03/08/2014     Priority: Medium     Osteopenia 03/08/2014     Priority: Medium     Essential hypertension 08/02/2011     Priority: Medium     Positive GABE (antinuclear antibody) 03/27/2008     Priority: Medium     Overview:   2008: Borderline positive dsDNA, SSB / La, and RNP. ? Significance, on Humira.       Psoriatic arthritis (H) 01/08/2008     Priority: Medium     Overview:   Onset ~ 2007; Raptiva (no benefit); MTX trial (2009), Humira ([id number]), Enbrel 4/2016       Psoriasis with pustules 10/28/2007     Priority: Medium     Pain in joint, pelvic region and thigh 12/20/2006     Priority: Medium     Overview:   IMO Update 10/11       Moderate recurrent major depression (H) 10/17/2003     Priority: Medium      Overview:   with mixed anxiety       Tobacco use disorder 07/09/2003     Priority: Medium     History reviewed. No pertinent past medical history.   History reviewed. No pertinent surgical history.  Current Outpatient Medications   Medication Sig Dispense Refill     acetaminophen (TYLENOL) 500 MG tablet Take 500 mg by mouth every 6 hours as needed for mild pain       acyclovir (ZOVIRAX) 5 % external ointment Apply topically every 3 hours as needed 30 g 3     amLODIPine (NORVASC) 10 MG tablet Take 1 tablet (10 mg) by mouth daily 90 tablet 3     aspirin 81 MG EC tablet Take 81 mg by mouth daily       calcipotriene (DOVONOX) 0.005 % external ointment Apply topically 2 times daily as needed       calcium carbonate (OS-MORENA) 500 MG tablet Take 1 tablet by mouth daily       etanercept (ENBREL SURECLICK) 50 MG/ML autoinjector Inject 50 mg Subcutaneous once a week        hydrochlorothiazide (HYDRODIURIL) 12.5 MG tablet Take 2 tablets (25 mg) by mouth daily 90 tablet 3     HYDROcodone-acetaminophen (NORCO) 5-325 MG tablet take one tablet every 8 hours as needed for pain 60 tablet 0     ibuprofen (ADVIL/MOTRIN) 800 MG tablet Take 1 tablet (800 mg) by mouth every 8 hours as needed for moderate pain 200 tablet 3     lidocaine (LIDODERM) 5 % patch Place 1 patch onto the skin daily as needed for moderate pain To prevent lidocaine toxicity, patient should be patch free for 12 hrs daily.       magnesium 250 MG tablet Take 2 tablets by mouth daily       melatonin 5 MG CAPS Take 1 capsule by mouth At Bedtime       Milk Thistle-Dand-Fennel-Licor (MILK THISTLE XTRA) CAPS capsule Take 1 capsule by mouth daily       mometasone (ELOCON) 0.1 % external cream Apply topically daily as needed       mupirocin (BACTROBAN) 2 % external ointment Apply topically 3 times daily as needed       ranitidine (ZANTAC) 300 MG tablet Take 300 mg by mouth every evening as needed for heartburn       valACYclovir (VALTREX) 500 MG tablet Take 500 mg by mouth 2  times daily as needed       vitamin D3 (CHOLECALCIFEROL) 2000 units (50 mcg) tablet Take 4 tablets by mouth daily       Allergies   Allergen Reactions     Cephalosporins Hives     Clindamycin Hives     Lisinopril Cough     Penicillins Hives     Varenicline        Review of Systems     OBJECTIVE:     /58   Pulse 76   Temp 97.9  F (36.6  C)   Resp 20   Wt 65.7 kg (144 lb 12.8 oz)   LMP  (LMP Unknown)   BMI 23.37 kg/m    Body mass index is 23.37 kg/m .  Physical Exam  Constitutional:       Appearance: Normal appearance.   HENT:      Head: Normocephalic.      Mouth/Throat:      Mouth: Mucous membranes are moist.   Cardiovascular:      Rate and Rhythm: Normal rate and regular rhythm.   Pulmonary:      Effort: Pulmonary effort is normal.      Breath sounds: Normal breath sounds.   Musculoskeletal: Normal range of motion.      Comments: Multiple joints especially hands swollen   Skin:     General: Skin is warm.   Neurological:      Mental Status: She is alert.         Diagnostic Test Results:  Results for orders placed or performed in visit on 06/08/20   Basic Metabolic Panel     Status: Abnormal   Result Value Ref Range    Sodium 138 134 - 144 mmol/L    Potassium 2.9 (L) 3.5 - 5.1 mmol/L    Chloride 101 98 - 107 mmol/L    Carbon Dioxide 26 21 - 31 mmol/L    Anion Gap 11 3 - 14 mmol/L    Glucose 115 (H) 70 - 105 mg/dL    Urea Nitrogen 20 7 - 25 mg/dL    Creatinine 0.87 0.60 - 1.20 mg/dL    GFR Estimate Not Calculated >60 mL/min/[1.73_m2]    GFR Estimate If Black Not Calculated >60 mL/min/[1.73_m2]    Calcium 9.3 8.6 - 10.3 mg/dL       ASSESSMENT/PLAN:         1. Essential hypertension  Currently under good control  - amLODIPine (NORVASC) 10 MG tablet; Take 1 tablet (10 mg) by mouth daily  Dispense: 90 tablet; Refill: 3  - hydrochlorothiazide (HYDRODIURIL) 12.5 MG tablet; Take 2 tablets (25 mg) by mouth daily  Dispense: 90 tablet; Refill: 3  - Basic Metabolic Panel; Future  - Basic Metabolic Panel    2.  Psoriatic arthritis (H)  Medications refilled  - ibuprofen (ADVIL/MOTRIN) 800 MG tablet; Take 1 tablet (800 mg) by mouth every 8 hours as needed for moderate pain  Dispense: 200 tablet; Refill: 3    3. Chronic pain syndrome    - HYDROcodone-acetaminophen (NORCO) 5-325 MG tablet; take one tablet every 8 hours as needed for pain  Dispense: 60 tablet; Refill: 0 follow-up for refill    Hypokalemia.  Will check a magnesium.  Start the patient on potassium.      Ryder Stack MD  [facility]

## 2020-06-10 RX ORDER — POTASSIUM CHLORIDE 1500 MG/1
TABLET, EXTENDED RELEASE ORAL
Qty: 180 TABLET | Refills: 1 | Status: SHIPPED | OUTPATIENT
Start: 2020-06-10

## 2020-06-10 NOTE — TELEPHONE ENCOUNTER
[facility] sent Rx request for the following:    PATIENT IS REQUESTING 90-DAY SUPPLY     potassium chloride (KLOR-CON) 20 MEQ packet  90 tablet  3  6/9/2020   --    Sig - Route: Take 20 mEq by mouth 2 times daily - Oral      [id number] - [place], [place] - [address]      New prescription sent to [facility] for #180 and 1 additional refill, per above order and G refill protocol. Sharlene Cheng RN .............. 6/10/2020  3:17 PM

## 2020-08-24 ENCOUNTER — APPOINTMENT (OUTPATIENT)
Dept: LAB | Facility: OTHER | Age: 65
End: 2020-08-24
Attending: INTERNAL MEDICINE
Payer: COMMERCIAL

## 2020-08-24 ENCOUNTER — MEDICAL CORRESPONDENCE (OUTPATIENT)
Dept: HEALTH INFORMATION MANAGEMENT | Facility: OTHER | Age: 65
End: 2020-08-24

## 2020-08-24 ENCOUNTER — RESULTS ONLY (OUTPATIENT)
Dept: LAB | Age: 65
End: 2020-08-24

## 2020-08-25 LAB
SARS-COV-2 RNA SPEC QL NAA+PROBE: NOT DETECTED
SPECIMEN SOURCE: NORMAL

## 2020-10-21 ENCOUNTER — OFFICE VISIT (OUTPATIENT)
Dept: FAMILY MEDICINE | Facility: OTHER | Age: 65
End: 2020-10-21
Attending: FAMILY MEDICINE
Payer: COMMERCIAL

## 2020-10-21 VITALS
RESPIRATION RATE: 18 BRPM | OXYGEN SATURATION: 97 % | TEMPERATURE: 97.9 F | WEIGHT: 151.2 LBS | SYSTOLIC BLOOD PRESSURE: 132 MMHG | HEART RATE: 92 BPM | DIASTOLIC BLOOD PRESSURE: 70 MMHG | BODY MASS INDEX: 24.4 KG/M2

## 2020-10-21 DIAGNOSIS — N39.0 ACUTE UTI: ICD-10-CM

## 2020-10-21 DIAGNOSIS — R35.0 URINARY FREQUENCY: Primary | ICD-10-CM

## 2020-10-21 LAB
ALBUMIN UR-MCNC: 10 MG/DL
APPEARANCE UR: ABNORMAL
BACTERIA #/AREA URNS HPF: ABNORMAL /HPF
BILIRUB UR QL STRIP: NEGATIVE
COLOR UR AUTO: YELLOW
GLUCOSE UR STRIP-MCNC: NEGATIVE MG/DL
HGB UR QL STRIP: ABNORMAL
KETONES UR STRIP-MCNC: NEGATIVE MG/DL
LEUKOCYTE ESTERASE UR QL STRIP: ABNORMAL
NITRATE UR QL: NEGATIVE
PH UR STRIP: 6.5 PH (ref 5–7)
RBC #/AREA URNS AUTO: 14 /HPF (ref 0–2)
SOURCE: ABNORMAL
SP GR UR STRIP: 1.02 (ref 1–1.03)
UROBILINOGEN UR STRIP-MCNC: NORMAL MG/DL (ref 0–2)
WBC #/AREA URNS AUTO: >182 /HPF (ref 0–5)

## 2020-10-21 PROCEDURE — 99213 OFFICE O/P EST LOW 20 MIN: CPT | Performed by: FAMILY MEDICINE

## 2020-10-21 PROCEDURE — G0463 HOSPITAL OUTPT CLINIC VISIT: HCPCS | Performed by: FAMILY MEDICINE

## 2020-10-21 PROCEDURE — 81001 URINALYSIS AUTO W/SCOPE: CPT | Mod: ZL | Performed by: FAMILY MEDICINE

## 2020-10-21 PROCEDURE — 87088 URINE BACTERIA CULTURE: CPT | Mod: ZL | Performed by: FAMILY MEDICINE

## 2020-10-21 PROCEDURE — 87086 URINE CULTURE/COLONY COUNT: CPT | Mod: ZL | Performed by: FAMILY MEDICINE

## 2020-10-21 RX ORDER — SULFAMETHOXAZOLE/TRIMETHOPRIM 800-160 MG
1 TABLET ORAL 2 TIMES DAILY
Qty: 10 TABLET | Refills: 0 | Status: SHIPPED | OUTPATIENT
Start: 2020-10-21 | End: 2020-10-26

## 2020-10-21 NOTE — NURSING NOTE
Pt presents to clinic today for urinary frequency, and order that started last night.      Medication Reconciliation: complete  Heather Gomez LPN

## 2020-10-21 NOTE — PROGRESS NOTES
Nursing Notes:   Heather Gmoez LPN  10/21/2020  3:38 PM  Sign at exiting of workspace  Pt presents to clinic today for urinary frequency, and order that started last night.      Medication Reconciliation: complete  Heather Gomez LPN       SUBJECTIVE:  65 year old female presents for dysuria, hematuria, and odor starting last night.  She's treated similar symptoms 4 times since July with Azo. Each time urinary symptoms resolved, but only temporarily. No antibiotics recently.   Most recently she had dysuria, took Azo, felt better, then symptoms returned a few days later so she decided to be seen.  No fever. No nausea. Does have some bilateral flank pain.  On Enbrel for psoriasis      REVIEW OF SYSTEMS:    Pertinent items are noted in HPI.    Current Outpatient Medications   Medication Sig Dispense Refill     acetaminophen (TYLENOL) 500 MG tablet Take 500 mg by mouth every 6 hours as needed for mild pain       acyclovir (ZOVIRAX) 5 % external ointment Apply topically every 3 hours as needed 30 g 3     amLODIPine (NORVASC) 10 MG tablet Take 1 tablet (10 mg) by mouth daily 90 tablet 3     aspirin 81 MG EC tablet Take 81 mg by mouth daily       calcipotriene (DOVONOX) 0.005 % external ointment Apply topically 2 times daily as needed       calcium carbonate (OS-MORENA) 500 MG tablet Take 1 tablet by mouth daily       etanercept (ENBREL SURECLICK) 50 MG/ML autoinjector Inject 50 mg Subcutaneous once a week        hydrochlorothiazide (HYDRODIURIL) 12.5 MG tablet Take 2 tablets (25 mg) by mouth daily 90 tablet 3     HYDROcodone-acetaminophen (NORCO) 5-325 MG tablet take one tablet every 8 hours as needed for pain 60 tablet 0     ibuprofen (ADVIL/MOTRIN) 800 MG tablet Take 1 tablet (800 mg) by mouth every 8 hours as needed for moderate pain 200 tablet 3     lidocaine (LIDODERM) 5 % patch Place 1 patch onto the skin daily as needed for moderate pain To prevent lidocaine toxicity, patient should be patch free for 12 hrs daily.        magnesium 250 MG tablet Take 2 tablets by mouth daily       melatonin 5 MG CAPS Take 1 capsule by mouth At Bedtime       Milk Thistle-Dand-Fennel-Licor (MILK THISTLE XTRA) CAPS capsule Take 1 capsule by mouth daily       mometasone (ELOCON) 0.1 % external cream Apply topically daily as needed       mupirocin (BACTROBAN) 2 % external ointment Apply topically 3 times daily as needed       potassium chloride ER (KLOR-CON M) 20 MEQ CR tablet TAKE 1 PACKET WITH DRINK OF CHOICE TWICE DAILY 180 tablet 1     ranitidine (ZANTAC) 300 MG tablet Take 300 mg by mouth every evening as needed for heartburn       valACYclovir (VALTREX) 500 MG tablet Take 500 mg by mouth 2 times daily as needed       vitamin D3 (CHOLECALCIFEROL) 2000 units (50 mcg) tablet Take 4 tablets by mouth daily       Allergies   Allergen Reactions     Cephalosporins Hives     Clindamycin Hives     Lisinopril Cough     Penicillins Hives     Varenicline        OBJECTIVE:  /70   Pulse 92   Temp 97.9  F (36.6  C) (Temporal)   Resp 18   Wt 68.6 kg (151 lb 3.2 oz)   LMP  (LMP Unknown)   SpO2 97%   BMI 24.40 kg/m      EXAM:  General Appearance: Alert. No acute distress  Psychiatric: Normal affect and mentation  Musculoskeletal: bilateral mild CVA tenderness    ASSESSMENT/PLAN:    ICD-10-CM    1. Urinary frequency  R35.0 UA reflex to Microscopic     UA consistent with UTI. Allergic to penicillin and cephalosporins. Nitrofurantoin will not treat potential early pyelo with flank pain symptoms. Bactrim DS BID x 5 days. Urine sent for culture.    Follow-up as needed     Isaak Valles MD    This document was prepared using a combination of typing and voice generated software.  While every attempt was made for accuracy, spelling and grammatical errors may exist.

## 2020-10-23 LAB
BACTERIA SPEC CULT: ABNORMAL
SPECIMEN SOURCE: ABNORMAL

## 2020-12-20 ENCOUNTER — HEALTH MAINTENANCE LETTER (OUTPATIENT)
Age: 65
End: 2020-12-20

## 2020-12-31 ENCOUNTER — OFFICE VISIT (OUTPATIENT)
Dept: FAMILY MEDICINE | Facility: OTHER | Age: 65
End: 2020-12-31
Attending: FAMILY MEDICINE
Payer: COMMERCIAL

## 2020-12-31 VITALS
OXYGEN SATURATION: 98 % | SYSTOLIC BLOOD PRESSURE: 124 MMHG | BODY MASS INDEX: 25.06 KG/M2 | TEMPERATURE: 96.5 F | WEIGHT: 150.4 LBS | DIASTOLIC BLOOD PRESSURE: 70 MMHG | RESPIRATION RATE: 16 BRPM | HEART RATE: 82 BPM | HEIGHT: 65 IN

## 2020-12-31 DIAGNOSIS — N89.8 VAGINAL DISCHARGE: Primary | ICD-10-CM

## 2020-12-31 LAB
ALBUMIN UR-MCNC: NEGATIVE MG/DL
APPEARANCE UR: CLEAR
BILIRUB UR QL STRIP: NEGATIVE
COLOR UR AUTO: NORMAL
GLUCOSE UR STRIP-MCNC: NEGATIVE MG/DL
HGB UR QL STRIP: NEGATIVE
KETONES UR STRIP-MCNC: NEGATIVE MG/DL
LEUKOCYTE ESTERASE UR QL STRIP: NEGATIVE
NITRATE UR QL: NEGATIVE
PH UR STRIP: 6.5 PH (ref 5–7)
SOURCE: NORMAL
SP GR UR STRIP: 1.02 (ref 1–1.03)
SPECIMEN SOURCE: NORMAL
UROBILINOGEN UR STRIP-MCNC: NORMAL MG/DL (ref 0–2)
WET PREP SPEC: NORMAL

## 2020-12-31 PROCEDURE — 87210 SMEAR WET MOUNT SALINE/INK: CPT | Mod: ZL | Performed by: FAMILY MEDICINE

## 2020-12-31 PROCEDURE — 81003 URINALYSIS AUTO W/O SCOPE: CPT | Mod: ZL | Performed by: FAMILY MEDICINE

## 2020-12-31 PROCEDURE — 99213 OFFICE O/P EST LOW 20 MIN: CPT | Performed by: FAMILY MEDICINE

## 2020-12-31 PROCEDURE — G0463 HOSPITAL OUTPT CLINIC VISIT: HCPCS

## 2020-12-31 RX ORDER — METRONIDAZOLE 500 MG/1
2000 TABLET ORAL ONCE
Qty: 4 TABLET | Refills: 0 | Status: SHIPPED | OUTPATIENT
Start: 2020-12-31 | End: 2020-12-31

## 2020-12-31 ASSESSMENT — PATIENT HEALTH QUESTIONNAIRE - PHQ9: SUM OF ALL RESPONSES TO PHQ QUESTIONS 1-9: 0

## 2020-12-31 ASSESSMENT — ENCOUNTER SYMPTOMS
CHILLS: 0
VOMITING: 0
FEVER: 0
DYSURIA: 0
NAUSEA: 0
FREQUENCY: 0

## 2020-12-31 ASSESSMENT — PAIN SCALES - GENERAL: PAINLEVEL: MILD PAIN (2)

## 2020-12-31 ASSESSMENT — MIFFLIN-ST. JEOR: SCORE: 1238.09

## 2020-12-31 NOTE — LETTER
My Depression Action Plan  Name: Amanda Gomez   Date of Birth 1955  Date: 12/31/2020    My doctor: Ryder Stack   My clinic: [facility]  [address]  [place] [id number] [phone]          GREEN    ZONE   Good Control    What it looks like:     Things are going generally well. You have normal ups and downs. You may even feel depressed from time to time, but bad moods usually last less than a day.   What you need to do:  1. Continue to care for yourself (see self care plan)  2. Check your depression survival kit and update it as needed  3. Follow your physician s recommendations including any medication.  4. Do not stop taking medication unless you consult with your physician first.           YELLOW         ZONE Getting Worse    What it looks like:     Depression is starting to interfere with your life.     It may be hard to get out of bed; you may be starting to isolate yourself from others.    Symptoms of depression are starting to last most all day and this has happened for several days.     You may have suicidal thoughts but they are not constant.   What you need to do:     1. Call your care team. Your response to treatment will improve if you keep your care team informed of your progress. Yellow periods are signs an adjustment may need to be made.     2. Continue your self-care.  Just get dressed and ready for the day.  Don't give yourself time to talk yourself out of it.    3. Talk to someone in your support network.    4. Open up your Depression Self-Care Plan/Wellness Kit.           RED    ZONE Medical Alert - Get Help    What it looks like:     Depression is seriously interfering with your life.     You may experience these or other symptoms: You can t get out of bed most days, can t work or engage in other necessary activities, you have trouble taking care of basic hygiene, or basic responsibilities, thoughts of suicide or death that will not  go away, self-injurious behavior.     What you need to do:  1. Call your care team and request a same-day appointment. If they are not available (weekends or after hours) call your local crisis line, emergency room or 911.            Depression Self-Care Plan / Wellness Kit    Self-Care for Depression  Here s the deal. Your body and mind are really not as separate as most people think.  What you do and think affects how you feel and how you feel influences what you do and think. This means if you do things that people who feel good do, it will help you feel better.  Sometimes this is all it takes.  There is also a place for medication and therapy depending on how severe your depression is, so be sure to consult with your medical provider and/ or Behavioral Health Consultant if your symptoms are worsening or not improving.     In order to better manage my stress, I will:    Exercise  Get some form of exercise, every day. This will help reduce pain and release endorphins, the  feel good  chemicals in your brain. This is almost as good as taking antidepressants!  This is not the same as joining a gym and then never going! (they count on that by the way ) It can be as simple as just going for a walk or doing some gardening, anything that will get you moving.      Hygiene   Maintain good hygiene (get out of bed in the morning, make your bed, brush your teeth, take a shower, and get dressed like you were going to work, even if you are unemployed).  If your clothes don't fit try to get ones that do.    Diet  Strive to eat foods that are good for me, drink plenty of water, and avoid excessive sugar, caffeine, alcohol, and other mood-altering substances.  Some foods that are helpful in depression are: complex carbohydrates, B vitamins, flaxseed, fish or fish oil, fresh fruits and vegetables.    Psychotherapy  Agree to participate in Individual Therapy (if recommended).    Medication  If prescribed medications, I agree to  take them.  Missing doses can result in serious side effects.  I understand that drinking alcohol, or other illicit drug use, may cause potential side effects.  I will not stop my medication abruptly without first discussing it with my provider.    Staying Connected With Others  Stay in touch with my friends, family members, and my primary care provider/team.    Use your imagination  Be creative.  We all have a creative side; it doesn t matter if it s oil painting, sand castles, or mud pies! This will also kick up the endorphins.    Witness Beauty  (AKA stop and smell the roses) Take a look outside, even in mid-winter. Notice colors, textures. Watch the squirrels and birds.     Service to others  Be of service to others.  There is always someone else in need.  By helping others we can  get out of ourselves  and remember the really important things.  This also provides opportunities for practicing all the other parts of the program.    Humor  Laugh and be silly!  Adjust your TV habits for less news and crime-drama and more comedy.    Control your stress  Try breathing deep, massage therapy, biofeedback, and meditation. Find time to relax each day.     Crisis Text Line  http://www.crisistextline.org    The Crisis Text Line serves anyone, in any type of crisis, providing access to free, 24/7 support and information via the medium people already use and trust:    Here's how it works:  1.  Text 324-888 from anywhere in the [place], anytime, about any type of crisis.  2.  A live, trained Crisis Counselor receives the text and responds quickly.  3.  The volunteer Crisis Counselor will help you move from a 'hot moment to a cool moment'.    My support system    Clinic Contact:  Phone number:    Contact 1:  Phone number:    Contact 2:  Phone number:    Taoist/:  Phone number:    Therapist:  Phone number:    Local crisis center:    Phone number:    Other community support:  Phone number:

## 2020-12-31 NOTE — NURSING NOTE
"Chief Complaint   Patient presents with     Vaginal Problem     cramping and discharge         Initial /70   Pulse 82   Temp 96.5  F (35.8  C) (Temporal)   Resp 16   Ht 1.651 m (5' 5\")   Wt 68.2 kg (150 lb 6.4 oz)   LMP  (LMP Unknown)   SpO2 98%   BMI 25.03 kg/m   Estimated body mass index is 25.03 kg/m  as calculated from the following:    Height as of this encounter: 1.651 m (5' 5\").    Weight as of this encounter: 68.2 kg (150 lb 6.4 oz).         Medication Reconciliation: Complete      Norma J. Gosselin, LPN   "

## 2020-12-31 NOTE — PROGRESS NOTES
SUBJECTIVE:   Amanda Gomez is a 65 year old female who presents to clinic today for the following health issues:    HPI  Vaginal Discharge:  She is concerned that she has developed a vaginal infection.  She has a history of trichomonal infection years ago and current symptoms feel similar.  She reports foul-smelling discharge which is yellow-green in color.  She has had some pelvic cramping and pressure.  She has a history of hysterectomy for heavy menstrual bleeding.  She is not currently sexually active.  Her last sexual encounter was four years ago.    History reviewed. No pertinent past medical history.   History reviewed. No pertinent surgical history.  Family History   Problem Relation Age of Onset     Rheumatoid Arthritis Other      Social History     Tobacco Use     Smoking status: Former Smoker     Packs/day: 1.00     Years: 35.00     Pack years: 35.00     Quit date: 3/4/2017     Years since quitting: 3.8     Smokeless tobacco: Never Used   Substance Use Topics     Alcohol use: Yes     Comment: occassionally     Current Outpatient Medications   Medication Sig Dispense Refill     acetaminophen (TYLENOL) 500 MG tablet Take 500 mg by mouth every 6 hours as needed for mild pain       acyclovir (ZOVIRAX) 5 % external ointment Apply topically every 3 hours as needed 30 g 3     amLODIPine (NORVASC) 10 MG tablet Take 1 tablet (10 mg) by mouth daily 90 tablet 3     aspirin 81 MG EC tablet Take 81 mg by mouth daily       calcipotriene (DOVONOX) 0.005 % external ointment Apply topically 2 times daily as needed       calcium carbonate (OS-MORENA) 500 MG tablet Take 1 tablet by mouth daily       etanercept (ENBREL SURECLICK) 50 MG/ML autoinjector Inject 50 mg Subcutaneous once a week        hydrochlorothiazide (HYDRODIURIL) 12.5 MG tablet Take 2 tablets (25 mg) by mouth daily 90 tablet 3     HYDROcodone-acetaminophen (NORCO) 5-325 MG tablet take one tablet every 8 hours as needed for pain 60 tablet 0     ibuprofen  Subjective   Patient ID: Bri is a 77 year old female.  Chief Complaint   Patient presents with   • Follow-up   • Breathing Problem       Bri is living at home with family who is here with adult child for follow up of her multiple medical problems as listed in the problem list.  In general she has been feeling fair.    HPI:    The patient presents today for a new patient visit but was not given adequate time for complete new patient visit.    After explanation in regards to the new patient visit the daughter who is in from [place] asked that we discuss patient's anxiety medication and potential for removing her from the anxiety medication.  The patient has been taking lorazepam for many years and is currently taking 1 mg every night.  When she skips taking this medication she has difficulty sleeping.  Most recently her internist had instructed her to take the medication every other night which she has been tolerating without any clear benzodiazepine withdrawal symptoms although she is having some sweating at nighttime.  The patient is also on low-dose escitalopram and has been on this for quite some time.  The patient's other medications include medicine for osteoporosis and hypertension.  The patient does see a cardiologist regularly and I have reviewed her previous clinical record.  Further evaluation is not undertaken today because of the short nature of today's visit.  Based on the plan below we will work on further assessment of the patient in future.    Review of Systems   All other systems reviewed and are negative.      Objective   Vitals: /76 (BP Location: [facility] [place], Patient Position: Sitting)   Pulse 66   Temp 97.9 °F (36.6 °C) (Tympanic)   Ht 5' 7\" (1.702 m)   Wt 77.6 kg (171 lb)   BMI 26.78 kg/m²   BSA 1.89 m²   Physical Exam   Constitutional: She is oriented to person, place, and time. She appears well-developed and well-nourished.   HENT:   Head: Normocephalic and atraumatic.   Eyes: EOM  "(ADVIL/MOTRIN) 800 MG tablet Take 1 tablet (800 mg) by mouth every 8 hours as needed for moderate pain 200 tablet 3     lidocaine (LIDODERM) 5 % patch Place 1 patch onto the skin daily as needed for moderate pain To prevent lidocaine toxicity, patient should be patch free for 12 hrs daily.       magnesium 250 MG tablet Take 2 tablets by mouth daily       melatonin 5 MG CAPS Take 1 capsule by mouth At Bedtime       Milk Thistle-Dand-Fennel-Licor (MILK THISTLE XTRA) CAPS capsule Take 1 capsule by mouth daily       mometasone (ELOCON) 0.1 % external cream Apply topically daily as needed       mupirocin (BACTROBAN) 2 % external ointment Apply topically 3 times daily as needed       potassium chloride ER (KLOR-CON M) 20 MEQ CR tablet TAKE 1 PACKET WITH DRINK OF CHOICE TWICE DAILY 180 tablet 1     ranitidine (ZANTAC) 300 MG tablet Take 300 mg by mouth every evening as needed for heartburn       valACYclovir (VALTREX) 500 MG tablet Take 500 mg by mouth 2 times daily as needed       vitamin D3 (CHOLECALCIFEROL) 2000 units (50 mcg) tablet Take 4 tablets by mouth daily       Allergies   Allergen Reactions     Cephalosporins Hives     Clindamycin Hives     Lisinopril Cough     Penicillins Hives     Varenicline      Review of Systems   Constitutional: Negative for chills and fever.   Gastrointestinal: Negative for nausea and vomiting.   Genitourinary: Positive for pelvic pain and vaginal discharge. Negative for dysuria, frequency, vaginal bleeding and vaginal pain.      OBJECTIVE:     /70   Pulse 82   Temp 96.5  F (35.8  C) (Temporal)   Resp 16   Ht 1.651 m (5' 5\")   Wt 68.2 kg (150 lb 6.4 oz)   LMP  (LMP Unknown)   SpO2 98%   BMI 25.03 kg/m    Body mass index is 25.03 kg/m .  Physical Exam  Constitutional:       General: She is not in acute distress.     Appearance: Normal appearance. She is not ill-appearing.   Genitourinary:     Exam position: Lithotomy position.      Labia:         Right: No rash, tenderness, " are normal. Pupils are equal, round, and reactive to light.   Neck: Normal range of motion. Neck supple.   Cardiovascular: Normal rate, regular rhythm and normal heart sounds.   Pulmonary/Chest: Effort normal and breath sounds normal.   Abdominal: Soft. Bowel sounds are normal.   Musculoskeletal: Normal range of motion.   Neurological: She is alert and oriented to person, place, and time.   Skin: Skin is warm and dry.     Neurological Exam  Mental Status  Alert.    Cranial Nerves  CN III, IV, VI: Extraocular movements intact bilaterally. Pupils equal round and reactive to light bilaterally.      Assessment   ED Diagnosis   1. Cervical radiculopathy     2. Essential hypertension     3. MCI (mild cognitive impairment) with memory loss     4. Depression, recurrent (CMS/HCC)     5. 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 Sleep disturbances         PLAN:    The patient will decrease the Lorazepam to 0.5 mg or 1/2 tablet every at bedtime.  They will call me if the patient is developing any difficulties with this reduction in dosing at which point I would try the patient on a different medication.  The possibility of using trazodone at at bedtime for sleep would be advisable.  The patient's medications are updated today and she has discontinued the tramadol and is no longer taking omeprazole regularly.  I have suggested that she use Zantac or Pepcid if necessary for reflux symptoms.  The patient will call in 3-4 weeks and we will further reduce the lorazepam at that time.  The patient will call for any refills on medications as needed.  After further reduction in the lorazepam we will try to wean the patient off of this medication and then see her back in consultation.  The patient will need further close follow-up but because of the distance from my office I am not sure that this is reasonable and the daughter who brought the patient today is from [place] and it is not  clear that her brother will be able to take the patient this distance.  I have also requested the patient increase the escitalopram to 10 mg daily.    Schedule follow up: as needed    Total time spent is more than 30 minutes, with more than 50% of the time spent in coordination of care, counseling, review of records and discussion of plan of care with the patient and family.    Alexis Bedolla, DO  2/15/2019       lesion or injury.         Left: No rash, tenderness, lesion or injury.       Vagina: Vaginal discharge present. No erythema, bleeding or lesions.   Neurological:      Mental Status: She is alert.   Psychiatric:         Mood and Affect: Mood normal.       Diagnostic Test Results:  Results for orders placed or performed in visit on 12/31/20   UA reflex to Microscopic and Culture     Status: None    Specimen: Midstream Urine   Result Value Ref Range    Color Urine Light Yellow     Appearance Urine Clear     Glucose Urine Negative NEG^Negative mg/dL    Bilirubin Urine Negative NEG^Negative    Ketones Urine Negative NEG^Negative mg/dL    Specific Gravity Urine 1.016 1.003 - 1.035    Blood Urine Negative NEG^Negative    pH Urine 6.5 5.0 - 7.0 pH    Protein Albumin Urine Negative NEG^Negative mg/dL    Urobilinogen mg/dL Normal 0.0 - 2.0 mg/dL    Nitrite Urine Negative NEG^Negative    Leukocyte Esterase Urine Negative NEG^Negative    Source Midstream Urine    Wet Prep, Genital     Status: None    Specimen: Vagina   Result Value Ref Range    Specimen Description Vagina     Wet Prep No Trichomonas seen     Wet Prep No clue cells seen     Wet Prep No yeast seen      ASSESSMENT/PLAN:     1. Vaginal discharge  Urinalysis within normal limits and wet prep negative for yeast, clue cells, and trichomonas.  However, given her symptoms and yellow vaginal discharge on physical exam, will treat her for bacterial vaginosis with one time dose of Metronidazole.  Follow-up if symptoms worsening or failing to improve.  - UA reflex to Microscopic and Culture  - Wet Prep, Genital  - metroNIDAZOLE (FLAGYL) 500 MG tablet; Take 4 tablets (2,000 mg) by mouth once for 1 dose  Dispense: 4 tablet; Refill: 0      DO [facility]

## 2021-01-04 ENCOUNTER — OFFICE VISIT (OUTPATIENT)
Dept: FAMILY MEDICINE | Facility: OTHER | Age: 66
End: 2021-01-04
Attending: FAMILY MEDICINE
Payer: COMMERCIAL

## 2021-01-04 VITALS
WEIGHT: 155.6 LBS | SYSTOLIC BLOOD PRESSURE: 138 MMHG | BODY MASS INDEX: 25.89 KG/M2 | DIASTOLIC BLOOD PRESSURE: 62 MMHG | RESPIRATION RATE: 20 BRPM | TEMPERATURE: 97.9 F | OXYGEN SATURATION: 98 % | HEART RATE: 85 BPM

## 2021-01-04 DIAGNOSIS — G89.4 CHRONIC PAIN SYNDROME: ICD-10-CM

## 2021-01-04 DIAGNOSIS — L40.50 PSORIATIC ARTHRITIS (H): Primary | ICD-10-CM

## 2021-01-04 DIAGNOSIS — I10 ESSENTIAL HYPERTENSION: ICD-10-CM

## 2021-01-04 PROCEDURE — 99214 OFFICE O/P EST MOD 30 MIN: CPT | Performed by: FAMILY MEDICINE

## 2021-01-04 PROCEDURE — G0463 HOSPITAL OUTPT CLINIC VISIT: HCPCS

## 2021-01-04 RX ORDER — HYDROCODONE BITARTRATE AND ACETAMINOPHEN 5; 325 MG/1; MG/1
TABLET ORAL
Qty: 30 TABLET | Refills: 0 | Status: SHIPPED | OUTPATIENT
Start: 2021-01-04 | End: 2021-08-17

## 2021-01-04 RX ORDER — PREDNISONE 20 MG/1
TABLET ORAL
Qty: 20 TABLET | Refills: 0 | Status: SHIPPED | OUTPATIENT
Start: 2021-01-04 | End: 2021-08-17

## 2021-01-04 RX ORDER — HYDROCHLOROTHIAZIDE 25 MG/1
25 TABLET ORAL DAILY
Qty: 90 TABLET | Refills: 3 | Status: SHIPPED | OUTPATIENT
Start: 2021-01-04 | End: 2021-12-21

## 2021-01-04 RX ORDER — METRONIDAZOLE 500 MG/1
TABLET ORAL
COMMUNITY
Start: 2021-01-02 | End: 2021-08-17

## 2021-01-04 ASSESSMENT — PAIN SCALES - GENERAL: PAINLEVEL: MODERATE PAIN (4)

## 2021-01-04 NOTE — NURSING NOTE
Patient here for left foot pain for the past 3 weeks. She would like her hydrochlorothiazide tablet changed from 2 little to 1 bigger. She drops the pills.Medication Reconciliation: complete.    Rimma Ann LPN  1/4/2021 3:37 PM

## 2021-01-05 PROBLEM — M53.3 SACROILIAC JOINT PAIN: Status: RESOLVED | Noted: 2020-04-04 | Resolved: 2021-01-05

## 2021-01-05 NOTE — PROGRESS NOTES
SUBJECTIVE:   Amanda Gomez is a 65 year old female who presents to clinic today for the following health issues: Left foot pain.    Patient arrives here for left foot pain.  Also requesting refill of her Norco.  She rates her foot pain as a 4 out of 10.  Is been going on for 3 weeks.  She has a history of psoriasis and psoriatic arthritis with i occasional exacerbation.  Is been going on for 13 years.  She has had problems in the past with shoulder pain knee pain.  Majority of time attributed to psoriatic arthritis.  Currently she rates the pain as a 4 out of 10.  She has been putting ice on it without any relief.  Symptoms been going on for about 3 weeks.  She is also been using braces.  In the past she has been put on a Medrol Dosepak with good relief.  Chart reviewed she does see rheumatology.  Reports her next appointment is in April.   also reviewed and is appropriate.  Last prescription for hydrocodone was back in June.  Patient also requests a refill of her hydrochlorothiazide last BMP was in June  Prescriptions: 2    Total Private Pay: 0    Fill Date ID   Written Drug Qty Days Prescriber Rx # Pharmacy Refill   Daily Dose* Pymt Type      06/08/2020  1   06/08/2020  Hydrocodone-Acetamin 5-325 MG  60.00  20 Mi [facility]   [id number]   [id number]   0  15.00 [place]   03/04/2020  1   03/04/2020  Hydrocodone-Acetamin 5-325 MG  60.00  20 Mi Lie   [id number]   [facility] ([id number]   [place]  15.[place]   *Per CDC guidance, the MME conversion factors prescribed or provided as part of the medication-assisted treatment for opioid use disorder should not be         Patient Active Problem List    Diagnosis Date Noted     Piriformis syndrome, right 04/04/2020     Priority: Medium     Babesiosis 09/04/2019     Priority: Medium     History of dysplastic nevus 12/30/2015     Priority: Medium     Overview:   Mid-upper back 2014, mild       Recurrent cold sores 05/27/2015     Priority: Medium     Osteopenia 03/08/2014      Priority: Medium     Essential hypertension 08/02/2011     Priority: Medium     Positive GABE (antinuclear antibody) 03/27/2008     Priority: Medium     Overview:   2008: Borderline positive dsDNA, SSB / La, and RNP. ? Significance, on Humira.       Psoriatic arthritis (H) 01/08/2008     Priority: Medium     Overview:   Onset ~ 2007; Raptiva (no benefit); MTX trial (2009), Humira ([id number]), Enbrel 4/2016       Psoriasis with pustules 10/28/2007     Priority: Medium     Moderate recurrent major depression (H) 10/17/2003     Priority: Medium     Overview:   with mixed anxiety       Tobacco use disorder 07/09/2003     Priority: Medium       Review of Systems     OBJECTIVE:     /62   Pulse 85   Temp 97.9  F (36.6  C)   Resp 20   Wt 70.6 kg (155 lb 9.6 oz)   LMP  (LMP Unknown)   SpO2 98%   BMI 25.89 kg/m    Body mass index is 25.89 kg/m .  Physical Exam  Constitutional:       Appearance: Normal appearance.   HENT:      Head: Normocephalic.   Musculoskeletal:         General: Tenderness present. No swelling or deformity.      Comments: Patient has quite a bit of tenderness just distal to the lateral malleolus on the left side.  Decreased range of motion due to pain   Neurological:      Mental Status: She is alert.   Psychiatric:         Mood and Affect: Mood normal.         Thought Content: Thought content normal.         Diagnostic Test Results:  none     ASSESSMENT/PLAN:         1. Essential hypertension  Blood pressure currently under good control medication refilled  - hydrochlorothiazide (HYDRODIURIL) 25 MG tablet; Take 1 tablet (25 mg) by mouth daily  Dispense: 90 tablet; Refill: 3    2. Psoriatic arthritis (H)  Refill hydrocodone.  Medrol Dosepak  - HYDROcodone-acetaminophen (NORCO) 5-325 MG tablet; take one tablet every 8 hours as needed for pain  Dispense: 30 tablet; Refill: 0  - predniSONE (DELTASONE) 20 MG tablet; Take 3 tabs by mouth daily x 3 days, then 2 tabs daily x 3 days, then 1 tab  daily x 3 days, then 1/2 tab daily x 3 days.  Dispense: 20 tablet; Refill: 0    3. Chronic pain syndrome  Refill  - HYDROcodone-acetaminophen (NORCO) 5-325 MG tablet; take one tablet every 8 hours as needed for pain  Dispense: 30 tablet; Refill: 0    Prescription monitoring program reviewed and appropriate.  Ryder Stack MD  [facility] AND [facility]

## 2021-06-16 DIAGNOSIS — L40.50 PSORIATIC ARTHRITIS (H): Primary | ICD-10-CM

## 2021-06-16 DIAGNOSIS — Z79.1 NSAID LONG-TERM USE: ICD-10-CM

## 2021-06-16 DIAGNOSIS — I10 ESSENTIAL HYPERTENSION: ICD-10-CM

## 2021-06-17 RX ORDER — AMLODIPINE BESYLATE 10 MG/1
TABLET ORAL
Qty: 90 TABLET | Refills: 1 | Status: SHIPPED | OUTPATIENT
Start: 2021-06-17 | End: 2021-12-21

## 2021-06-17 RX ORDER — IBUPROFEN 800 MG/1
TABLET, FILM COATED ORAL
Qty: 200 TABLET | Refills: 1 | Status: SHIPPED | OUTPATIENT
Start: 2021-06-17 | End: 2021-12-21

## 2021-06-17 NOTE — TELEPHONE ENCOUNTER
[facility] sent Rx request for the following:      Requested Prescriptions   Pending Prescriptions Disp Refills   ibuprofen (ADVIL/MOTRIN) 800 MG tablet [Pharmacy Med Name: IBUPROFEN TAB 800MG] 200 tablet 3    Sig: TAKE 1 TABLET EVERY 8 HOURSAS NEEDED FOR MODERATE PAIN   Last Prescription Date:   6/8/20  Last Fill Qty/Refills:         200, R-3  Routing refill request to provider for review/approval because:   NSAID Medications Failed - 6/17/2021  2:03 PM       Failed - Normal ALT on file in past 12 months       Failed - Normal AST on file in past 12 months       Failed - Normal CBC on file in past 12 months       Failed - Normal serum creatinine on file in past 12 months      amLODIPine (NORVASC) 10 MG tablet [Pharmacy Med Name: AMLODIPINE TAB 10MG] 90 tablet 3    Sig: TAKE 1 TABLET DAILY   Last Prescription Date:   6/8/20  Last Fill Qty/Refills:         90, R-3  Routing refill request to provider for review/approval because:   Calcium Channel Blockers Protocol  Failed - 6/17/2021  2:03 PM       Failed - Normal serum creatinine on file in past 12 months     Last Office Visit:              1/4/21  Future Office visit:           None    Unable to complete prescription refill per RN Medication Refill Policy. Sharlene Cheng RN .............. 6/17/2021  2:29 PM

## 2021-08-17 ENCOUNTER — OFFICE VISIT (OUTPATIENT)
Dept: FAMILY MEDICINE | Facility: OTHER | Age: 66
End: 2021-08-17
Attending: FAMILY MEDICINE
Payer: COMMERCIAL

## 2021-08-17 VITALS
BODY MASS INDEX: 26.53 KG/M2 | WEIGHT: 159.4 LBS | TEMPERATURE: 97.7 F | DIASTOLIC BLOOD PRESSURE: 64 MMHG | RESPIRATION RATE: 20 BRPM | SYSTOLIC BLOOD PRESSURE: 138 MMHG | HEART RATE: 75 BPM | OXYGEN SATURATION: 98 %

## 2021-08-17 DIAGNOSIS — M54.50 ACUTE MIDLINE LOW BACK PAIN WITHOUT SCIATICA: Primary | ICD-10-CM

## 2021-08-17 PROCEDURE — G0463 HOSPITAL OUTPT CLINIC VISIT: HCPCS

## 2021-08-17 PROCEDURE — 99213 OFFICE O/P EST LOW 20 MIN: CPT | Performed by: FAMILY MEDICINE

## 2021-08-17 RX ORDER — TRAMADOL HYDROCHLORIDE 50 MG/1
50 TABLET ORAL EVERY 6 HOURS PRN
Qty: 18 TABLET | Refills: 0 | Status: SHIPPED | OUTPATIENT
Start: 2021-08-17 | End: 2021-08-22

## 2021-08-17 ASSESSMENT — PATIENT HEALTH QUESTIONNAIRE - PHQ9: SUM OF ALL RESPONSES TO PHQ QUESTIONS 1-9: 0

## 2021-08-17 ASSESSMENT — PAIN SCALES - GENERAL: PAINLEVEL: MODERATE PAIN (5)

## 2021-08-17 NOTE — PROGRESS NOTES
SUBJECTIVE:   Amanda Gomez is a 66 year old female who presents to clinic today for the following health issues: Back pain    Patient arrives here for back pain.  She states it started about 10 days ago.  She cannot recall any specific trauma or injury.  Bending makes it worse.  She denies any changes in bowel or bladder habits.  No shooting pain down her legs.  She had something very similar about 8 months ago.  Requiring ER visit.  For treatment she is used ice heat exercise yoga ibuprofen Biofreeze and also other creams        Patient Active Problem List    Diagnosis Date Noted     Acute midline low back pain without sciatica 08/17/2021     Priority: Medium     Piriformis syndrome, right 04/04/2020     Priority: Medium     Babesiosis 09/04/2019     Priority: Medium     History of dysplastic nevus 12/30/2015     Priority: Medium     Overview:   Mid-upper back 2014, mild       Recurrent cold sores 05/27/2015     Priority: Medium     Osteopenia 03/08/2014     Priority: Medium     Essential hypertension 08/02/2011     Priority: Medium     Positive GABE (antinuclear antibody) 03/27/2008     Priority: Medium     Overview:   2008: Borderline positive dsDNA, SSB / La, and RNP. ? Significance, on Humira.       Psoriatic arthritis (H) 01/08/2008     Priority: Medium     Overview:   Onset ~ 2007; Raptiva (no benefit); MTX trial (2009), Humira ([id number]), Enbrel 4/2016       Psoriasis with pustules 10/28/2007     Priority: Medium     Moderate recurrent major depression (H) 10/17/2003     Priority: Medium     Overview:   with mixed anxiety       Tobacco use disorder 07/09/2003     Priority: Medium       Review of Systems     OBJECTIVE:     /64   Pulse 75   Temp 97.7  F (36.5  C)   Resp 20   Wt 72.3 kg (159 lb 6.4 oz)   LMP  (LMP Unknown)   SpO2 98%   BMI 26.53 kg/m    Body mass index is 26.53 kg/m .  Physical Exam  Constitutional:       Appearance: Normal appearance.      Comments: Patient appears to be in  mild discomfort   Neurological:      General: No focal deficit present.      Mental Status: She is alert.      Gait: Gait normal.      Deep Tendon Reflexes: Reflexes normal.      Comments: Able to stand on toes and heels without difficulty.   Psychiatric:         Mood and Affect: Mood normal.         Diagnostic Test Results:  none     ASSESSMENT/PLAN:         1. Acute midline low back pain without sciatica  Start follow-up if not improving  - traMADol (ULTRAM) 50 MG tablet; Take 1 tablet (50 mg) by mouth every 6 hours as needed for severe pain  Dispense: 18 tablet; Refill: 0      Ryder Stack MD  [facility]

## 2021-08-17 NOTE — NURSING NOTE
Patient here for low back pain for the past 10 days. Medication Reconciliation: complete.    Rimma Ann LPN  8/17/2021 1:33 PM

## 2021-09-24 ENCOUNTER — OFFICE VISIT (OUTPATIENT)
Dept: FAMILY MEDICINE | Facility: OTHER | Age: 66
End: 2021-09-24
Attending: PHYSICIAN ASSISTANT
Payer: COMMERCIAL

## 2021-09-24 VITALS
TEMPERATURE: 98.7 F | DIASTOLIC BLOOD PRESSURE: 78 MMHG | BODY MASS INDEX: 25.41 KG/M2 | SYSTOLIC BLOOD PRESSURE: 138 MMHG | WEIGHT: 152.7 LBS | OXYGEN SATURATION: 97 % | HEART RATE: 80 BPM | RESPIRATION RATE: 18 BRPM

## 2021-09-24 DIAGNOSIS — N39.9 URINARY PROBLEM IN FEMALE: ICD-10-CM

## 2021-09-24 DIAGNOSIS — G89.29 CHRONIC MIDLINE LOW BACK PAIN WITHOUT SCIATICA: ICD-10-CM

## 2021-09-24 DIAGNOSIS — M54.50 CHRONIC MIDLINE LOW BACK PAIN WITHOUT SCIATICA: ICD-10-CM

## 2021-09-24 DIAGNOSIS — M62.830 BACK MUSCLE SPASM: ICD-10-CM

## 2021-09-24 DIAGNOSIS — N30.00 ACUTE CYSTITIS WITHOUT HEMATURIA: Primary | ICD-10-CM

## 2021-09-24 LAB
ALBUMIN UR-MCNC: NEGATIVE MG/DL
APPEARANCE UR: CLEAR
BACTERIA #/AREA URNS HPF: ABNORMAL /HPF
BILIRUB UR QL STRIP: NEGATIVE
COLOR UR AUTO: ABNORMAL
GLUCOSE UR STRIP-MCNC: NEGATIVE MG/DL
HGB UR QL STRIP: NEGATIVE
KETONES UR STRIP-MCNC: NEGATIVE MG/DL
LEUKOCYTE ESTERASE UR QL STRIP: ABNORMAL
NITRATE UR QL: NEGATIVE
PH UR STRIP: 7 [PH] (ref 5–9)
RBC URINE: 3 /HPF
SP GR UR STRIP: 1.01 (ref 1–1.03)
UROBILINOGEN UR STRIP-MCNC: NORMAL MG/DL
WBC URINE: 101 /HPF

## 2021-09-24 PROCEDURE — 99214 OFFICE O/P EST MOD 30 MIN: CPT | Performed by: NURSE PRACTITIONER

## 2021-09-24 PROCEDURE — 81001 URINALYSIS AUTO W/SCOPE: CPT | Mod: ZL | Performed by: NURSE PRACTITIONER

## 2021-09-24 PROCEDURE — 87086 URINE CULTURE/COLONY COUNT: CPT | Mod: ZL | Performed by: NURSE PRACTITIONER

## 2021-09-24 PROCEDURE — G0463 HOSPITAL OUTPT CLINIC VISIT: HCPCS

## 2021-09-24 RX ORDER — METHOCARBAMOL 750 MG/1
750 TABLET, FILM COATED ORAL 4 TIMES DAILY PRN
Qty: 20 TABLET | Refills: 0 | Status: SHIPPED | OUTPATIENT
Start: 2021-09-24 | End: 2021-09-30

## 2021-09-24 RX ORDER — PREDNISONE 20 MG/1
TABLET ORAL
Qty: 20 TABLET | Refills: 0 | Status: SHIPPED | OUTPATIENT
Start: 2021-09-24 | End: 2021-12-21

## 2021-09-24 RX ORDER — NITROFURANTOIN 25; 75 MG/1; MG/1
100 CAPSULE ORAL 2 TIMES DAILY
Qty: 10 CAPSULE | Refills: 0 | Status: SHIPPED | OUTPATIENT
Start: 2021-09-24 | End: 2021-09-29

## 2021-09-24 ASSESSMENT — PAIN SCALES - GENERAL: PAINLEVEL: MODERATE PAIN (5)

## 2021-09-24 NOTE — NURSING NOTE
"Chief Complaint   Patient presents with     Back Pain     She has been having back spasms since august. She has been having back pain since. She has been seen for for this and it has not gotten better. She has been having oder in her urine, frequency, and bringing with urination.     Initial LMP  (LMP Unknown)  Estimated body mass index is 26.53 kg/m  as calculated from the following:    Height as of 12/31/20: 1.651 m (5' 5\").    Weight as of 8/17/21: 72.3 kg (159 lb 6.4 oz).     FOOD SECURITY SCREENING QUESTIONS  Hunger Vital Signs:  Within the past 12 months we worried whether our food would run out before we got money to buy more. Never  Within the past 12 months the food we bought just didn't last and we didn't have money to get more. Never      Medication Reconciliation: Complete      Robbi Morales LPN   "

## 2021-09-24 NOTE — PROGRESS NOTES
ASSESSMENT/PLAN:    I have reviewed the nursing notes.  I have reviewed the findings, diagnosis, plan and need for follow up with the patient.    1. Urinary problem in female  - UA reflex to Microscopic and Culture  - Urine Culture  Large leukocytes, 101 wbcs, and few bacteria present in urine today. Will treat for UTI. Culture pending.     2. Acute cystitis without hematuria  - nitroFURantoin macrocrystal-monohydrate (MACROBID) 100 MG capsule; Take 1 capsule (100 mg) by mouth 2 times daily for 5 days  Dispense: 10 capsule; Refill: 0  -increase water intake   -will follow urine culture results and treat as indicated and discussed this process with patient     3. Chronic midline low back pain without sciatica  - predniSONE (DELTASONE) 20 MG tablet; Take 3 tabs by mouth daily x 3 days, then 2 tabs daily x 3 days, then 1 tab daily x 3 days, then 1/2 tab daily x 3 days.  Dispense: 20 tablet; Refill: 0    4. Back muscle spasm  - methocarbamol (ROBAXIN) 750 MG tablet; Take 1 tablet (750 mg) by mouth 4 times daily as needed for muscle spasms  Dispense: 20 tablet; Refill: 0    May use over-the-counter Tylenol or ibuprofen PRN    Discussed warning signs/symptoms indicative of need to f/u    Follow up if symptoms persist or worsen or concerns    I explained my diagnostic considerations and recommendations to the patient, who voiced understanding and agreement with the treatment plan. All questions were answered. We discussed potential side effects of any prescribed or recommended therapies, as well as expectations for response to treatments.    Laney Alexander NP  9/24/2021  6:56 PM    HPI:  Amanda Gomez is a 66 year old female who presents to [facility] today for:    Concerns of back spasms and back pain since August. Has been seen with no improvement. Also reports odorous urine, frequency, and burning with urination and some leaking of urine/incontinence which is not her norm. She recently moved and was lifting a lot  of furniture and packing since last Saturday, which did not help her back pain. Noticed the urination symptoms for past 3 days or so. Tried cranberry pills with no relief. Drinks a lot of coffee. Last UTI was about 1 year ago.     She tells me that was seen in mid August for her back pain, was given tramadol which helped the pain temporarily but it was not nearly as helpful for her as prednisone and muscle relaxants have been. She does not like the way the tramadol makes her feel, thus not taking.     Denies fevers, but has chills. No hematuria.     No involuntary loss of bowel or bladder.     Otherwise negative ROS.     History reviewed. No pertinent past medical history.  History reviewed. No pertinent surgical history.  Social History     Tobacco Use     Smoking status: Former Smoker     Packs/day: 1.00     Years: 35.00     Pack years: 35.00     Quit date: 3/4/2017     Years since quittin.5     Smokeless tobacco: Never Used   Substance Use Topics     Alcohol use: Yes     Comment: occassionally     Current Outpatient Medications   Medication Sig Dispense Refill     acetaminophen (TYLENOL) 500 MG tablet Take 500 mg by mouth every 6 hours as needed for mild pain       acyclovir (ZOVIRAX) 5 % external ointment Apply topically every 3 hours as needed 30 g 3     amLODIPine (NORVASC) 10 MG tablet TAKE 1 TABLET DAILY 90 tablet 1     aspirin 81 MG EC tablet Take 81 mg by mouth daily       calcipotriene (DOVONOX) 0.005 % external ointment Apply topically 2 times daily as needed       calcium carbonate (OS-MORENA) 500 MG tablet Take 1 tablet by mouth daily       etanercept (ENBREL SURECLICK) 50 MG/ML autoinjector Inject 50 mg Subcutaneous once a week        hydrochlorothiazide (HYDRODIURIL) 25 MG tablet Take 1 tablet (25 mg) by mouth daily 90 tablet 3     ibuprofen (ADVIL/MOTRIN) 800 MG tablet TAKE 1 TABLET EVERY 8 HOURSAS NEEDED FOR MODERATE PAIN 200 tablet 1     lidocaine (LIDODERM) 5 % patch Place 1 patch onto the skin  daily as needed for moderate pain To prevent lidocaine toxicity, patient should be patch free for 12 hrs daily.       magnesium 250 MG tablet Take 2 tablets by mouth daily       melatonin 5 MG CAPS Take 1 capsule by mouth At Bedtime       Milk Thistle-Dand-Fennel-Licor (MILK THISTLE XTRA) CAPS capsule Take 1 capsule by mouth daily       mometasone (ELOCON) 0.1 % external cream Apply topically daily as needed       mupirocin (BACTROBAN) 2 % external ointment Apply topically 3 times daily as needed       potassium chloride ER (KLOR-CON M) 20 MEQ CR tablet TAKE 1 PACKET WITH DRINK OF CHOICE TWICE DAILY 180 tablet 1     ranitidine (ZANTAC) 300 MG tablet Take 300 mg by mouth every evening as needed for heartburn       valACYclovir (VALTREX) 500 MG tablet Take 500 mg by mouth 2 times daily as needed       vitamin D3 (CHOLECALCIFEROL) 2000 units (50 mcg) tablet Take 4 tablets by mouth daily       Allergies   Allergen Reactions     Cephalosporins Hives     Clindamycin Hives     Lisinopril Cough     Penicillins Hives     Varenicline      Past medical history, past surgical history, current medications and allergies reviewed and accurate to the best of my knowledge.      ROS:  Refer to HPI    /78   Pulse 80   Temp 98.7  F (37.1  C) (Tympanic)   Resp 18   Wt 69.3 kg (152 lb 11.2 oz)   LMP  (LMP Unknown)   SpO2 97%   BMI 25.41 kg/m      EXAM:  General Appearance: Well appearing 66 year old female, appropriate appearance for age. No acute distress  Respiratory: normal chest wall and respirations.  Normal effort.  Clear to auscultation bilaterally, no wheezing, crackles or rhonchi.  No increased work of breathing.  No cough appreciated.  Cardiac: RRR with no murmurs  Abdomen: soft, nontender, no rigidity, no rebound tenderness or guarding, normal bowel sounds present  :  + suprapubic tenderness to palpation.  No CVA tenderness to palpation.    Musculoskeletal:  Equal movement of bilateral upper extremities.  Equal  movement of bilateral lower extremities.  Normal gait.  Low back: no step offs of lumbar spine but para lumbar muscles bilaterally are tender and pain is reproducible to physical examination. Range of motion is decreased due to pain.     Dermatological: no rashes noted of exposed skin  Psychological: normal affect, alert, oriented, and pleasant.

## 2021-09-25 NOTE — PATIENT INSTRUCTIONS
Prednisone taper for flare of chronic back pain.     Muscle relaxant for back spasms.     Urine result is pending. Will treat with antibiotic if indicated based on urinalysis.     Present to ER if you develop any loss of bowel or bladder, fever/chills, intolerable back pain.

## 2021-09-27 ENCOUNTER — TELEPHONE (OUTPATIENT)
Dept: FAMILY MEDICINE | Facility: OTHER | Age: 66
End: 2021-09-27

## 2021-09-27 LAB — BACTERIA UR CULT: ABNORMAL

## 2021-09-27 NOTE — TELEPHONE ENCOUNTER
Patient is calling for her lab results.  Please call      Koki Sarmiento on 9/27/2021 at 1:16 PM

## 2021-09-29 ENCOUNTER — TELEPHONE (OUTPATIENT)
Dept: FAMILY MEDICINE | Facility: OTHER | Age: 66
End: 2021-09-29

## 2021-09-29 DIAGNOSIS — M62.830 BACK MUSCLE SPASM: ICD-10-CM

## 2021-09-29 NOTE — TELEPHONE ENCOUNTER
ELYSIA-pt still having issues from [facility]-I recc appt. She wants to speak with you 1st. Please call to advise. Thank you.  Hodan Ramirez

## 2021-09-30 RX ORDER — METHOCARBAMOL 750 MG/1
750 TABLET, FILM COATED ORAL 4 TIMES DAILY PRN
Qty: 20 TABLET | Refills: 0 | Status: SHIPPED | OUTPATIENT
Start: 2021-09-30 | End: 2022-04-05

## 2021-09-30 NOTE — TELEPHONE ENCOUNTER
Spoke with patient she was in to the [facility] with a bladder infection. She is having muscle spasms 05/10 can not sleep. She has been icing, heat, biofreeze stretching and walking to help. She would like a refill on the robaxin. Rimma Ann LPN .......................9/30/2021  2:18 PM

## 2021-10-03 ENCOUNTER — HEALTH MAINTENANCE LETTER (OUTPATIENT)
Age: 66
End: 2021-10-03

## 2021-10-05 ENCOUNTER — OFFICE VISIT (OUTPATIENT)
Dept: FAMILY MEDICINE | Facility: OTHER | Age: 66
End: 2021-10-05
Attending: FAMILY MEDICINE
Payer: COMMERCIAL

## 2021-10-05 VITALS
SYSTOLIC BLOOD PRESSURE: 130 MMHG | RESPIRATION RATE: 20 BRPM | TEMPERATURE: 96.8 F | DIASTOLIC BLOOD PRESSURE: 60 MMHG | OXYGEN SATURATION: 96 % | WEIGHT: 157.8 LBS | BODY MASS INDEX: 26.26 KG/M2 | HEART RATE: 81 BPM

## 2021-10-05 DIAGNOSIS — M54.50 ACUTE MIDLINE LOW BACK PAIN WITHOUT SCIATICA: Primary | ICD-10-CM

## 2021-10-05 PROCEDURE — G0463 HOSPITAL OUTPT CLINIC VISIT: HCPCS

## 2021-10-05 PROCEDURE — 99214 OFFICE O/P EST MOD 30 MIN: CPT | Performed by: FAMILY MEDICINE

## 2021-10-05 RX ORDER — HYDROCODONE BITARTRATE AND ACETAMINOPHEN 5; 325 MG/1; MG/1
1 TABLET ORAL EVERY 6 HOURS PRN
Qty: 18 TABLET | Refills: 0 | Status: SHIPPED | OUTPATIENT
Start: 2021-10-05 | End: 2021-10-10

## 2021-10-05 ASSESSMENT — PAIN SCALES - GENERAL: PAINLEVEL: SEVERE PAIN (6)

## 2021-10-05 NOTE — NURSING NOTE
Patient here for back pain she was to the clinic in [place] and was started on Tizanadine. Medication Reconciliation: complete.    Rimma Ann LPN  10/5/2021 3:54 PM

## 2021-10-06 NOTE — PROGRESS NOTES
SUBJECTIVE:   Amanda Gomez is a 66 year old female who presents to clinic today for the following health issues: Ongoing back pain    Patient arrives here for ongoing lower back pain.  She was recently seen at an outside facility and does bring with her reports.  She has been using ice heat.  Was recently started on tizanidine.  Reports no improvement.  She did have a repeat UA done which was negative.  No pain involving the legs.  No weakness in the legs.  No changes in bowel or bladder habits        Patient Active Problem List    Diagnosis Date Noted     Acute midline low back pain without sciatica 08/17/2021     Priority: Medium     Piriformis syndrome, right 04/04/2020     Priority: Medium     Babesiosis 09/04/2019     Priority: Medium     History of dysplastic nevus 12/30/2015     Priority: Medium     Overview:   Mid-upper back 2014, mild       Recurrent cold sores 05/27/2015     Priority: Medium     Osteopenia 03/08/2014     Priority: Medium     Essential hypertension 08/02/2011     Priority: Medium     Positive GABE (antinuclear antibody) 03/27/2008     Priority: Medium     Overview:   2008: Borderline positive dsDNA, SSB / [place], and RNP. ? Significance, on Humira.       Psoriatic arthritis (H) 01/08/2008     Priority: Medium     Overview:   Onset ~ 2007; Raptiva (no benefit); MTX trial (2009), Humira ([id number]), Enbrel 4/2016       Psoriasis with pustules 10/28/2007     Priority: Medium     Moderate recurrent major depression (H) 10/17/2003     Priority: Medium     Overview:   with mixed anxiety       Tobacco use disorder 07/09/2003     Priority: Medium     No past medical history on file.   No past surgical history on file.    Review of Systems     OBJECTIVE:     /60   Pulse 81   Temp 96.8  F (36  C)   Resp 20   Wt 71.6 kg (157 lb 12.8 oz)   LMP  (LMP Unknown)   SpO2 96%   BMI 26.26 kg/m    Body mass index is 26.26 kg/m .  Physical Exam  Constitutional:       Comments: Patient does appear  to be in moderate discomfort   HENT:      Head: Normocephalic and atraumatic.   Neurological:      Mental Status: She is alert.      Gait: Gait normal.      Deep Tendon Reflexes: Reflexes normal.      Comments: Able to stand on toes and heels without difficulty.  Squats normally         Diagnostic Test Results:  none     ASSESSMENT/PLAN:         (M54.50) Acute midline low back pain without sciatica  (primary encounter diagnosis)  Comment: Ongoing back pain.  No relief with the Ultram which she was recently given.  Short course of hydrocodone.  Physical therapy.  Plan: HYDROcodone-acetaminophen (NORCO) 5-325 MG         tablet, Physical Therapy Referral      Follow-up if not improving        Ryder Stack MD  [facility]

## 2021-10-11 ENCOUNTER — ALLIED HEALTH/NURSE VISIT (OUTPATIENT)
Dept: FAMILY MEDICINE | Facility: OTHER | Age: 66
End: 2021-10-11
Attending: FAMILY MEDICINE
Payer: COMMERCIAL

## 2021-10-11 DIAGNOSIS — R09.81 CONGESTION OF PARANASAL SINUS: Primary | ICD-10-CM

## 2021-10-11 PROCEDURE — C9803 HOPD COVID-19 SPEC COLLECT: HCPCS

## 2021-10-11 PROCEDURE — U0005 INFEC AGEN DETEC AMPLI PROBE: HCPCS | Mod: ZL

## 2021-10-12 LAB — SARS-COV-2 RNA RESP QL NAA+PROBE: NEGATIVE

## 2021-10-19 ENCOUNTER — OFFICE VISIT (OUTPATIENT)
Dept: FAMILY MEDICINE | Facility: OTHER | Age: 66
End: 2021-10-19
Attending: FAMILY MEDICINE
Payer: COMMERCIAL

## 2021-10-19 VITALS
BODY MASS INDEX: 26.29 KG/M2 | HEART RATE: 66 BPM | RESPIRATION RATE: 16 BRPM | DIASTOLIC BLOOD PRESSURE: 80 MMHG | TEMPERATURE: 98.3 F | SYSTOLIC BLOOD PRESSURE: 132 MMHG | WEIGHT: 158 LBS | OXYGEN SATURATION: 97 %

## 2021-10-19 DIAGNOSIS — J01.00 ACUTE NON-RECURRENT MAXILLARY SINUSITIS: Primary | ICD-10-CM

## 2021-10-19 PROCEDURE — 99213 OFFICE O/P EST LOW 20 MIN: CPT | Performed by: FAMILY MEDICINE

## 2021-10-19 PROCEDURE — G0463 HOSPITAL OUTPT CLINIC VISIT: HCPCS

## 2021-10-19 RX ORDER — DOXYCYCLINE 100 MG/1
200 CAPSULE ORAL DAILY
Qty: 14 CAPSULE | Refills: 0 | Status: SHIPPED | OUTPATIENT
Start: 2021-10-19 | End: 2021-10-26

## 2021-10-19 ASSESSMENT — PAIN SCALES - GENERAL: PAINLEVEL: MILD PAIN (3)

## 2021-10-19 NOTE — PROGRESS NOTES
Nursing Notes:   Laney Hernández LPN  10/19/2021 11:47 AM  Sign at exiting of workspace  Chief Complaint   Patient presents with     Sinus Problem     Has been feeling ill since September. States she thinks she has a sinus infection. Neg COVID 10/11. Has facial pressure and pain. Green phlegm.     Medication Reconciliation: complete    Laney Hernández LPN       SUBJECTIVE:  HPI: Amanda Gomez is a 66 year old female here for sinus pressure and pain.  Patient reports symptoms started 5 days ago and have been gradually worsening.  She notes green phlegm and nasal discharge since Sunday.  She also notes a fever of 101.1.  She reports pain in her sinuses is rated 3 out of 10 and she feels pain in her teeth as well.  She reports noting that her sinuses feel puffy.  She has used ibuprofen 800 mg as needed, cough syrup, decongestants and [place] pot twice daily.  Patient does have an allergy to penicillins.  She was tested for Covid and was negative on 10/11.    Recently treated for UTI on 9/24 with urinary symptoms resolved.    Allergies:  Allergies   Allergen Reactions     Cephalosporins Hives     Clindamycin Hives     Lisinopril Cough     Penicillins Hives     Varenicline      ROS:  Constitutional, HEENT, cardiovascular, pulmonary, GI and  systems are negative, except as otherwise noted.    Past medical, surgical, and family history reviewed and updated as appropriate in the chart.  Relevant social history listed in HPI.    OBJECTIVE:  /80 (BP Location: Right arm, Patient Position: Sitting, Cuff Size: Adult Regular)   Pulse 66   Temp 98.3  F (36.8  C) (Tympanic)   Resp 16   Wt 71.7 kg (158 lb)   LMP  (LMP Unknown)   SpO2 97%   Breastfeeding No   BMI 26.29 kg/m      EXAM:  Constitutional: No acute distress. Well-groomed, well-hydrated and well-nourished.  Appears stated age.  Head: Normocephalic, atraumatic.  Frontal and maxillary sinuses are tender to palpation bilaterally.  Eyes: anicteric,  PERRL  Ears: Normal TMs bilaterally. Normal auditory canals and external ears.   OroPharynx: Moist mucous membranes. Dental hygiene adequate. Normal buccal mucosa. Normal pharynx.  Neck: Supple, with no masses or nodes. No lymphadenopathy.  Respiratory: Non-labored respirations. Clear to auscultation bilaterally.  No wheezing, rhonchi, or rales.  Cardiovascular: Regular rate.  No murmur.  No lower extremity edema.  Abdominal: Soft, nontender, non-distended.  Skin: Warm, dry, intact.   Musculoskeletal: Moves arms and legs equally and normally  Neurologic: A+Ox3.    ASSESSMENT/PLAN:   1. Acute non-recurrent maxillary sinusitis  Comment: History and physical are concerning for bacterial sinus infection.  Pen allergy.  Plan:  -7-day course of doxycycline prescribed  -Also encouraged her to continue daily [place] pot use in addition to ibuprofen for decreased inflammation.  She can use a decongestant as needed as well.  -Handout for sinusitis given and discussed.  - doxycycline monohydrate (MONODOX) 100 MG capsule; Take 2 capsules (200 mg) by mouth daily for 7 days  Dispense: 14 capsule; Refill: 0  -Side effect profile discussed.  -Return to clinic in 1 week if no improvement or if symptoms worsen.      Meredith Garcia MD  [facility]

## 2021-10-19 NOTE — NURSING NOTE
Chief Complaint   Patient presents with     Sinus Problem     Has been feeling ill since September. States she thinks she has a sinus infection. Neg COVID 10/11. Has facial pressure and pain. Green phlegm.     Medication Reconciliation: complete    aLney Hernández LPN

## 2021-10-23 PROBLEM — J01.00 ACUTE NON-RECURRENT MAXILLARY SINUSITIS: Status: ACTIVE | Noted: 2021-10-23

## 2021-10-24 ENCOUNTER — OFFICE VISIT (OUTPATIENT)
Dept: FAMILY MEDICINE | Facility: OTHER | Age: 66
End: 2021-10-24
Attending: NURSE PRACTITIONER
Payer: COMMERCIAL

## 2021-10-24 ENCOUNTER — HOSPITAL ENCOUNTER (OUTPATIENT)
Dept: GENERAL RADIOLOGY | Facility: OTHER | Age: 66
End: 2021-10-24
Attending: PHYSICIAN ASSISTANT
Payer: COMMERCIAL

## 2021-10-24 VITALS
TEMPERATURE: 98.2 F | RESPIRATION RATE: 17 BRPM | BODY MASS INDEX: 25.36 KG/M2 | HEART RATE: 77 BPM | DIASTOLIC BLOOD PRESSURE: 82 MMHG | SYSTOLIC BLOOD PRESSURE: 144 MMHG | OXYGEN SATURATION: 98 % | WEIGHT: 152.4 LBS

## 2021-10-24 DIAGNOSIS — S63.502A WRIST SPRAIN, LEFT, INITIAL ENCOUNTER: ICD-10-CM

## 2021-10-24 DIAGNOSIS — M25.532 LEFT WRIST PAIN: Primary | ICD-10-CM

## 2021-10-24 PROCEDURE — 99213 OFFICE O/P EST LOW 20 MIN: CPT | Performed by: PHYSICIAN ASSISTANT

## 2021-10-24 PROCEDURE — 73110 X-RAY EXAM OF WRIST: CPT | Mod: LT

## 2021-10-24 PROCEDURE — G0463 HOSPITAL OUTPT CLINIC VISIT: HCPCS

## 2021-10-24 ASSESSMENT — PAIN SCALES - GENERAL: PAINLEVEL: MILD PAIN (3)

## 2021-10-24 NOTE — NURSING NOTE
"Chief Complaint   Patient presents with     Injury     left wrist        FOOD SECURITY SCREENING QUESTIONS  Hunger Vital Signs:  Within the past 12 months we worried whether our food would run out before we got money to buy more. Never  Within the past 12 months the food we bought just didn't last and we didn't have money to get more. Never  Yvette Claire LPN 10/24/2021 1:25 PM      Initial BP (!) 144/82 (BP Location: Right arm, Patient Position: Sitting, Cuff Size: Adult Regular)   Pulse 77   Temp 98.2  F (36.8  C) (Tympanic)   Resp 17   Wt 69.1 kg (152 lb 6.4 oz)   LMP  (LMP Unknown)   SpO2 98%   BMI 25.36 kg/m   Estimated body mass index is 25.36 kg/m  as calculated from the following:    Height as of 12/31/20: 1.651 m (5' 5\").    Weight as of this encounter: 69.1 kg (152 lb 6.4 oz).  Medication Reconciliation: complete    Yvette Claire LPN  "

## 2021-10-24 NOTE — PATIENT INSTRUCTIONS
Please refer to your AVS for follow up and pain/symptoms management recommendations (I.e.: medications, helpful conservative treatment modalities, appropriate follow up if need to a specialist or family practice, etc.). Please return to urgent care if your symptoms change or worsen.     Discharge instructions:  -If you were prescribed a medication(s), please take this as prescribed/directed  -Monitor your symptoms, if changing/worsening, return to UC/ER or PCP for follow up    For pain control - we recommend alternating Tylenol and Ibuprofen if you are able to take these medications. Alternate every 4 hours as needed. I.e.: Ibuprofen at 8am, Tylenol 12pm, Ibuprofen 4pm    -Daily maximum of Tylenol is 4000mg (recommend staying under 3000mg)   -Daily maximum of Ibuprofen is 1200mg (take no more than six 200mg pills a day)    - Alternate heat and ice. Ice is better for bone and heat for muscles/tendons.   - Wrist brace given during visit - we will bill to insurance for this

## 2021-10-24 NOTE — PROGRESS NOTES
ASSESSMENT/PLAN:    I have reviewed the nursing notes.  I have reviewed the findings, diagnosis, plan and need for follow up with the patient.    1. Wrist sprain, left, initial encounter  - Wrist/Arm/Hand Supplies Order for DME - ONLY FOR DME  - Vital signs stable.  Physical exam consistent with sprain of left wrist.  XR: negative for fracture per my read, formal read in process. Discussed that sprains are typically self-limiting and will heal in 4 to 8 weeks duration of time.  Recommend follow up in 7-10 days for repeat x-rays if symptoms persist. Recommend alternating Tylenol and ibuprofen every 4-6 hours if able, do not exceed daily limits as reviewed on AVS (4000 mg of Tylenol daily, 1200 mg of ibuprofen daily), alternate heat and ice, gentle range of motion as tolerated.  If an orthopedic referral was placed patient understands that they will contact the patient directly to schedule this visit.  Patient runs into any difficulties/setbacks during recovery they should follow-up with her PCP or orthopedics for reevaluation. Patient is in agreement and understanding of the above treatment plan. All questions and concerns were addressed and answered to patient's satisfaction. AVS reviewed with patient.     2. Left wrist pain  - XR Wrist Left G/E 3 Views  - See #1     Discussed warning signs/symptoms indicative of need to f/u    Follow up if symptoms persist or worsen or concerns    I explained my diagnostic considerations and recommendations to the patient, who voiced understanding and agreement with the treatment plan. All questions were answered. We discussed potential side effects of any prescribed or recommended therapies, as well as expectations for response to treatments.    Jemima Leon PA-C  10/24/2021  1:28 PM    HPI:    Amanda Gomez is a 66 year old female  who presents to [facility] today for concerns of left wrist pain which onset today, she was backing up to not step on grandson and fell over  in the process.     RHD/LHD: RHD  Pain: 3/10  Quality of pain: tightness and sharpness  Location: left wrist  Palliative: rest  Provocative: motion  Numbness, tingling, burning: none  Mechanical symptoms (locking, popping, catching): crunching  Bruising/edema/erythema: edema  Treatments tried: Ibuprofen at 0800 for athritis  Prior falls, injuries or trauma: yes, broke 5 years prior  Additional symptoms to report: none    Allergies: see epic    PCP: MD Seda    No past medical history on file.  No past surgical history on file.  Social History     Tobacco Use     Smoking status: Former Smoker     Packs/day: 1.00     Years: 35.00     Pack years: 35.00     Quit date: 3/4/2017     Years since quittin.6     Smokeless tobacco: Never Used   Substance Use Topics     Alcohol use: Yes     Comment: occassionally     Current Outpatient Medications   Medication Sig Dispense Refill     acetaminophen (TYLENOL) 500 MG tablet Take 500 mg by mouth every 6 hours as needed for mild pain       acyclovir (ZOVIRAX) 5 % external ointment Apply topically every 3 hours as needed 30 g 3     amLODIPine (NORVASC) 10 MG tablet TAKE 1 TABLET DAILY 90 tablet 1     aspirin 81 MG EC tablet Take 81 mg by mouth daily       calcipotriene (DOVONOX) 0.005 % external ointment Apply topically 2 times daily as needed       calcium carbonate (OS-MORENA) 500 MG tablet Take 1 tablet by mouth daily       doxycycline monohydrate (MONODOX) 100 MG capsule Take 2 capsules (200 mg) by mouth daily for 7 days 14 capsule 0     etanercept (ENBREL SURECLICK) 50 MG/ML autoinjector Inject 50 mg Subcutaneous once a week        hydrochlorothiazide (HYDRODIURIL) 25 MG tablet Take 1 tablet (25 mg) by mouth daily 90 tablet 3     ibuprofen (ADVIL/MOTRIN) 800 MG tablet TAKE 1 TABLET EVERY 8 HOURSAS NEEDED FOR MODERATE PAIN 200 tablet 1     lidocaine (LIDODERM) 5 % patch Place 1 patch onto the skin daily as needed for moderate pain To prevent lidocaine toxicity, patient should  be patch free for 12 hrs daily.       magnesium 250 MG tablet Take 2 tablets by mouth daily        melatonin 5 MG CAPS Take 1 capsule by mouth At Bedtime        methocarbamol (ROBAXIN) 750 MG tablet Take 1 tablet (750 mg) by mouth 4 times daily as needed for muscle spasms 20 tablet 0     Milk Thistle-Dand-Fennel-Licor (MILK THISTLE XTRA) CAPS capsule Take 1 capsule by mouth daily       mometasone (ELOCON) 0.1 % external cream Apply topically daily as needed       mupirocin (BACTROBAN) 2 % external ointment Apply topically 3 times daily as needed       potassium chloride ER (KLOR-CON M) 20 MEQ CR tablet TAKE 1 PACKET WITH DRINK OF CHOICE TWICE DAILY 180 tablet 1     predniSONE (DELTASONE) 20 MG tablet Take 3 tabs by mouth daily x 3 days, then 2 tabs daily x 3 days, then 1 tab daily x 3 days, then 1/2 tab daily x 3 days. 20 tablet 0     ranitidine (ZANTAC) 300 MG tablet Take 300 mg by mouth every evening as needed for heartburn       tiZANidine (ZANAFLEX) 4 MG tablet Take 4 mg by mouth every 8 hours as needed       valACYclovir (VALTREX) 500 MG tablet Take 500 mg by mouth 2 times daily as needed       vitamin D3 (CHOLECALCIFEROL) 2000 units (50 mcg) tablet Take 4 tablets by mouth daily       Allergies   Allergen Reactions     Cephalosporins Hives     Clindamycin Hives     Lisinopril Cough     Penicillins Hives     Varenicline      Past medical history, past surgical history, current medications and allergies reviewed and accurate to the best of my knowledge.      ROS:  Refer to HPI    BP (!) 144/82 (BP Location: Right arm, Patient Position: Sitting, Cuff Size: Adult Regular)   Pulse 77   Temp 98.2  F (36.8  C) (Tympanic)   Resp 17   Wt 69.1 kg (152 lb 6.4 oz)   LMP  (LMP Unknown)   SpO2 98%   BMI 25.36 kg/m      EXAM:  General Appearance: Well appearing 66-year old female, appropriate appearance for age. No acute distress  Neck: supple without adenopathy  Respiratory: normal chest wall and respirations.  Normal  effort.  Clear to auscultation bilaterally, no wheezing, crackles or rhonchi.  No increased work of breathing.  No cough appreciated.  Cardiac: RRR with no murmurs  MSK:  Right WRIST PHYSICAL EXAMINATION:  General Examination of the wrist: no signs of bony deformity. Skin is intact with no signs of current or previous trauma to the region.     Palpation: Tenderness to palpation: diffusely around the wrist. No TTP any of the MCP, DIP or PIP joints.    ROM: flexion, extension, radial deviation, ulnar deviation, pronation, supination - minimal due to pain    Special Testing:   Tinel: negative   Phalen: negative   Finkelstein: negative     Muscular atrophy: No    Neurovascular status: Sensation is intact in the radial, ulnar and median nerve distributions supplying the distal hand/fingers. Isaias test is normal. Distal pulses 2+.    Dermatological: no rashes noted of exposed skin  Psychological: normal affect, alert, oriented, and pleasant.     Labs:  None     Xray:  Osteopenia noted on XR per my read, no fracture or dislocation, formal read in process.

## 2021-12-17 ENCOUNTER — TELEPHONE (OUTPATIENT)
Dept: FAMILY MEDICINE | Facility: OTHER | Age: 66
End: 2021-12-17
Payer: COMMERCIAL

## 2021-12-17 DIAGNOSIS — I10 ESSENTIAL HYPERTENSION: ICD-10-CM

## 2021-12-17 DIAGNOSIS — L40.50 PSORIATIC ARTHRITIS (H): ICD-10-CM

## 2021-12-17 NOTE — TELEPHONE ENCOUNTER
Reason for call: Medication or medication refill    Name of medication requested: all    Are you out of the medication? no    What pharmacy do you use?     Preferred method for responding to this message: Telephone Call    Phone number patient can be reached at: Home number on file [phone] (home)    If we cannot reach you directly, may we leave a detailed response at the number you provided? Yes

## 2021-12-20 RX ORDER — IBUPROFEN 800 MG/1
TABLET, FILM COATED ORAL
Qty: 60 TABLET | Refills: 0 | Status: CANCELLED | OUTPATIENT
Start: 2021-12-20

## 2021-12-20 RX ORDER — AMLODIPINE BESYLATE 10 MG/1
10 TABLET ORAL DAILY
Qty: 30 TABLET | Refills: 0 | Status: CANCELLED | OUTPATIENT
Start: 2021-12-20

## 2021-12-20 NOTE — TELEPHONE ENCOUNTER
Spoke with patient she is out of amlodipine and ibuprofen I did let her know this fill will be only for a month because she needs an appointment for refills and labs. Transferred to the appointment line. Rimma Ann LPN .......................12/20/2021  9:18 AM

## 2021-12-21 ENCOUNTER — OFFICE VISIT (OUTPATIENT)
Dept: FAMILY MEDICINE | Facility: OTHER | Age: 66
End: 2021-12-21
Attending: FAMILY MEDICINE
Payer: COMMERCIAL

## 2021-12-21 VITALS
RESPIRATION RATE: 20 BRPM | OXYGEN SATURATION: 96 % | TEMPERATURE: 98.5 F | DIASTOLIC BLOOD PRESSURE: 60 MMHG | BODY MASS INDEX: 24.63 KG/M2 | HEART RATE: 80 BPM | SYSTOLIC BLOOD PRESSURE: 128 MMHG | WEIGHT: 148 LBS

## 2021-12-21 DIAGNOSIS — Z12.31 ENCOUNTER FOR SCREENING MAMMOGRAM FOR MALIGNANT NEOPLASM OF BREAST: ICD-10-CM

## 2021-12-21 DIAGNOSIS — Z13.9 SCREENING FOR CONDITION: Primary | ICD-10-CM

## 2021-12-21 DIAGNOSIS — Z87.891 PERSONAL HISTORY OF TOBACCO USE: ICD-10-CM

## 2021-12-21 DIAGNOSIS — L40.50 PSORIATIC ARTHRITIS (H): ICD-10-CM

## 2021-12-21 DIAGNOSIS — I10 ESSENTIAL HYPERTENSION: ICD-10-CM

## 2021-12-21 LAB
ALBUMIN SERPL-MCNC: 4.4 G/DL (ref 3.5–5.7)
ALP SERPL-CCNC: 74 U/L (ref 34–104)
ALT SERPL W P-5'-P-CCNC: 55 U/L (ref 7–52)
ANION GAP SERPL CALCULATED.3IONS-SCNC: 9 MMOL/L (ref 3–14)
AST SERPL W P-5'-P-CCNC: 45 U/L (ref 13–39)
BASOPHILS # BLD AUTO: 0 10E3/UL (ref 0–0.2)
BASOPHILS NFR BLD AUTO: 1 %
BILIRUB SERPL-MCNC: 0.4 MG/DL (ref 0.3–1)
BUN SERPL-MCNC: 13 MG/DL (ref 7–25)
CALCIUM SERPL-MCNC: 9.4 MG/DL (ref 8.6–10.3)
CHLORIDE BLD-SCNC: 100 MMOL/L (ref 98–107)
CHOLEST SERPL-MCNC: 287 MG/DL
CO2 SERPL-SCNC: 28 MMOL/L (ref 21–31)
CREAT SERPL-MCNC: 0.76 MG/DL (ref 0.6–1.2)
EOSINOPHIL # BLD AUTO: 0 10E3/UL (ref 0–0.7)
EOSINOPHIL NFR BLD AUTO: 1 %
ERYTHROCYTE [DISTWIDTH] IN BLOOD BY AUTOMATED COUNT: 11.2 % (ref 10–15)
FASTING STATUS PATIENT QL REPORTED: ABNORMAL
GFR SERPL CREATININE-BSD FRML MDRD: 87 ML/MIN/1.73M2
GLUCOSE BLD-MCNC: 98 MG/DL (ref 70–105)
HCT VFR BLD AUTO: 39.3 % (ref 35–47)
HDLC SERPL-MCNC: 66 MG/DL (ref 23–92)
HGB BLD-MCNC: 13.9 G/DL (ref 11.7–15.7)
IMM GRANULOCYTES # BLD: 0 10E3/UL
IMM GRANULOCYTES NFR BLD: 0 %
LDLC SERPL CALC-MCNC: 172 MG/DL
LYMPHOCYTES # BLD AUTO: 1 10E3/UL (ref 0.8–5.3)
LYMPHOCYTES NFR BLD AUTO: 21 %
MCH RBC QN AUTO: 33.3 PG (ref 26.5–33)
MCHC RBC AUTO-ENTMCNC: 35.4 G/DL (ref 31.5–36.5)
MCV RBC AUTO: 94 FL (ref 78–100)
MONOCYTES # BLD AUTO: 0.5 10E3/UL (ref 0–1.3)
MONOCYTES NFR BLD AUTO: 10 %
NEUTROPHILS # BLD AUTO: 3.4 10E3/UL (ref 1.6–8.3)
NEUTROPHILS NFR BLD AUTO: 67 %
NONHDLC SERPL-MCNC: 221 MG/DL
NRBC # BLD AUTO: 0 10E3/UL
NRBC BLD AUTO-RTO: 0 /100
PLATELET # BLD AUTO: 190 10E3/UL (ref 150–450)
POTASSIUM BLD-SCNC: 3.5 MMOL/L (ref 3.5–5.1)
PROT SERPL-MCNC: 7.1 G/DL (ref 6.4–8.9)
RBC # BLD AUTO: 4.18 10E6/UL (ref 3.8–5.2)
SODIUM SERPL-SCNC: 137 MMOL/L (ref 134–144)
TRIGL SERPL-MCNC: 244 MG/DL
WBC # BLD AUTO: 5 10E3/UL (ref 4–11)

## 2021-12-21 PROCEDURE — 99214 OFFICE O/P EST MOD 30 MIN: CPT | Mod: 25 | Performed by: FAMILY MEDICINE

## 2021-12-21 PROCEDURE — 85025 COMPLETE CBC W/AUTO DIFF WBC: CPT | Mod: ZL | Performed by: FAMILY MEDICINE

## 2021-12-21 PROCEDURE — G0463 HOSPITAL OUTPT CLINIC VISIT: HCPCS

## 2021-12-21 PROCEDURE — 82040 ASSAY OF SERUM ALBUMIN: CPT | Mod: ZL | Performed by: FAMILY MEDICINE

## 2021-12-21 PROCEDURE — 80061 LIPID PANEL: CPT | Mod: ZL | Performed by: FAMILY MEDICINE

## 2021-12-21 PROCEDURE — G0463 HOSPITAL OUTPT CLINIC VISIT: HCPCS | Mod: 25

## 2021-12-21 PROCEDURE — 36415 COLL VENOUS BLD VENIPUNCTURE: CPT | Mod: ZL | Performed by: FAMILY MEDICINE

## 2021-12-21 PROCEDURE — G0296 VISIT TO DETERM LDCT ELIG: HCPCS | Performed by: FAMILY MEDICINE

## 2021-12-21 RX ORDER — HYDROCHLOROTHIAZIDE 25 MG/1
25 TABLET ORAL DAILY
Qty: 90 TABLET | Refills: 4 | Status: SHIPPED | OUTPATIENT
Start: 2021-12-21 | End: 2022-01-18

## 2021-12-21 RX ORDER — IBUPROFEN 800 MG/1
TABLET, FILM COATED ORAL
Qty: 200 TABLET | Refills: 4 | Status: SHIPPED | OUTPATIENT
Start: 2021-12-21 | End: 2022-12-01

## 2021-12-21 RX ORDER — AMLODIPINE BESYLATE 10 MG/1
10 TABLET ORAL DAILY
Qty: 90 TABLET | Refills: 4 | Status: SHIPPED | OUTPATIENT
Start: 2021-12-21 | End: 2022-12-01

## 2021-12-21 ASSESSMENT — PAIN SCALES - GENERAL: PAINLEVEL: NO PAIN (0)

## 2021-12-21 NOTE — NURSING NOTE
FOOD SECURITY SCREENING QUESTIONS:    The next two questions are to help us understand your food security.  If you are feeling you need any assistance in this area, we have resources available to support you today.    Hunger Vital Signs:  Within the past 12 months we worried whether our food would run out before we got money to buy more. Never  Within the past 12 months the food we bought just didn't last and we didn't have money to get more. Never    Chief Complaint   Patient presents with     RECHECK     BLood pressure        Medication Reconciliation: completed   Kavitha Gleason LPN  12/21/2021 1:30 PM

## 2021-12-21 NOTE — PROGRESS NOTES
Lung Cancer Screening Shared Decision Making Visit     Amanda Gomez is eligible for lung cancer screening on the basis of the information provided in my signed lung cancer screening order.     I have discussed with patient the risks and benefits of screening for lung cancer with low-dose CT.     The risks include:  radiation exposure: one low dose chest CT has as much ionizing radiation as about 15 chest x-rays or 6 months of background radiation living in [place]    false positives: 96% of positive findings/nodules are NOT cancer, but some might still require additional diagnostic evaluation, including biopsy  over-diagnosis: some slow growing cancers that might never have been clinically significant will be detected and treated unnecessarily     The benefit of early detection of lung cancer is contingent upon adherence to annual screening or more frequent follow up if indicated.     Furthermore, reaping the benefits of screening requires Amanda Gomez to be willing and physically able to undergo diagnostic procedures, if indicated. Although no specific guide is available for determining severity of comorbidities, it is reasonable to withhold screening in patients who have greater mortality risk from other diseases.     We did discuss that the only way to prevent lung cancer is to not smoke. Smoking cessation counseling was not given.      I did offer risk estimation using a calculator such as this one:    SUBJECTIVE:   Amanda Gomez is a 66 year old female who presents to clinic today for the following health issues: Follow-up labs medication    Patient arrives here for follow-up labs and medication.  Review of the chart shows she is not had a follow-up CT scan for a year and a half screening.  She wishes to pursue it.  She also is in need of labs and medications refill.  Tolerating the medications well.  She is in need of mammogram.  Blood pressure has been under good control.        Patient Active  Problem List    Diagnosis Date Noted     Acute non-recurrent maxillary sinusitis 10/23/2021     Priority: Medium     Acute midline low back pain without sciatica 08/17/2021     Priority: Medium     Piriformis syndrome, right 04/04/2020     Priority: Medium     Babesiosis 09/04/2019     Priority: Medium     History of dysplastic nevus 12/30/2015     Priority: Medium     Overview:   Mid-upper back 2014, mild       Recurrent cold sores 05/27/2015     Priority: Medium     Osteopenia 03/08/2014     Priority: Medium     Essential hypertension 08/02/2011     Priority: Medium     Positive GABE (antinuclear antibody) 03/27/2008     Priority: Medium     Overview:   2008: Borderline positive dsDNA, SSB / La, and RNP. ? Significance, on Humira.       Psoriatic arthritis (H) 01/08/2008     Priority: Medium     Overview:   Onset ~ 2007; Raptiva (no benefit); MTX trial (2009), Humira (0371-3539), Enbrel 4/2016       Psoriasis with pustules 10/28/2007     Priority: Medium     Tobacco use disorder 07/09/2003     Priority: Medium     No past medical history on file.   Current Outpatient Medications   Medication Sig Dispense Refill     acetaminophen (TYLENOL) 500 MG tablet Take 500 mg by mouth every 6 hours as needed for mild pain       acyclovir (ZOVIRAX) 5 % external ointment Apply topically every 3 hours as needed 30 g 3     amLODIPine (NORVASC) 10 MG tablet Take 1 tablet (10 mg) by mouth daily 90 tablet 4     aspirin 81 MG EC tablet Take 81 mg by mouth daily       calcipotriene (DOVONOX) 0.005 % external ointment Apply topically 2 times daily as needed       calcium carbonate (OS-MORENA) 500 MG tablet Take 1 tablet by mouth daily       etanercept (ENBREL SURECLICK) 50 MG/ML autoinjector Inject 50 mg Subcutaneous once a week        hydrochlorothiazide (HYDRODIURIL) 25 MG tablet Take 1 tablet (25 mg) by mouth daily 90 tablet 4     ibuprofen (ADVIL/MOTRIN) 800 MG tablet TAKE 1 TABLET EVERY 8 HOURSAS NEEDED FOR MODERATE PAIN 200 tablet 4      lidocaine (LIDODERM) 5 % patch Place 1 patch onto the skin daily as needed for moderate pain To prevent lidocaine toxicity, patient should be patch free for 12 hrs daily.       magnesium 250 MG tablet Take 2 tablets by mouth daily        methocarbamol (ROBAXIN) 750 MG tablet Take 1 tablet (750 mg) by mouth 4 times daily as needed for muscle spasms 20 tablet 0     mometasone (ELOCON) 0.1 % external cream Apply topically daily as needed       mupirocin (BACTROBAN) 2 % external ointment Apply topically 3 times daily as needed       potassium chloride ER (KLOR-CON M) 20 MEQ CR tablet TAKE 1 PACKET WITH DRINK OF CHOICE TWICE DAILY 180 tablet 1     valACYclovir (VALTREX) 500 MG tablet Take 500 mg by mouth 2 times daily as needed       vitamin D3 (CHOLECALCIFEROL) 2000 units (50 mcg) tablet Take 4 tablets by mouth daily       Allergies   Allergen Reactions     Cephalosporins Hives     Clindamycin Hives     Lisinopril Cough     Penicillins Hives     Varenicline        Review of Systems     OBJECTIVE:     /60 (BP Location: Right arm, Patient Position: Sitting, Cuff Size: Adult Regular)   Pulse 80   Temp 98.5  F (36.9  C) (Tympanic)   Resp 20   Wt 67.1 kg (148 lb)   LMP  (LMP Unknown)   SpO2 96%   Breastfeeding No   BMI 24.63 kg/m    Body mass index is 24.63 kg/m .  Physical Exam  Constitutional:       Appearance: Normal appearance.   HENT:      Head: Normocephalic.   Eyes:      Pupils: Pupils are equal, round, and reactive to light.   Cardiovascular:      Rate and Rhythm: Normal rate and regular rhythm.   Pulmonary:      Effort: Pulmonary effort is normal.      Breath sounds: Normal breath sounds.   Neurological:      Mental Status: She is alert.   Psychiatric:         Mood and Affect: Mood normal.         Diagnostic Test Results:  Results for orders placed or performed in visit on 12/21/21 (from the past 24 hour(s))   Lipid Panel   Result Value Ref Range    Cholesterol 287 (H) <200 mg/dL    Triglycerides  244 (H) <150 mg/dL    Direct Measure HDL 66 23 - 92 mg/dL    LDL Cholesterol Calculated 172 (H) <=100 mg/dL    Non HDL Cholesterol 221 (H) <130 mg/dL    Patient Fasting > 8hrs? Unknown     Narrative    Cholesterol  Desirable:  <200 mg/dL    Triglycerides  Normal:  Less than 150 mg/dL  Borderline High:  150-199 mg/dL  High:  200-499 mg/dL  Very High:  Greater than or equal to 500 mg/dL    Direct Measure HDL  Female:  Greater than or equal to 50 mg/dL   Male:  Greater than or equal to 40 mg/dL    LDL Cholesterol  Desirable:  <100mg/dL  Above Desirable:  100-129 mg/dL   Borderline High:  130-159 mg/dL   High:  160-189 mg/dL   Very High:  >= 190 mg/dL    Non HDL Cholesterol  Desirable:  130 mg/dL  Above Desirable:  130-159 mg/dL  Borderline High:  160-189 mg/dL  High:  190-219 mg/dL  Very High:  Greater than or equal to 220 mg/dL   Comprehensive Metabolic Panel   Result Value Ref Range    Sodium 137 134 - 144 mmol/L    Potassium 3.5 3.5 - 5.1 mmol/L    Chloride 100 98 - 107 mmol/L    Carbon Dioxide (CO2) 28 21 - 31 mmol/L    Anion Gap 9 3 - 14 mmol/L    Urea Nitrogen 13 7 - 25 mg/dL    Creatinine 0.76 0.60 - 1.20 mg/dL    Calcium 9.4 8.6 - 10.3 mg/dL    Glucose 98 70 - 105 mg/dL    Alkaline Phosphatase 74 34 - 104 U/L    AST 45 (H) 13 - 39 U/L    ALT 55 (H) 7 - 52 U/L    Protein Total 7.1 6.4 - 8.9 g/dL    Albumin 4.4 3.5 - 5.7 g/dL    Bilirubin Total 0.4 0.3 - 1.0 mg/dL    GFR Estimate 87 >60 mL/min/1.73m2   CBC and Differential    Narrative    The following orders were created for panel order CBC and Differential.  Procedure                               Abnormality         Status                     ---------                               -----------         ------                     CBC with platelets and d...[[id number]]  Abnormal            Final result                 Please view results for these tests on the individual orders.   CBC with platelets and differential   Result Value Ref Range    WBC Count 5.0 4.0 -  11.0 10e3/uL    RBC Count 4.18 3.80 - 5.20 10e6/uL    Hemoglobin 13.9 11.7 - 15.7 g/dL    Hematocrit 39.3 35.0 - 47.0 %    MCV 94 78 - 100 fL    MCH 33.3 (H) 26.5 - 33.0 pg    MCHC 35.4 31.5 - 36.5 g/dL    RDW 11.2 10.0 - 15.0 %    Platelet Count 190 150 - 450 10e3/uL    % Neutrophils 67 %    % Lymphocytes 21 %    % Monocytes 10 %    % Eosinophils 1 %    % Basophils 1 %    % Immature Granulocytes 0 %    NRBCs per 100 WBC 0 <1 /100    Absolute Neutrophils 3.4 1.6 - 8.3 10e3/uL    Absolute Lymphocytes 1.0 0.8 - 5.3 10e3/uL    Absolute Monocytes 0.5 0.0 - 1.3 10e3/uL    Absolute Eosinophils 0.0 0.0 - 0.7 10e3/uL    Absolute Basophils 0.0 0.0 - 0.2 10e3/uL    Absolute Immature Granulocytes 0.0 <=0.4 10e3/uL    Absolute NRBCs 0.0 10e3/uL       ASSESSMENT/PLAN:         (Z13.9) Screening for condition  (primary encounter diagnosis)  Comment: Schedule  Plan: MA Screening Digital Bilateral            (I10) Essential hypertension  Comment: Refill  Plan: amLODIPine (NORVASC) 10 MG tablet,         hydrochlorothiazide (HYDRODIURIL) 25 MG tablet,        Comprehensive Metabolic Panel, Lipid Panel           (L40.50) Psoriatic arthritis (H)  Comment  Plan: ibuprofen (ADVIL/MOTRIN) 800 MG tablet, CBC and        Differential         (Z12.31) Encounter for screening mammogram for malignant neoplasm of breast   Comment:  Plan: MA Screening Digital Bilateral       (Z87.891) Personal history of tobacco use  Comment: CT scan  Plan: Prof Fee: Shared Decision Making Visit for Lung        Cancer Screening, CT Chest Lung Cancer Scrn Low        Dose wo    The 10-year ASCVD risk score (Néstor GEORGE Jr., et al., 2013) is: 7.6%    Values used to calculate the score:      Age: 66 years      Sex: Female      Is Non- : No      Diabetic: No      Tobacco smoker: No      Systolic Blood Pressure: 128 mmHg      Is BP treated: Yes      HDL Cholesterol: 66 mg/dL      Total Cholesterol: 287 mg/dL  _Sti cardiac risk score still below 10%  letter dictated        Ryder Stack MD  [facility] AND [facility]  ShouldIScreen

## 2021-12-21 NOTE — PATIENT INSTRUCTIONS

## 2022-01-15 DIAGNOSIS — I10 ESSENTIAL HYPERTENSION: ICD-10-CM

## 2022-01-18 RX ORDER — HYDROCHLOROTHIAZIDE 25 MG/1
TABLET ORAL
Qty: 90 TABLET | Refills: 3 | Status: SHIPPED | OUTPATIENT
Start: 2021-12-21 | End: 2022-12-01

## 2022-01-18 NOTE — TELEPHONE ENCOUNTER
[facility] sent Rx request for the following:      hydrochlorothiazide (HYDRODIURIL) 25 MG tablet 90 tablet 4 12/21/2021  No   Sig - Route: Take 1 tablet (25 mg) by mouth daily - Oral   Sent to pharmacy as: hydroCHLOROthiazide 25 MG Oral Tablet (HYDRODIURIL)   Class: E-Prescribe   Notes to Pharmacy: Needs to be a 25mg tablet, that she takes once daily   Order: [id number]   E-Prescribing Status: Receipt confirmed by pharmacy (12/21/2021  1:48 PM CST)     [facility] - [place], [place] - [address] AT [address]AL TO [facility]     Prescription approved per [facility] Refill Protocol (new Rx to retail pharmacy per original order). Sharlene Cheng RN .............. 1/18/2022  10:47 AM

## 2022-01-22 ENCOUNTER — HEALTH MAINTENANCE LETTER (OUTPATIENT)
Age: 67
End: 2022-01-22

## 2022-01-24 ENCOUNTER — HOSPITAL ENCOUNTER (OUTPATIENT)
Dept: CT IMAGING | Facility: OTHER | Age: 67
End: 2022-01-24
Attending: FAMILY MEDICINE
Payer: COMMERCIAL

## 2022-01-24 ENCOUNTER — HOSPITAL ENCOUNTER (OUTPATIENT)
Dept: MAMMOGRAPHY | Facility: OTHER | Age: 67
End: 2022-01-24
Attending: FAMILY MEDICINE
Payer: COMMERCIAL

## 2022-01-24 DIAGNOSIS — Z13.9 SCREENING FOR CONDITION: ICD-10-CM

## 2022-01-24 DIAGNOSIS — Z12.31 ENCOUNTER FOR SCREENING MAMMOGRAM FOR MALIGNANT NEOPLASM OF BREAST: ICD-10-CM

## 2022-01-24 DIAGNOSIS — Z87.891 PERSONAL HISTORY OF TOBACCO USE: ICD-10-CM

## 2022-01-24 PROCEDURE — 71271 CT THORAX LUNG CANCER SCR C-: CPT

## 2022-01-24 PROCEDURE — 77067 SCR MAMMO BI INCL CAD: CPT

## 2022-01-25 DIAGNOSIS — S22.000D COMPRESSION FRACTURE OF THORACIC VERTEBRA WITH ROUTINE HEALING, UNSPECIFIED THORACIC VERTEBRAL LEVEL, SUBSEQUENT ENCOUNTER: Primary | ICD-10-CM

## 2022-03-19 ENCOUNTER — HOSPITAL ENCOUNTER (EMERGENCY)
Facility: OTHER | Age: 67
Discharge: HOME | End: 2022-03-19
Attending: STUDENT IN AN ORGANIZED HEALTH CARE EDUCATION/TRAINING PROGRAM | Admitting: STUDENT IN AN ORGANIZED HEALTH CARE EDUCATION/TRAINING PROGRAM
Payer: COMMERCIAL

## 2022-03-19 VITALS
SYSTOLIC BLOOD PRESSURE: 131 MMHG | HEIGHT: 66 IN | DIASTOLIC BLOOD PRESSURE: 65 MMHG | TEMPERATURE: 98.4 F | HEART RATE: 74 BPM | BODY MASS INDEX: 23.3 KG/M2 | OXYGEN SATURATION: 97 % | WEIGHT: 145 LBS | RESPIRATION RATE: 16 BRPM

## 2022-03-19 DIAGNOSIS — S16.1XXA ACUTE STRAIN OF NECK MUSCLE, INITIAL ENCOUNTER: ICD-10-CM

## 2022-03-19 PROCEDURE — 250N000013 HC RX MED GY IP 250 OP 250 PS 637: Performed by: STUDENT IN AN ORGANIZED HEALTH CARE EDUCATION/TRAINING PROGRAM

## 2022-03-19 PROCEDURE — 99284 EMERGENCY DEPT VISIT MOD MDM: CPT | Performed by: STUDENT IN AN ORGANIZED HEALTH CARE EDUCATION/TRAINING PROGRAM

## 2022-03-19 PROCEDURE — 250N000011 HC RX IP 250 OP 636: Performed by: STUDENT IN AN ORGANIZED HEALTH CARE EDUCATION/TRAINING PROGRAM

## 2022-03-19 PROCEDURE — 96372 THER/PROPH/DIAG INJ SC/IM: CPT | Performed by: STUDENT IN AN ORGANIZED HEALTH CARE EDUCATION/TRAINING PROGRAM

## 2022-03-19 PROCEDURE — 99283 EMERGENCY DEPT VISIT LOW MDM: CPT | Performed by: STUDENT IN AN ORGANIZED HEALTH CARE EDUCATION/TRAINING PROGRAM

## 2022-03-19 RX ORDER — CYCLOBENZAPRINE HCL 10 MG
10 TABLET ORAL 3 TIMES DAILY PRN
Qty: 10 TABLET | Refills: 0 | Status: SHIPPED | OUTPATIENT
Start: 2022-03-19 | End: 2022-03-22

## 2022-03-19 RX ORDER — CYCLOBENZAPRINE HCL 10 MG
10 TABLET ORAL ONCE
Status: COMPLETED | OUTPATIENT
Start: 2022-03-19 | End: 2022-03-19

## 2022-03-19 RX ORDER — PREDNISONE 20 MG/1
TABLET ORAL
Qty: 5 TABLET | Refills: 0 | Status: SHIPPED | OUTPATIENT
Start: 2022-03-19 | End: 2022-03-26

## 2022-03-19 RX ORDER — DEXAMETHASONE SODIUM PHOSPHATE 10 MG/ML
10 INJECTION, SOLUTION INTRAMUSCULAR; INTRAVENOUS ONCE
Status: COMPLETED | OUTPATIENT
Start: 2022-03-19 | End: 2022-03-19

## 2022-03-19 RX ADMIN — CYCLOBENZAPRINE 10 MG: 10 TABLET, FILM COATED ORAL at 09:13

## 2022-03-19 RX ADMIN — DEXAMETHASONE SODIUM PHOSPHATE 10 MG: 10 INJECTION, SOLUTION INTRAMUSCULAR; INTRAVENOUS at 09:13

## 2022-03-19 NOTE — ED TRIAGE NOTES
ED Nursing Triage Note (General)   ________________________________    Amanda ANNA Gomez is a 66 year old Female that presents to triage private car  With history of  Neck stiffness woke up Wednesday reported by patient   Significant symptoms had onset 4 day(s) ago.  LMP  (LMP Unknown) t  Patient appears alert  and oriented, in no acute distress., and cooperative, pleasant and calm behavior.    GCS Eye Opening = 4=Spontaneous  Airway: intact  Breathing noted as Normal  Circulation Normal  Skin:  Normal  Action taken:  Triage order initiated      PRE HOSPITAL PRIOR LIVING SITUATION Alone

## 2022-03-19 NOTE — DISCHARGE INSTRUCTIONS
Use flexeril muscle relaxant as needed. Complete prednisone taper. Please review attached instructions including reasons to return to the emergency department.

## 2022-03-19 NOTE — ED PROVIDER NOTES
History     Chief Complaint   Patient presents with     Neck Pain       Amanda Gomez is a 66 year old female who presents with neck pain.  4 nights ago patient slept in a different bed with her grandchildren and awoke with posterior cervical neck pain.  She has been trying heat, cold, Biofreeze, Tylenol/ibuprofen with no improvement.  Earlier this morning pain worsened becoming 6-7/10 in severity, precluding further sleep.  Denies any recent trauma.  Pain is described as burning with radiation into her right shoulder.  She states almost feels like past shingles infection.  Associated symptoms include some mild nausea which she attributes to the pain.  Denies fever, chills, dyspnea, chest pain, other new pain, vomiting, numbness, weakness, tingling, vision change, headache.  Her past prescription for methocarbamol is old and she does not have any muscle relaxants available.  She states in the past these have not worked for her lower back pain, however a course of steroids did help within 6 hours.  At that time she had no radiation of her back pain as it was just focal.    Allergies   Allergen Reactions     Cephalosporins Hives     Clindamycin Hives     Lisinopril Cough     Penicillins Hives     Varenicline        Patient Active Problem List    Diagnosis Date Noted     Acute non-recurrent maxillary sinusitis 10/23/2021     Priority: Medium     Acute midline low back pain without sciatica 08/17/2021     Priority: Medium     Piriformis syndrome, right 04/04/2020     Priority: Medium     Babesiosis 09/04/2019     Priority: Medium     History of dysplastic nevus 12/30/2015     Priority: Medium     Overview:   Mid-upper back 2014, mild       Recurrent cold sores 05/27/2015     Priority: Medium     Osteopenia 03/08/2014     Priority: Medium     Essential hypertension 08/02/2011     Priority: Medium     Positive GABE (antinuclear antibody) 03/27/2008     Priority: Medium     Overview:   2008: Borderline positive dsDNA,  "SSB / La, and RNP. ? Significance, on Humira.       Psoriatic arthritis (H) 2008     Priority: Medium     Overview:   Onset ~ ; Raptiva (no benefit); MTX trial (), Humira ([id number]), Enbrel 2016       Psoriasis with pustules 10/28/2007     Priority: Medium     Tobacco use disorder 2003     Priority: Medium       No past medical history on file.    No past surgical history on file.    Family History   Problem Relation Age of Onset     Rheumatoid Arthritis Other        Social History     Tobacco Use     Smoking status: Former Smoker     Packs/day: 1.00     Years: 35.00     Pack years: 35.00     Quit date: 3/4/2017     Years since quittin.0     Smokeless tobacco: Former User     Quit date: 2017   Vaping Use     Vaping Use: Never used   Substance Use Topics     Alcohol use: Yes     Comment: occassionally     Drug use: Never       Medications:    cyclobenzaprine (FLEXERIL) 10 MG tablet  predniSONE (DELTASONE) 20 MG tablet  acetaminophen (TYLENOL) 500 MG tablet  acyclovir (ZOVIRAX) 5 % external ointment  amLODIPine (NORVASC) 10 MG tablet  aspirin 81 MG EC tablet  calcipotriene (DOVONOX) 0.005 % external ointment  calcium carbonate (OS-MORENA) 500 MG tablet  etanercept (ENBREL SURECLICK) 50 MG/ML autoinjector  hydrochlorothiazide (HYDRODIURIL) 25 MG tablet  ibuprofen (ADVIL/MOTRIN) 800 MG tablet  lidocaine (LIDODERM) 5 % patch  magnesium 250 MG tablet  methocarbamol (ROBAXIN) 750 MG tablet  mometasone (ELOCON) 0.1 % external cream  mupirocin (BACTROBAN) 2 % external ointment  potassium chloride ER (KLOR-CON M) 20 MEQ CR tablet  valACYclovir (VALTREX) 500 MG tablet  vitamin D3 (CHOLECALCIFEROL) 2000 units (50 mcg) tablet        Review of Systems: See HPI for pertinent negatives and positives. All other systems reviewed and found to be negative.    Physical Exam   BP (!) 161/88   Pulse 72   Temp 98.4  F (36.9  C) (Tympanic)   Resp 16   Ht 1.676 m (5' 6\")   Wt 65.8 kg (145 lb)   LMP  (LMP " Unknown)   SpO2 97%   BMI 23.40 kg/m       General: awake, mildly uncomfortable  HEENT: atraumatic  Respiratory: normal effort, clear to auscultation bilaterally  Cardiovascular: regular rate and rhythm, no murmurs  Extremities: no gross deformities, normal upper extremity strength and no neck pain with resisted bilateral arm abduction/abduction  Skin: warm, dry, no rashes or ecchymosis or swelling around neck  Back: no spinal tenderness, mild discomfort with Spurling maneuver  Neuro: alert, normal sensation of upper extremities  Psych: appropriate mood and affect    ED Course           No results found for this or any previous visit (from the past 24 hour(s)).    Medications   cyclobenzaprine (FLEXERIL) tablet 10 mg (10 mg Oral Given 3/19/22 0913)   dexamethasone PF (DECADRON) injection 10 mg (10 mg Intramuscular Given 3/19/22 0913)       Assessments & Plan (with Medical Decision Making)     I have reviewed the nursing notes.    66 year old female evaluated for cervical neck pain after sleeping on a new bed with grandkids.  History and exam appears most consistent with muscular strain.  Significant improvement now with discomfort down to 3/10 after above medications and patient requesting discharge home.  She was prescribed Flexeril and a prednisone lower dose taper. Attached instructions on diagnosis including ED return precautions given. Patient verbalizes understanding of plan and is comfortable with discharge home. Discharged home in stable condition.    I have reviewed the findings, diagnosis, plan, and need for any follow up with the patient.    New Prescriptions    CYCLOBENZAPRINE (FLEXERIL) 10 MG TABLET    Take 1 tablet (10 mg) by mouth 3 times daily as needed for muscle spasms    PREDNISONE (DELTASONE) 20 MG TABLET    1 tab daily for 3 days, then 1/2 tab daily for 4 days       Final diagnoses:   Acute strain of neck muscle, initial encounter       3/19/2022   [facility] AND [facility],  MD Cristian  03/19/22 0951

## 2022-04-05 ENCOUNTER — OFFICE VISIT (OUTPATIENT)
Dept: FAMILY MEDICINE | Facility: OTHER | Age: 67
End: 2022-04-05
Attending: FAMILY MEDICINE
Payer: COMMERCIAL

## 2022-04-05 VITALS
RESPIRATION RATE: 20 BRPM | HEIGHT: 66 IN | DIASTOLIC BLOOD PRESSURE: 64 MMHG | OXYGEN SATURATION: 96 % | BODY MASS INDEX: 24.19 KG/M2 | WEIGHT: 150.5 LBS | HEART RATE: 66 BPM | SYSTOLIC BLOOD PRESSURE: 130 MMHG | TEMPERATURE: 98.2 F

## 2022-04-05 DIAGNOSIS — M54.2 NECK PAIN: Primary | ICD-10-CM

## 2022-04-05 PROCEDURE — 99213 OFFICE O/P EST LOW 20 MIN: CPT | Performed by: FAMILY MEDICINE

## 2022-04-05 PROCEDURE — G0463 HOSPITAL OUTPT CLINIC VISIT: HCPCS

## 2022-04-05 RX ORDER — CYCLOBENZAPRINE HCL 10 MG
10 TABLET ORAL 3 TIMES DAILY PRN
Qty: 60 TABLET | Refills: 1 | Status: SHIPPED | OUTPATIENT
Start: 2022-04-05 | End: 2023-04-20

## 2022-04-05 ASSESSMENT — PAIN SCALES - GENERAL: PAINLEVEL: MODERATE PAIN (5)

## 2022-04-05 NOTE — NURSING NOTE
Patient presents to clinic after ER visit on 03/19 for neck sprain.  She is experiencing pain rated 5 when turning neck.    Medication Rec Complete  Sharlene Lowe LPN............4/5/2022 10:12 AM

## 2022-04-05 NOTE — PROGRESS NOTES
Assessment & Plan     Neck pain  Continue Flexeril.  Set up for physical therapy  - cyclobenzaprine (FLEXERIL) 10 MG tablet; Take 1 tablet (10 mg) by mouth 3 times daily as needed for muscle spasms  - Physical Therapy Referral; Future                 No follow-ups on file.    Ryder Stack MD  [facility]    Carlton Conklin is a 66 year old who presents for the following health issues   Patient presents to clinic after ER visit on 03/19 for neck sprain.  She is experiencing pain rated 5 when turning neck and keeps her up at night.    Follow-up neck pain.  Recently seen in the ER on the 19th but 2 weeks ago.  Was prescribed Flexeril and reports it worked very well.  She did run out.  She continues to have right-sided neck pain.  No trauma or injury that she is aware of no weakness in the upper or lower extremities.  Good strength.  No tingling    History of Present Illness       Reason for visit:  Stiff neck  Symptom onset:  1-2 weeks ago  Symptoms include:  Sore neck  Symptom intensity:  Severe  Symptom progression:  Worsening  Had these symptoms before:  Yes  Has tried/received treatment for these symptoms:  Yes  Previous treatment was successful:  Yes  Prior treatment description:  Muscle relaxers  What makes it worse:  Driving  What makes it better:  Hot and cold packs    She eats 2-3 servings of fruits and vegetables daily.She consumes 0 sweetened beverage(s) daily.She exercises with enough effort to increase her heart rate 10 to 19 minutes per day.  She exercises with enough effort to increase her heart rate 5 days per week.   She is taking medications regularly.       ED/UC Followup:    Facility:  [facility]   Date of visit: 03/19/22  Reason for visit: neck sprain  Current Status: ongoing pain rated 5 with movement           Review of Systems         Objective    /64 (BP Location: Right arm, Patient Position: Sitting, Cuff Size: Adult Regular)   Pulse 66   Temp 98.2  F (36.8  " C) (Tympanic)   Resp 20   Ht 1.676 m (5' 6\")   Wt 68.3 kg (150 lb 8 oz)   LMP  (LMP Unknown)   SpO2 96%   Breastfeeding No   BMI 24.29 kg/m    Body mass index is 24.29 kg/m .  Physical Exam  Constitutional:       Appearance: Normal appearance.   Neck:      Comments: No pain along the spinous processes.  She does have pain on palpation to the neck muscle especially the right side  Musculoskeletal:      Cervical back: Tenderness present.   Neurological:      Mental Status: She is alert.                        "

## 2022-04-29 ENCOUNTER — HOSPITAL ENCOUNTER (OUTPATIENT)
Dept: PHYSICAL THERAPY | Facility: OTHER | Age: 67
Setting detail: THERAPIES SERIES
Discharge: HOME | End: 2022-04-29
Attending: FAMILY MEDICINE
Payer: COMMERCIAL

## 2022-04-29 DIAGNOSIS — M54.2 NECK PAIN: ICD-10-CM

## 2022-04-29 PROCEDURE — 97161 PT EVAL LOW COMPLEX 20 MIN: CPT | Mod: GP | Performed by: PHYSICAL THERAPIST

## 2022-04-29 PROCEDURE — 97140 MANUAL THERAPY 1/> REGIONS: CPT | Mod: GP | Performed by: PHYSICAL THERAPIST

## 2022-04-29 PROCEDURE — 97110 THERAPEUTIC EXERCISES: CPT | Mod: GP | Performed by: PHYSICAL THERAPIST

## 2022-04-29 NOTE — PROGRESS NOTES
[facility]    OUTPATIENT PHYSICAL THERAPY ORTHOPEDIC EVALUATION  PLAN OF TREATMENT FOR OUTPATIENT REHABILITATION  (COMPLETE FOR INITIAL CLAIMS ONLY)  Patient's Last Name, First Name, M.I.  YOB: 1955  Tabitha Gomeze  ANNA    Provider s Name:  [facility]   Medical Record No.  [id number]   Start of Care Date:  04/29/22   Onset Date:  04/05/22   Type:     _X__PT   ___OT   ___SLP Medical Diagnosis:  M54.2 (ICD-10-CM) - Neck pain     PT Diagnosis:  Neck pain, impaired cervical range of motion, facet dysfunction.   Visits from SOC:  1      _________________________________________________________________________________  Plan of Treatment/Functional Goals:  joint mobilization, manual therapy, neuromuscular re-education, ROM, strengthening, stretching  IASTM, MFR, STM  Electrical stimulation, Traction, Ultrasound, TENS, Cryotherapy, Hot packs  Phonophoresis with 10% Ketoprofen  Goals  Goal Identifier: Sleep  Goal Description: Patient will be able to sleep through the night 6 hours without waking due to neck pain.  Target Date: 06/24/22    Goal Identifier: Meal prep  Goal Description: Patient will be able to  her kitchen looking down with meal preparation for 30 minutes with less than 3/10 pain.  Target Date: 06/24/22    Goal Identifier: Driving  Goal Description: Patient will be able to drive with minimal pain over a 30-minute timeframe and no difficulty turning her head side to side looking left and right.  Target Date: 06/24/22                                                           Therapy Frequency:  other (see comments) (16 visits)  Predicted Duration of Therapy Intervention:  8 weeks    Dave Torre, PT                 I CERTIFY THE NEED FOR THESE SERVICES FURNISHED UNDER        THIS PLAN OF TREATMENT AND WHILE UNDER MY CARE     (Physician co-signature of  this document indicates review and certification of the therapy plan).                     Certification Date From:  04/29/22   Certification Date To:  06/24/22    Referring Provider:  Dr. Seda YARBROUGH    Initial Assessment        See Epic Evaluation Start of Care Date: 04/29/22

## 2022-04-29 NOTE — PROGRESS NOTES
04/29/22 1030   General Information   Type of Visit Initial OP Ortho PT Evaluation   Start of Care Date 04/29/22   Referring Physician Dr. Seda YARBROUGH   Orders Evaluate and Treat   Date of Order 04/05/22   Certification Required? Yes   Medical Diagnosis M54.2 (ICD-10-CM) - Neck pain   Surgical/Medical history reviewed Yes   Special Instructions Precaution-psoriatic arthritis   General Information Comments Patient is a 66-year-old female who reports onset of right-sided neck pain a few weeks ago.  She awoke with moderate to severe neck pain 1 morning.  No mechanism of injury from a fall or injury.  She is having difficulty turning her head side to side looking down for prolonged periods of time preparing meals and with her work activities.  She works at [employer] as an .  Pain increases to 6/10 daily.  Difficulty with sleep and getting comfortable at night.  Patient has history of psoriatic arthritis and is on Enbrel.  Her primary goal is to decrease her neck pain to be able to improve sleep and improve her work activities.  She would also like to improve her neck motion and mobility.   Body Part(s)   Body Part(s) Cervical Spine   Presentation and Etiology   Pertinent history of current problem (include personal factors and/or comorbidities that impact the POC) Low complexity   How/Where did it occur From insidious onset   Onset date of current episode/exacerbation 04/05/22   Pain rating (0-10 point scale) Best (/10);Worst (/10)   Best (/10) 0/10   Worst (/10) 6/10   Fall Risk Screen   Fall screen completed by PT   Have you fallen 2 or more times in the past year? Yes   Have you fallen and had an injury in the past year? Yes   Is patient a fall risk? Yes;Department fall risk interventions implemented   Cervical Spine   Cervical Left Side Bending ROM 20 degrees pain on right   Cervical Right Rotation ROM 40 degrees pain on right   Cervical Left Rotation ROM 46 degrees tightness right   Cervical  Flexion ROM 38 degrees   Cervical Extension ROM 30 degrees   Cervical Right Side Bending ROM 23 degrees pain on the right   Thoracic Right Rotation Impaired   Thoracic Left Rotation Impaired   Shoulder AROM Screen Within functional limits   Pectoralis Minor Flexibility Decreased flexibility bilaterally left worse than right.   Palpation Suboccipital muscle tension tightness, decreased flexibility and muscle tension right upper trap, scalenes, levator Scap right greater than left.   Observation Forward head position 10 degrees resting position, limited range of motion cervical spine looking up and down, side to side with rotation.  Pain right-sided neck with right rotation and right lateral flexion.  Pinching sensation lower cervical spine observed.   Posture Fair posture with rounded shoulder positioning and forward head position.  Difficulty sitting and standing upright.   Planned Therapy Interventions   Planned Therapy Interventions joint mobilization;manual therapy;neuromuscular re-education;ROM;strengthening;stretching   Planned Therapy Interventions Comment IASTM, MFR, STM   Planned Modality Interventions   Planned Modality Interventions Electrical stimulation;Traction;Ultrasound;TENS;Cryotherapy;Hot packs   Planned Modality Interventions Comments Phonophoresis with 10% Ketoprofen   Clinical Impression   Criteria for Skilled Therapeutic Interventions Met yes, treatment indicated   PT Diagnosis Neck pain, impaired cervical range of motion, facet dysfunction.   Clinical Presentation Stable/Uncomplicated   Clinical Decision Making (Complexity) Low complexity   Therapy Frequency other (see comments)  (16 visits)   Predicted Duration of Therapy Intervention (days/wks) 8 weeks   Risk & Benefits of therapy have been explained Yes   Patient, Family & other staff in agreement with plan of care Yes   ORTHO GOALS   PT Ortho Eval Goals 1;2;3   Ortho Goal 1   Goal Identifier Sleep   Goal Description Patient will be able to  sleep through the night 6 hours without waking due to neck pain.   Goal Progress N/A   Target Date 06/24/22   Ortho Goal 2   Goal Identifier Meal prep   Goal Description Patient will be able to  her kitchen looking down with meal preparation for 30 minutes with less than 3/10 pain.   Goal Progress N/A   Target Date 06/24/22   Ortho Goal 3   Goal Identifier Driving   Goal Description Patient will be able to drive with minimal pain over a 30-minute timeframe and no difficulty turning her head side to side looking left and right.   Goal Progress N/A   Target Date 06/24/22   Total Evaluation Time   PT Eval, Low Complexity Minutes (94852) 30   Therapy Certification   Certification date from 04/29/22   Certification date to 06/24/22   Medical Diagnosis M54.2 (ICD-10-CM) - Neck pain

## 2022-05-03 ENCOUNTER — HOSPITAL ENCOUNTER (OUTPATIENT)
Dept: PHYSICAL THERAPY | Facility: OTHER | Age: 67
Setting detail: THERAPIES SERIES
Discharge: HOME | End: 2022-05-03
Attending: FAMILY MEDICINE
Payer: COMMERCIAL

## 2022-05-03 PROCEDURE — 97140 MANUAL THERAPY 1/> REGIONS: CPT | Mod: GP | Performed by: PHYSICAL THERAPIST

## 2022-05-03 PROCEDURE — 97110 THERAPEUTIC EXERCISES: CPT | Mod: GP | Performed by: PHYSICAL THERAPIST

## 2022-05-03 PROCEDURE — 97112 NEUROMUSCULAR REEDUCATION: CPT | Mod: GP | Performed by: PHYSICAL THERAPIST

## 2022-05-06 ENCOUNTER — HOSPITAL ENCOUNTER (OUTPATIENT)
Dept: PHYSICAL THERAPY | Facility: OTHER | Age: 67
Setting detail: THERAPIES SERIES
Discharge: HOME | End: 2022-05-06
Attending: FAMILY MEDICINE
Payer: COMMERCIAL

## 2022-05-06 PROCEDURE — 97112 NEUROMUSCULAR REEDUCATION: CPT | Mod: GP | Performed by: PHYSICAL THERAPIST

## 2022-05-10 ENCOUNTER — HOSPITAL ENCOUNTER (OUTPATIENT)
Dept: PHYSICAL THERAPY | Facility: OTHER | Age: 67
Setting detail: THERAPIES SERIES
Discharge: HOME | End: 2022-05-10
Attending: FAMILY MEDICINE
Payer: COMMERCIAL

## 2022-05-10 PROCEDURE — 97110 THERAPEUTIC EXERCISES: CPT | Mod: GP | Performed by: PHYSICAL THERAPIST

## 2022-05-10 PROCEDURE — 97112 NEUROMUSCULAR REEDUCATION: CPT | Mod: GP | Performed by: PHYSICAL THERAPIST

## 2022-05-10 PROCEDURE — 97140 MANUAL THERAPY 1/> REGIONS: CPT | Mod: GP | Performed by: PHYSICAL THERAPIST

## 2022-05-17 ENCOUNTER — HOSPITAL ENCOUNTER (OUTPATIENT)
Dept: PHYSICAL THERAPY | Facility: OTHER | Age: 67
Setting detail: THERAPIES SERIES
Discharge: HOME | End: 2022-05-17
Attending: FAMILY MEDICINE
Payer: COMMERCIAL

## 2022-05-17 PROCEDURE — 97140 MANUAL THERAPY 1/> REGIONS: CPT | Mod: GP | Performed by: PHYSICAL THERAPIST

## 2022-05-17 PROCEDURE — 97112 NEUROMUSCULAR REEDUCATION: CPT | Mod: GP | Performed by: PHYSICAL THERAPIST

## 2022-05-17 PROCEDURE — 97110 THERAPEUTIC EXERCISES: CPT | Mod: GP | Performed by: PHYSICAL THERAPIST

## 2022-05-23 ENCOUNTER — OFFICE VISIT (OUTPATIENT)
Dept: FAMILY MEDICINE | Facility: OTHER | Age: 67
End: 2022-05-23
Attending: FAMILY MEDICINE
Payer: COMMERCIAL

## 2022-05-23 ENCOUNTER — HOSPITAL ENCOUNTER (OUTPATIENT)
Dept: GENERAL RADIOLOGY | Facility: OTHER | Age: 67
Discharge: HOME | End: 2022-05-23
Attending: FAMILY MEDICINE
Payer: COMMERCIAL

## 2022-05-23 VITALS
SYSTOLIC BLOOD PRESSURE: 126 MMHG | TEMPERATURE: 98.4 F | RESPIRATION RATE: 16 BRPM | HEIGHT: 66 IN | BODY MASS INDEX: 24.85 KG/M2 | WEIGHT: 154.6 LBS | DIASTOLIC BLOOD PRESSURE: 62 MMHG | OXYGEN SATURATION: 97 % | HEART RATE: 56 BPM

## 2022-05-23 DIAGNOSIS — M79.671 RIGHT FOOT PAIN: ICD-10-CM

## 2022-05-23 DIAGNOSIS — M79.671 RIGHT FOOT PAIN: Primary | ICD-10-CM

## 2022-05-23 PROCEDURE — 99213 OFFICE O/P EST LOW 20 MIN: CPT | Performed by: FAMILY MEDICINE

## 2022-05-23 PROCEDURE — 73630 X-RAY EXAM OF FOOT: CPT | Mod: RT

## 2022-05-23 PROCEDURE — G0463 HOSPITAL OUTPT CLINIC VISIT: HCPCS

## 2022-05-23 ASSESSMENT — PATIENT HEALTH QUESTIONNAIRE - PHQ9
10. IF YOU CHECKED OFF ANY PROBLEMS, HOW DIFFICULT HAVE THESE PROBLEMS MADE IT FOR YOU TO DO YOUR WORK, TAKE CARE OF THINGS AT HOME, OR GET ALONG WITH OTHER PEOPLE: NOT DIFFICULT AT ALL
SUM OF ALL RESPONSES TO PHQ QUESTIONS 1-9: 0
SUM OF ALL RESPONSES TO PHQ QUESTIONS 1-9: 0

## 2022-05-23 ASSESSMENT — PAIN SCALES - GENERAL: PAINLEVEL: MODERATE PAIN (4)

## 2022-05-23 NOTE — NURSING NOTE
Patient presents today or right foot pain. Patient has been going to physical therapy for her neck and it was suggested that she have her foot pain checked out.    Medication Reconciliation Complete    Perlita Gastelum LPN  5/23/2022 2:53 PM

## 2022-05-23 NOTE — PROGRESS NOTES
Assessment & Plan     Right foot pain  Given the chronicity of symptoms x-rays performed, personally reviewed and shows calcific tendinopathy noted near her cuboid.  No other acute bony abnormalities.  Discussed that her symptoms are likely related to plantar fasciitis and her lateral symptoms are likely related to compensation.  Would recommend good footwear as she is doing, arch support, ice, increase ibuprofen to 3 times daily with meals and physical therapy.  New referral was completed.  If she has no improvement would consider MRI versus trial of steroid injection versus referral to Dr. Muniz.  - XR Foot Right G/E 3 Views; Future                 No follow-ups on file.    Wilder Minal  [facility]    Carlton Conklin is a 66 year old who presents for the following health issues     She has had right foot pain for last 3 to 4 weeks.  She describes pain as starting in her medial heel, wrapping around her posterior ankle up into her lateral midfoot.  She does not recall any particular trauma that brought this on.  She is currently undergoing physical therapy for her psoriatic arthritis and has been having difficulty completing her exercises due to her foot pain.  Her pain seems to be worse right away in the morning but also is worse if she is sitting for prolonged period of time.  Seems to get better the more active she is.  She has tried to adjust her footwear and has had to slowly transition into her new shoes.    History of Present Illness       Reason for visit:  Try foot pain  Symptom onset:  3-4 weeks ago  Symptoms include:  Horrible pain at times when walking  Symptom intensity:  Moderate  Symptom progression:  Improving  Had these symptoms before:  No  What makes it worse:  No  What makes it better:  Ice and stretching    She eats 4 or more servings of fruits and vegetables daily.She consumes 0 sweetened beverage(s) daily.She exercises with enough effort to increase her heart  "rate 30 to 60 minutes per day.  She exercises with enough effort to increase her heart rate 4 days per week.   She is taking medications regularly.    Today's PHQ-9         PHQ-9 Total Score: 0    PHQ-9 Q9 Thoughts of better off dead/self-harm past 2 weeks :   Not at all    How difficult have these problems made it for you to do your work, take care of things at home, or get along with other people: Not difficult at all             Review of Systems         Objective    /62   Pulse 56   Temp 98.4  F (36.9  C)   Resp 16   Ht 1.676 m (5' 6\")   Wt 70.1 kg (154 lb 9.6 oz)   LMP  (LMP Unknown)   SpO2 97%   BMI 24.95 kg/m    Body mass index is 24.95 kg/m .  Physical Exam   GENERAL: healthy, alert and no distress  MS: Right ankle shows reduced range of motion in all planes due to stiffness.  She has tenderness palpation diffusely especially along her inferior calcaneus, posterior calcaneus/insertion of Achilles and diffusely along her lateral ligaments and lateral midfoot.  No medial malleoli or or distal foot tenderness to palpation.  Skin: No bruising, normal cap refill.  Neuro: Normal distal sensation to light touch.                "

## 2022-06-01 ENCOUNTER — HOSPITAL ENCOUNTER (OUTPATIENT)
Dept: PHYSICAL THERAPY | Facility: OTHER | Age: 67
Setting detail: THERAPIES SERIES
Discharge: HOME | End: 2022-06-01
Attending: FAMILY MEDICINE
Payer: COMMERCIAL

## 2022-06-01 DIAGNOSIS — M79.671 RIGHT FOOT PAIN: ICD-10-CM

## 2022-06-01 PROCEDURE — 97110 THERAPEUTIC EXERCISES: CPT | Mod: GP | Performed by: PHYSICAL THERAPIST

## 2022-06-01 PROCEDURE — 97035 APP MDLTY 1+ULTRASOUND EA 15: CPT | Mod: GP | Performed by: PHYSICAL THERAPIST

## 2022-06-01 PROCEDURE — 97161 PT EVAL LOW COMPLEX 20 MIN: CPT | Mod: GP | Performed by: PHYSICAL THERAPIST

## 2022-06-02 NOTE — PROGRESS NOTES
[facility]    Outpatient Physical Therapy Discharge Note  Patient: Amanda Gomez  : 1955    Therapy Diagnosis: Neck pain, impaired cervical ROM and flexibility.     Client Self Report: Complaints of neck stiffness and tightness.  Right more then left.  Limited ROM of neck.  Pain is decreasing.  Overall 50% improvement.  Recent increase in right foot and ankle pain.  Possible plantar fasciitis and tendinitis.  Referred to MD for assessment.       Objective Measurements:     Mild impairment of cervical ROM and flexibility  Mild posture impairment                        Goals:  Goal Identifier Sleep   Goal Description Patient will be able to sleep through the night 6 hours without waking due to neck pain.   Target Date 22   Date Met   Not yet met   Progress (detail required for progress note): Progressing     Goal Identifier Meal prep   Goal Description Patient will be able to  her kitchen looking down with meal preparation for 30 minutes with less than 3/10 pain.   Target Date 22   Date Met   Not yet met   Progress (detail required for progress note): Progressing     Goal Identifier Driving   Goal Description Patient will be able to drive with minimal pain over a 30-minute timeframe and no difficulty turning her head side to side looking left and right.   Target Date 22   Date Met   Not yet met    Progress (detail required for progress note):  Progressing         Plan:  Discharge from therapy.    Discharge:    Reason for Discharge: Patient would like to have PT treatment for right foot and ankle pain.  Discontinue neck pain treatment. Progressing with home program.     Discharge Plan: Patient to continue home program.

## 2022-06-03 NOTE — PROGRESS NOTES
06/01/22 1500   General Information   Type of Visit Initial OP Ortho PT Evaluation   Start of Care Date 06/01/22   Referring Physician Rylan Fontaine MD   Patient/Family Goals Statement Decrease right foot and ankle pain to be able to walk long distances and hike long distances.   Orders Evaluate and Treat   Date of Order 05/23/22   Certification Required? Yes   Medical Diagnosis M79.671 (ICD-10-CM) - Right foot pain   General Information Comments She will reports onset of right foot and ankle pain approximately May 1, 2022.  Pain is progressively gotten worse.  8/10 pain with prolonged walking and standing.  The foot and ankle pain limits walking and hiking type activities.  Also limits work activities.  Secondary complaints of loss of movement right ankle and foot, stiffness, weakness.  No mechanism of injury.   Body Part(s)   Body Part(s) Ankle/Foot   Presentation and Etiology   Pertinent history of current problem (include personal factors and/or comorbidities that impact the POC) Low   Impairments A. Pain;B. Decreased WB tolerance;C. Swelling;D. Decreased ROM;E. Decreased flexibility;F. Decreased strength and endurance;G. Impaired balance;H. Impaired gait   Functional Limitations perform required work activities;perform desired leisure / sports activities   Symptom Location Right ankle, right plantar fascia, right Achilles tendon region.   How/Where did it occur From insidious onset   Onset date of current episode/exacerbation 05/01/22   Chronicity New   Pain rating (0-10 point scale) Best (/10);Worst (/10)   Best (/10) 0/10   Worst (/10) 8/10   Fall Risk Screen   Fall screen completed by PT   Have you fallen 2 or more times in the past year? Yes   Have you fallen and had an injury in the past year? No   Is patient a fall risk? Yes;Department fall risk interventions implemented   Ankle/Foot Objective Findings   Side (if bilateral, select both right and left) Right   Observation Swelling Achilles tendon and  posterior tib region.   Ankle/Foot ROM Comment Impaired range of motion right ankle   Palpation Right Achilles tendon tenderness, posterior tibialis tenderness medial ankle, peroneal tendon tenderness lateral ankle, plantar fascia tenderness at heel.   Gait/Locomotion Antalgic gait right lower extremity, difficulty full weightbearing.  Ambulates with external rotation of right lower extremity.   Balance/ Proprioception (Single Leg Stance) Impaired right   Ankle/Foot Strength Comments 3/5 calf raise   Right Gastroc (in WB) Flexibility Impaired   Right DF (Knee Ext) AROM 5 degrees and limited   Right PF AROM 50 degrees and limited   Right Calcanceal Inversion AROM Impaired   Planned Therapy Interventions   Planned Therapy Interventions balance training;gait training;manual therapy;neuromuscular re-education;ROM;strengthening;stretching   Planned Therapy Interventions Comment IASTM, MFR, STM   Planned Modality Interventions   Planned Modality Interventions Cryotherapy;Iontophoresis;Ultrasound;Hot packs   Planned Modality Interventions Comments Phonophoresis with 10% ketoprofen, iontophoresis with 4% dexamethasone   Clinical Impression   Criteria for Skilled Therapeutic Interventions Met yes, treatment indicated   PT Diagnosis Difficulty with gait and balance secondary to right ankle and foot pain.  Impaired right ankle range of motion and strength.   Functional limitations due to impairments Prolonged standing and walking, hiking, walking up inclines and declines, work activities.   Clinical Presentation Evolving/Changing   Clinical Decision Making (Complexity) Low complexity   Therapy Frequency other (see comments)  (16 visits)   Predicted Duration of Therapy Intervention (days/wks) 12 weeks   Risk & Benefits of therapy have been explained Yes   Patient, Family & other staff in agreement with plan of care Yes   Ortho Goal 1   Goal Identifier Walking   Goal Description Less than 3/10 pain first thing in the morning  upon waking while walking in her home.   Goal Progress N/A   Target Date 08/24/22   Ortho Goal 2   Goal Identifier Prolonged standing   Goal Description Stand for hours of work with less than 2/10 foot and ankle pain at its worst.   Goal Progress N/A   Target Date 08/24/22   Ortho Goal 3   Goal Identifier Hiking   Goal Description Less than 2/10 right foot and ankle pain with hiking 2 hours on outdoor trails.   Goal Progress N/A   Target Date 08/24/22   Total Evaluation Time   PT [place], Low Complexity Minutes (56473) 25   Therapy Certification   Certification date from 06/01/22   Certification date to 08/24/22   Medical Diagnosis M79.671 (ICD-10-CM) - Right foot pain

## 2022-06-03 NOTE — PROGRESS NOTES
[facility]    OUTPATIENT PHYSICAL THERAPY ORTHOPEDIC EVALUATION  PLAN OF TREATMENT FOR OUTPATIENT REHABILITATION  (COMPLETE FOR INITIAL CLAIMS ONLY)  Patient's Last Name, First Name, M.I.  YOB: 1955  Amanda Gomez    Provider s Name:  [facility]   Medical Record No.  [id number]   Start of Care Date:  06/01/22   Onset Date:  05/01/22   Type:     _X__PT   ___OT   ___SLP Medical Diagnosis:  M79.671 (ICD-10-CM) - Right foot pain     PT Diagnosis:  Difficulty with gait and balance secondary to right ankle and foot pain.  Impaired right ankle range of motion and strength.   Visits from SOC:  1      _________________________________________________________________________________  Plan of Treatment/Functional Goals:  balance training, gait training, manual therapy, neuromuscular re-education, ROM, strengthening, stretching  IASTM, MFR, STM  Cryotherapy, Iontophoresis, Ultrasound, Hot packs  Phonophoresis with 10% ketoprofen, iontophoresis with 4% dexamethasone      Goals  Goal Identifier: Walking  Goal Description: Less than 3/10 pain first thing in the morning upon waking while walking in her home.  Target Date: 08/24/22    Goal Identifier: Prolonged standing  Goal Description: Stand for hours of work with less than 2/10 foot and ankle pain at its worst.  Target Date: 08/24/22    Goal Identifier: Hiking  Goal Description: Less than 2/10 right foot and ankle pain with hiking 2 hours on outdoor trails.  Target Date: 08/24/22                                                           Therapy Frequency:  other (see comments) (16 visits)  Predicted Duration of Therapy Intervention:  12 weeks    Dave Torre, PT                 I CERTIFY THE NEED FOR THESE SERVICES FURNISHED UNDER        THIS PLAN OF TREATMENT AND WHILE UNDER MY CARE     (Physician co-signature of this  document indicates review and certification of the therapy plan).                     Certification Date From:  06/01/22   Certification Date To:  08/24/22    Referring Provider:  Rylan Fontaine MD    Initial Assessment        See Epic Evaluation Start of Care Date: 06/01/22

## 2022-06-07 ENCOUNTER — HOSPITAL ENCOUNTER (OUTPATIENT)
Dept: PHYSICAL THERAPY | Facility: OTHER | Age: 67
Setting detail: THERAPIES SERIES
Discharge: HOME | End: 2022-06-07
Attending: FAMILY MEDICINE
Payer: COMMERCIAL

## 2022-06-07 PROCEDURE — 97110 THERAPEUTIC EXERCISES: CPT | Mod: GP | Performed by: PHYSICAL THERAPIST

## 2022-06-07 PROCEDURE — 97035 APP MDLTY 1+ULTRASOUND EA 15: CPT | Mod: GP | Performed by: PHYSICAL THERAPIST

## 2022-06-14 ENCOUNTER — HOSPITAL ENCOUNTER (OUTPATIENT)
Dept: PHYSICAL THERAPY | Facility: OTHER | Age: 67
Setting detail: THERAPIES SERIES
Discharge: HOME | End: 2022-06-14
Attending: FAMILY MEDICINE
Payer: COMMERCIAL

## 2022-06-14 PROCEDURE — 97035 APP MDLTY 1+ULTRASOUND EA 15: CPT | Mod: GP | Performed by: PHYSICAL THERAPIST

## 2022-06-17 ENCOUNTER — HOSPITAL ENCOUNTER (OUTPATIENT)
Dept: PHYSICAL THERAPY | Facility: OTHER | Age: 67
Setting detail: THERAPIES SERIES
Discharge: HOME | End: 2022-06-17
Attending: FAMILY MEDICINE
Payer: COMMERCIAL

## 2022-06-17 PROCEDURE — 97110 THERAPEUTIC EXERCISES: CPT | Mod: GP | Performed by: PHYSICAL THERAPIST

## 2022-06-17 PROCEDURE — 97035 APP MDLTY 1+ULTRASOUND EA 15: CPT | Mod: GP | Performed by: PHYSICAL THERAPIST

## 2022-06-21 ENCOUNTER — TELEPHONE (OUTPATIENT)
Dept: FAMILY MEDICINE | Facility: OTHER | Age: 67
End: 2022-06-21
Payer: COMMERCIAL

## 2022-06-21 ENCOUNTER — HOSPITAL ENCOUNTER (OUTPATIENT)
Dept: PHYSICAL THERAPY | Facility: OTHER | Age: 67
Setting detail: THERAPIES SERIES
Discharge: HOME | End: 2022-06-21
Attending: FAMILY MEDICINE
Payer: COMMERCIAL

## 2022-06-21 PROCEDURE — 97035 APP MDLTY 1+ULTRASOUND EA 15: CPT | Mod: GP | Performed by: PHYSICAL THERAPIST

## 2022-06-21 NOTE — TELEPHONE ENCOUNTER
After verifying patient's name and date of birth, patient stated that PT thinks she needs a RX for bursitis of her right foot.   She stated PT will be in touch with Dr. Fontaine.  I told the patient we will call her back with what Dr. Fontaine wants to do after he talks to PT.  Patient understood.      Roseanne Tolbert LPN....6/21/2022 2:09 PM

## 2022-06-21 NOTE — PROGRESS NOTES
[facility]    Outpatient Physical Therapy Progress Note  Patient: Amanda Gomez  : 1955    Visits:  5    Referring Provider: Dr. Minal YARBROUGH    Therapy Diagnosis: Right achilles and peroneal tendon pain, signs and symptoms consistent with tendinitis/bursitis.       Client Self Report: Patient reports having a 50% reduction in pain up until last weekend.  She developed increase pain on .  Did not do anything different that would have caused the increase that she is aware of.  She is wearing good supportive shoes with arch supports consistently.  The arch pain has been improved; however, the achilles and lateral ankle pain has been difficult to improve.  She is leaving next week for vacation and is concerned she will not be able to tolerate long distance walking.  We discussed her reaching out to her MD and seeing if an oral steriod dose pack would help while she was traveling.  She will be in touch with her MD to discuss.    Objective Measurements:      Antalgic gait  Limited with DF right ankle  Swelling achilles and peroneal tendon region  Palpation:  Swelling and bogginess peroneal tendons, decreased pain in planter fascia region.   Enlarged achilles tendon with signs of inflammation                  Goals:  Not yet met.  Slow progress.  Goal Identifier Walking   Goal Description Less than 3/10 pain first thing in the morning upon waking while walking in her home.   Target Date 22   Date Met      Progress (detail required for progress note):      Goal Identifier Prolonged standing   Goal Description Stand for hours of work with less than 2/10 foot and ankle pain at its worst.   Target Date 22   Date Met      Progress (detail required for progress note):      Goal Identifier Hiking   Goal Description Less than 2/10 right foot and ankle pain with hiking 2 hours on outdoor trails.    Target Date 08/24/22   Date Met      Progress (detail required for progress note):          Plan:  Continue therapy per current plan of care.  Patient will be gone on vacation for 1.5 weeks.  Will resume PT after she returns.      Discharge:  No

## 2022-06-21 NOTE — TELEPHONE ENCOUNTER
Pt states that at PT they suggested that pt get a prescription for steroids.  Please call.    Ramakrishna Gonzalez on 6/21/2022 at 1:12 PM

## 2022-06-22 ENCOUNTER — TELEPHONE (OUTPATIENT)
Dept: FAMILY MEDICINE | Facility: OTHER | Age: 67
End: 2022-06-22

## 2022-06-22 NOTE — TELEPHONE ENCOUNTER
Reason for call: Medication or medication refill    Name of medication requested: Pedisone    Are you out of the medication? Yes     What pharmacy do you use? [facility]    Preferred method for responding to this message: Telephone Call    Phone number patient can be reached at: Home number on file [phone] (home)    If we cannot reach you directly, may we leave a detailed response at the number you provided? Yes     Shiela Campos on 6/22/2022 at 3:28 PM

## 2022-06-24 NOTE — TELEPHONE ENCOUNTER
Patient states she is in Physical Therapy for her right ankle.  Therapist told her she needs a steroid like Medrol or Prednisone for Bursitis. Pain occurs with walking.  Please send to Thi Lowe LPN............6/24/2022 2:19 PM

## 2022-06-24 NOTE — TELEPHONE ENCOUNTER
Patient notified of response per provider and she verbally understood.  Rapid Clinic hours given.    Sharlene Lowe LPN............6/24/2022 2:39 PM

## 2022-07-11 ENCOUNTER — HOSPITAL ENCOUNTER (OUTPATIENT)
Dept: PHYSICAL THERAPY | Facility: OTHER | Age: 67
Setting detail: THERAPIES SERIES
Discharge: HOME | End: 2022-07-11
Attending: FAMILY MEDICINE
Payer: COMMERCIAL

## 2022-07-11 PROCEDURE — 97035 APP MDLTY 1+ULTRASOUND EA 15: CPT | Mod: GP | Performed by: PHYSICAL THERAPIST

## 2022-07-11 PROCEDURE — 97110 THERAPEUTIC EXERCISES: CPT | Mod: GP | Performed by: PHYSICAL THERAPIST

## 2022-09-04 ENCOUNTER — HEALTH MAINTENANCE LETTER (OUTPATIENT)
Age: 67
End: 2022-09-04

## 2022-12-01 DIAGNOSIS — L40.50 PSORIATIC ARTHRITIS (H): ICD-10-CM

## 2022-12-01 DIAGNOSIS — I10 ESSENTIAL HYPERTENSION: ICD-10-CM

## 2022-12-01 NOTE — TELEPHONE ENCOUNTER
Last Prescription Date: 12/21/21  Last Qty/Refills: 200 / 4  Last Office Visit: 5/23/22  Future Office Visit: none      ibuprofen (ADVIL/MOTRIN) 800 MG tablet [Pharmacy Med Name: IBUPROFEN TAB 800MG] 200 tablet 4     Sig: TAKE 1 TABLET EVERY 8 HOURSAS NEEDED FOR MODERATE PAIN     Last Prescription Date: 12/21/21  Last Qty/Refills: 90 / 4  Last Office Visit: 5/23/22  Future Office Visit: none      amLODIPine (NORVASC) 10 MG tablet [Pharmacy Med Name: AMLODIPINE TAB 10MG] 90 tablet 3     Sig: TAKE 1 TABLET DAILY     Last Prescription Date: 12/21/21  Last Qty/Refills: 90 / 3  Last Office Visit: 5/23/22  Future Office Visit: none     Pending Prescriptions Disp Refills     hydrochlorothiazide (HYDRODIURIL) 25 MG tablet [Pharmacy Med Name: HYDROCHLOROT TAB 25MG] 90 tablet 3     Sig: TAKE 1 TABLET ONCE DAILY       Diuretics (Including Combos) Protocol Passed - 12/1/2022  7:07 AM        Passed - Blood pressure under 140/90 in past 12 months     BP Readings from Last 3 Encounters:   05/23/22 126/62   04/05/22 130/64   03/19/22 131/65        Passed - Normal serum creatinine on file in past 12 months     Recent Labs   Lab Test 12/21/21  1403   CR 0.76           Passed - Normal serum potassium on file in past 12 months     Recent Labs   Lab Test 12/21/21  1403   POTASSIUM 3.5           Passed - Normal serum sodium on file in past 12 months     Recent Labs   Lab Test 12/21/21  1403           Recent Labs   Lab Test 12/21/21  1403   ALT 55*             Failed - Normal AST on file in past 12 months     Recent Labs   Lab Test 12/21/21  1403   AST 45*          Recent Labs   Lab Test 12/21/21  1403   WBC 5.0   RBC 4.18   HGB 13.9   HCT 39.3                    Corinne R Thayer, RN on 12/1/2022 at 4:13 PM

## 2022-12-04 RX ORDER — IBUPROFEN 800 MG/1
TABLET, FILM COATED ORAL
Qty: 200 TABLET | Refills: 1 | Status: SHIPPED | OUTPATIENT
Start: 2022-12-04 | End: 2023-04-19

## 2022-12-04 RX ORDER — AMLODIPINE BESYLATE 10 MG/1
TABLET ORAL
Qty: 90 TABLET | Refills: 1 | Status: SHIPPED | OUTPATIENT
Start: 2022-12-04 | End: 2023-06-05

## 2022-12-04 RX ORDER — HYDROCHLOROTHIAZIDE 25 MG/1
TABLET ORAL
Qty: 90 TABLET | Refills: 1 | Status: SHIPPED | OUTPATIENT
Start: 2022-12-04 | End: 2023-06-05

## 2022-12-26 ENCOUNTER — OFFICE VISIT (OUTPATIENT)
Dept: FAMILY MEDICINE | Facility: OTHER | Age: 67
End: 2022-12-26
Attending: NURSE PRACTITIONER
Payer: COMMERCIAL

## 2022-12-26 VITALS
SYSTOLIC BLOOD PRESSURE: 120 MMHG | OXYGEN SATURATION: 97 % | DIASTOLIC BLOOD PRESSURE: 72 MMHG | TEMPERATURE: 98.3 F | RESPIRATION RATE: 20 BRPM | HEART RATE: 86 BPM

## 2022-12-26 DIAGNOSIS — J01.00 ACUTE NON-RECURRENT MAXILLARY SINUSITIS: Primary | ICD-10-CM

## 2022-12-26 PROCEDURE — G0463 HOSPITAL OUTPT CLINIC VISIT: HCPCS

## 2022-12-26 PROCEDURE — 99213 OFFICE O/P EST LOW 20 MIN: CPT

## 2022-12-26 RX ORDER — DOXYCYCLINE 100 MG/1
200 CAPSULE ORAL DAILY
Qty: 14 CAPSULE | Refills: 0 | Status: SHIPPED | OUTPATIENT
Start: 2022-12-26 | End: 2023-01-02

## 2022-12-26 ASSESSMENT — ANXIETY QUESTIONNAIRES
GAD7 TOTAL SCORE: 0
2. NOT BEING ABLE TO STOP OR CONTROL WORRYING: NOT AT ALL
GAD7 TOTAL SCORE: 0
7. FEELING AFRAID AS IF SOMETHING AWFUL MIGHT HAPPEN: NOT AT ALL
6. BECOMING EASILY ANNOYED OR IRRITABLE: NOT AT ALL
3. WORRYING TOO MUCH ABOUT DIFFERENT THINGS: NOT AT ALL
1. FEELING NERVOUS, ANXIOUS, OR ON EDGE: NOT AT ALL
IF YOU CHECKED OFF ANY PROBLEMS ON THIS QUESTIONNAIRE, HOW DIFFICULT HAVE THESE PROBLEMS MADE IT FOR YOU TO DO YOUR WORK, TAKE CARE OF THINGS AT HOME, OR GET ALONG WITH OTHER PEOPLE: NOT DIFFICULT AT ALL
5. BEING SO RESTLESS THAT IT IS HARD TO SIT STILL: NOT AT ALL

## 2022-12-26 ASSESSMENT — PATIENT HEALTH QUESTIONNAIRE - PHQ9
SUM OF ALL RESPONSES TO PHQ QUESTIONS 1-9: 0
5. POOR APPETITE OR OVEREATING: NOT AT ALL

## 2022-12-26 ASSESSMENT — PAIN SCALES - GENERAL: PAINLEVEL: MODERATE PAIN (4)

## 2022-12-26 NOTE — NURSING NOTE
"Patient presents to the clinic for follow up with COVID Positive symptoms.  Employer tested patient and she was positive on 12-16-22.    FOOD SECURITY SCREENING QUESTIONS:    The next two questions are to help us understand your food security.  If you are feeling you need any assistance in this area, we have resources available to support you today.    Hunger Vital Signs:  Within the past 12 months we worried whether our food would run out before we got money to buy more. Never  Within the past 12 months the food we bought just didn't last and we didn't have money to get more. Never    Advance Care Directive on file? no  Advance Care Directive provided to patient? Declined.    Chief Complaint   Patient presents with     Covid Concern     Follow up with symptoms       Initial /72 (BP Location: Left arm, Patient Position: Sitting, Cuff Size: Adult Large)   Pulse 86   Temp 98.3  F (36.8  C) (Tympanic)   Resp 20   LMP  (LMP Unknown)   SpO2 97%  Estimated body mass index is 24.95 kg/m  as calculated from the following:    Height as of 5/23/22: 1.676 m (5' 6\").    Weight as of 5/23/22: 70.1 kg (154 lb 9.6 oz).  Medication Reconciliation: complete        Nasrin Acevedo LPN       "

## 2022-12-26 NOTE — PATIENT INSTRUCTIONS
You have been diagnosed with sinusitis.    doxycycline hyclate (VIBRAMYCIN) 100 MG capsule. Take 2 capsules daily.     Refrain from magnesium use while on the antibiotic.     Take antibiotic with food. Take daily probiotic while on this medication.    Continue saline rinses of nasal passages.     Follow up if symptoms worsen or if they fail to improve after 5 days of treatment.

## 2022-12-26 NOTE — PROGRESS NOTES
ASSESSMENT/PLAN:    Differential Diagnoses:     (J01.00) Acute non-recurrent maxillary sinusitis  (primary encounter diagnosis)  Comment: Patient endorses several days of symptoms with double sickening. Worse in right maxillary sinus. She is allergic to PCN and Cephalosporins. Based on UpToDate empiric recommendations for sinusitis treatment I recommend doxycycline.   Plan: doxycycline hyclate (VIBRAMYCIN) 100 MG capsule    Refrain from magnesium use while on the antibiotic.     Take antibiotic with food. Take daily probiotic while on this medication.    Continue saline rinses of nasal passages.     Symptomatic treatment - Encouraged fluids, salt water gargles, honey (only if greater than 1 year in age due to risk of botulism), elevation, humidifier, sinus rinse/netti pot, lozenges, tea, topical vapor rub, popsicles, rest, etc     May use over-the-counter Tylenol  PRN    Discussed warning signs/symptoms indicative of need to f/u    Follow up if symptoms persist or worsen or concerns    I have reviewed the nursing notes.  I have reviewed the findings, diagnosis, plan and need for follow up with the patient.    I explained my diagnostic considerations and recommendations to the patient, who voiced understanding and agreement with the treatment plan. All questions were answered. We discussed potential side effects of any prescribed or recommended therapies, as well as expectations for response to treatments.    JOCELINE PRESTON CNP  12/26/2022  9:38 AM    HPI:    Amanda Gomez is a 67 year old female  who presents to [facility] today for concerns of sinuses.    She started COVID symptoms on 12/12/22. She had fever, cough, and body aches. She had nasal congestion with COVID and reports that she started to feel better and then experienced double sickening. She started to feel worse again on 12/23/22. She reports low grade fevers with maxillary sinus pain and pressure. Her teeth hurt as well. She also has a  headache. She is still experiencing intermittent cough.     She is allergic to PCN and Cephalosporin. She also cannot have Sulfa antibiotics due to psoriatic arthritis.     PCP: [facility].    No past medical history on file.  No past surgical history on file.  Social History     Tobacco Use     Smoking status: Former     Packs/day: 1.00     Years: 35.00     Pack years: 35.00     Types: Cigarettes     Quit date: 3/4/2017     Years since quittin.8     Smokeless tobacco: Former     Quit date: 2017   Substance Use Topics     Alcohol use: Yes     Comment: occassionally     Current Outpatient Medications   Medication Sig Dispense Refill     acetaminophen (TYLENOL) 500 MG tablet Take 500 mg by mouth every 6 hours as needed for mild pain       acyclovir (ZOVIRAX) 5 % external ointment Apply topically every 3 hours as needed 30 g 3     amLODIPine (NORVASC) 10 MG tablet TAKE 1 TABLET DAILY 90 tablet 1     aspirin 81 MG EC tablet Take 81 mg by mouth daily       calcipotriene (DOVONOX) 0.005 % external ointment Apply topically 2 times daily as needed       calcium carbonate (OS-MORENA) 500 MG tablet Take 1 tablet by mouth daily       cyclobenzaprine (FLEXERIL) 10 MG tablet Take 1 tablet (10 mg) by mouth 3 times daily as needed for muscle spasms 60 tablet 1     etanercept (ENBREL SURECLICK) 50 MG/ML autoinjector Inject 50 mg Subcutaneous once a week        hydrochlorothiazide (HYDRODIURIL) 25 MG tablet TAKE 1 TABLET ONCE DAILY 90 tablet 1     ibuprofen (ADVIL/MOTRIN) 800 MG tablet TAKE 1 TABLET EVERY 8 HOURSAS NEEDED FOR MODERATE PAIN 200 tablet 1     lidocaine (LIDODERM) 5 % patch Place 1 patch onto the skin daily as needed for moderate pain To prevent lidocaine toxicity, patient should be patch free for 12 hrs daily.       magnesium 250 MG tablet Take 2 tablets by mouth daily        mometasone (ELOCON) 0.1 % external cream Apply topically daily as needed       mupirocin (BACTROBAN) 2 % external ointment Apply topically 3  times daily as needed       potassium chloride ER (KLOR-CON M) 20 MEQ CR tablet TAKE 1 PACKET WITH DRINK OF CHOICE TWICE DAILY 180 tablet 1     valACYclovir (VALTREX) 500 MG tablet Take 500 mg by mouth 2 times daily as needed       vitamin D3 (CHOLECALCIFEROL) 2000 units (50 mcg) tablet Take 4 tablets by mouth daily       Allergies   Allergen Reactions     Cephalosporins Hives     Clindamycin Hives     Lisinopril Cough     Penicillins Hives     Varenicline Other (See Comments)     nightmares     Past medical history, past surgical history, current medications and allergies reviewed and accurate to the best of my knowledge.      ROS:  Refer to HPI    /72 (BP Location: Left arm, Patient Position: Sitting, Cuff Size: Adult Large)   Pulse 86   Temp 98.3  F (36.8  C) (Tympanic)   Resp 20   LMP  (LMP Unknown)   SpO2 97%     EXAM:  General Appearance: Well appearing 67 year old female, appropriate appearance for age. No acute distress   Ears: Left TM intact, translucent with bony landmarks appreciated, no erythema, no effusion, no bulging, no purulence.  Right TM intact, translucent with bony landmarks appreciated, no erythema, no effusion, no bulging, no purulence.  Left auditory canal clear.  Right auditory canal clear.  Normal external ears, non tender.  Eyes: conjunctivae normal without erythema or irritation, corneas clear, no drainage or crusting, no eyelid swelling, pupils equal   Oropharynx: moist mucous membranes, posterior pharynx with erythema, no exudates or petechiae, no post nasal drip seen, no trismus, voice clear.    Sinuses:  Bilateral sinus tenderness most prominent on the right maxillary sinus.   Nose:  Bilateral nares: mild erythema and bogginess as well as congestion.   Neck: supple without adenopathy  Respiratory: normal chest wall and respirations.  Normal effort.  Clear to auscultation bilaterally, no wheezing, crackles or rhonchi.  No increased work of breathing.  No cough  appreciated.  Cardiac: RRR with no murmurs  Abdomen: soft, nontender, no rigidity, no rebound tenderness or guarding, normal bowel sounds present  Musculoskeletal:  Equal movement of bilateral upper extremities.  Equal movement of bilateral lower extremities.  Normal gait.    Dermatological: no rashes noted of exposed skin  Neuro: Alert and oriented to person, place, and time.  Cranial nerves II-XII grossly intact with no focal or lateralizing deficits.  Muscle tone normal.  Gait normal. No tremor.   Psychological: normal affect, alert, oriented, and pleasant.

## 2023-04-18 DIAGNOSIS — L40.50 PSORIATIC ARTHRITIS (H): ICD-10-CM

## 2023-04-19 RX ORDER — IBUPROFEN 800 MG/1
TABLET, FILM COATED ORAL
Qty: 200 TABLET | Refills: 0 | Status: SHIPPED | OUTPATIENT
Start: 2023-04-19 | End: 2023-07-03

## 2023-04-19 NOTE — TELEPHONE ENCOUNTER
[facility] sent Rx request for the following:      Requested Prescriptions   Pending Prescriptions Disp Refills     ibuprofen (ADVIL/MOTRIN) 800 MG tablet [Pharmacy Med Name: IBUPROFEN TAB 800MG] 200 tablet 1     Sig: TAKE 1 TABLET EVERY 8 HOURSAS NEEDED FOR MODERATE PAIN       NSAID Medications Failed - 4/19/2023  2:48 PM        Failed - Normal ALT on file in past 12 months     Recent Labs   Lab Test 12/21/21  1403   ALT 55*           Failed - Normal AST on file in past 12 months     Recent Labs   Lab Test 12/21/21  1403   AST 45*           Failed - Patient is age 6-64 years        Failed - Normal CBC on file in past 12 months     Recent Labs   Lab Test 12/21/21  1403   WBC 5.0   RBC 4.18   HGB 13.9   HCT 39.3              Failed - Normal serum creatinine on file in past 12 months     Recent Labs   Lab Test 12/21/21  1403   CR 0.76        Last Prescription Date:   12/4/22  Last Fill Qty/Refills:         200, R-1    Last Office Visit:              5/23/22  Future Office visit:           None    Sharlene Cheng RN .............. 4/19/2023  2:49 PM

## 2023-04-20 ENCOUNTER — OFFICE VISIT (OUTPATIENT)
Dept: INTERNAL MEDICINE | Facility: OTHER | Age: 68
End: 2023-04-20
Attending: INTERNAL MEDICINE
Payer: COMMERCIAL

## 2023-04-20 VITALS
OXYGEN SATURATION: 97 % | RESPIRATION RATE: 16 BRPM | WEIGHT: 157.4 LBS | HEIGHT: 66 IN | TEMPERATURE: 97.2 F | HEART RATE: 103 BPM | BODY MASS INDEX: 25.3 KG/M2 | SYSTOLIC BLOOD PRESSURE: 138 MMHG | DIASTOLIC BLOOD PRESSURE: 80 MMHG

## 2023-04-20 DIAGNOSIS — L40.50 PSORIATIC ARTHRITIS (H): ICD-10-CM

## 2023-04-20 DIAGNOSIS — Z79.899 IMMUNODEFICIENCY DUE TO DRUG THERAPY (H): Primary | ICD-10-CM

## 2023-04-20 DIAGNOSIS — M54.2 NECK PAIN: ICD-10-CM

## 2023-04-20 DIAGNOSIS — D84.821 IMMUNODEFICIENCY DUE TO DRUG THERAPY (H): Primary | ICD-10-CM

## 2023-04-20 DIAGNOSIS — M54.50 ACUTE MIDLINE LOW BACK PAIN WITHOUT SCIATICA: ICD-10-CM

## 2023-04-20 DIAGNOSIS — M70.62 TROCHANTERIC BURSITIS OF LEFT HIP: ICD-10-CM

## 2023-04-20 DIAGNOSIS — Z12.11 SCREENING FOR COLON CANCER: ICD-10-CM

## 2023-04-20 PROCEDURE — 99213 OFFICE O/P EST LOW 20 MIN: CPT | Performed by: INTERNAL MEDICINE

## 2023-04-20 PROCEDURE — G0463 HOSPITAL OUTPT CLINIC VISIT: HCPCS | Performed by: INTERNAL MEDICINE

## 2023-04-20 RX ORDER — CYCLOBENZAPRINE HCL 10 MG
10 TABLET ORAL 3 TIMES DAILY PRN
Qty: 60 TABLET | Refills: 0 | Status: SHIPPED | OUTPATIENT
Start: 2023-04-20

## 2023-04-20 ASSESSMENT — PAIN SCALES - GENERAL: PAINLEVEL: MODERATE PAIN (4)

## 2023-04-20 NOTE — NURSING NOTE
Chief Complaint   Patient presents with     RECHECK     Back Pain      Patient presents to the clinic today for back pain     Medication Reconciliation: completed   Shayy Urena LPN  4/20/2023 3:49 PM

## 2023-04-20 NOTE — PROGRESS NOTES
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  She is to call with any issues.  - cyclobenzaprine (FLEXERIL) 10 MG tablet; Take 1 tablet (10 mg) by mouth 3 times daily as needed for muscle spasms     BMI:   Estimated body mass index is 25.41 kg/m  as calculated from the following:    Height as of this encounter: 1.676 m (5' 6\").    Weight as of this encounter: 71.4 kg (157 lb 6.4 oz).     Follow-up as needed.    Daisy Laughlin, [facility]    [facility]   Katerin is a 67 year old, presenting for the following health issues:  RECHECK (Back Pain /)        4/20/2023     3:49 PM   Additional Questions   Roomed by Shayy FISHER     She has been having pain in the left hip and low back.  She started prendisone Tuesday and is on day 3 of this. She bent down this morning and her pain worsened.    She had a fall 7 weeks ago and has been doing silver sneakers.  She has been icing her back and doing exercises.      History of Present Illness       Back Pain:  She presents for follow up of back pain. Patient's back pain is a new problem.    Original cause of back pain: other  First noticed back pain: in the last week  Patient feels back pain: 2 to 4 times per dayLocation of back pain:  Left lower back and left hip  Description of back pain: burning and sharp  Back pain spreads: left thigh    Since patient first noticed back pain, pain is: always present, but gets better and worse  Does back pain interfere " "with her job:  Yes  On a scale of 1-10 (10 being the worst), patient describes pain as:  5  What makes back pain worse: certain positions, standing and other  Acupuncture: not tried  Acetaminophen: helpful  Activity or exercise: helpful  Chiropractor:  Not tried  Cold: helpful  Heat: helpful  Massage: helpful  Muscle relaxants: not tried  NSAIDS: helpful  Opioids: not tried  Physical Therapy: not tried  Rest: helpful  Steroid Injection: not tried  Stretching: helpful  Surgery: not tried  TENS unit: helpful  Topical pain relievers: helpful  Other healthcare providers patient is seeing for back pain: Other    She eats 4 or more servings of fruits and vegetables daily.She consumes 0 sweetened beverage(s) daily.She exercises with enough effort to increase her heart rate 60 or more minutes per day.  She exercises with enough effort to increase her heart rate 5 days per week.   She is taking medications regularly.    Review of Systems   Denies any fevers, chills        Objective    /80 (BP Location: Right arm, Patient Position: Sitting, Cuff Size: Adult Regular)   Pulse 103   Temp 97.2  F (36.2  C) (Temporal)   Resp 16   Ht 1.676 m (5' 6\")   Wt 71.4 kg (157 lb 6.4 oz)   LMP  (LMP Unknown)   SpO2 97%   BMI 25.41 kg/m    Body mass index is 25.41 kg/m .  Physical Exam   GEN: Vitals reviewed. Healthy appearing. Patient is in no acute distress. Cooperative with exam.  HEENT: Normocephalic atraumatic.  Eyes grossly normal to inspection.  No discharge or erythema, or obvious scleral/conjunctival abnormalities.   LUNGS: No audible wheeze, cough, or visible cyanosis.  No visible retractions or increased work of breathing.    ABD:  nondistended  SKIN: Warm and dry to touch.  Visible skin clear. No significant rash, abnormal pigmentation or lesions.  MSK: No significant gait abnormalities.  Tenderness to palpation of the low back.        "

## 2023-04-26 ENCOUNTER — LAB (OUTPATIENT)
Dept: INTERNAL MEDICINE | Facility: OTHER | Age: 68
End: 2023-04-26
Payer: COMMERCIAL

## 2023-04-26 DIAGNOSIS — Z12.11 SCREENING FOR COLON CANCER: ICD-10-CM

## 2023-05-03 ENCOUNTER — HOSPITAL ENCOUNTER (EMERGENCY)
Facility: OTHER | Age: 68
Discharge: ANOTHER HOSPITAL | End: 2023-05-03
Attending: NURSE PRACTITIONER | Admitting: NURSE PRACTITIONER
Payer: COMMERCIAL

## 2023-05-03 ENCOUNTER — APPOINTMENT (OUTPATIENT)
Dept: CT IMAGING | Facility: OTHER | Age: 68
End: 2023-05-03
Attending: NURSE PRACTITIONER
Payer: COMMERCIAL

## 2023-05-03 ENCOUNTER — APPOINTMENT (OUTPATIENT)
Dept: GENERAL RADIOLOGY | Facility: OTHER | Age: 68
End: 2023-05-03
Attending: NURSE PRACTITIONER
Payer: COMMERCIAL

## 2023-05-03 VITALS
DIASTOLIC BLOOD PRESSURE: 72 MMHG | RESPIRATION RATE: 16 BRPM | HEART RATE: 80 BPM | OXYGEN SATURATION: 97 % | HEIGHT: 66 IN | BODY MASS INDEX: 24.11 KG/M2 | WEIGHT: 150 LBS | TEMPERATURE: 97.5 F | SYSTOLIC BLOOD PRESSURE: 142 MMHG

## 2023-05-03 DIAGNOSIS — I21.4 NSTEMI (NON-ST ELEVATED MYOCARDIAL INFARCTION) (H): ICD-10-CM

## 2023-05-03 DIAGNOSIS — R79.89 ELEVATED TROPONIN: ICD-10-CM

## 2023-05-03 LAB
ANION GAP SERPL CALCULATED.3IONS-SCNC: 17 MMOL/L (ref 7–15)
APTT PPP: 27 SECONDS (ref 22–38)
BUN SERPL-MCNC: 14.5 MG/DL (ref 8–23)
CALCIUM SERPL-MCNC: 9.6 MG/DL (ref 8.8–10.2)
CHLORIDE SERPL-SCNC: 93 MMOL/L (ref 98–107)
CK SERPL-CCNC: 93 U/L (ref 26–192)
CREAT SERPL-MCNC: 0.63 MG/DL (ref 0.51–0.95)
DEPRECATED HCO3 PLAS-SCNC: 19 MMOL/L (ref 22–29)
GFR SERPL CREATININE-BSD FRML MDRD: >90 ML/MIN/1.73M2
GLUCOSE SERPL-MCNC: 167 MG/DL (ref 70–99)
HOLD SPECIMEN: NORMAL
INR PPP: 0.93 (ref 0.85–1.15)
MAGNESIUM SERPL-MCNC: 2 MG/DL (ref 1.7–2.3)
POTASSIUM SERPL-SCNC: 2.7 MMOL/L (ref 3.4–5.3)
SODIUM SERPL-SCNC: 129 MMOL/L (ref 136–145)
TROPONIN T SERPL HS-MCNC: 61 NG/L

## 2023-05-03 PROCEDURE — 250N000011 HC RX IP 250 OP 636: Performed by: NURSE PRACTITIONER

## 2023-05-03 PROCEDURE — 80048 BASIC METABOLIC PNL TOTAL CA: CPT | Performed by: NURSE PRACTITIONER

## 2023-05-03 PROCEDURE — 96368 THER/DIAG CONCURRENT INF: CPT | Performed by: NURSE PRACTITIONER

## 2023-05-03 PROCEDURE — 71045 X-RAY EXAM CHEST 1 VIEW: CPT

## 2023-05-03 PROCEDURE — 96365 THER/PROPH/DIAG IV INF INIT: CPT | Mod: XU | Performed by: NURSE PRACTITIONER

## 2023-05-03 PROCEDURE — 250N000013 HC RX MED GY IP 250 OP 250 PS 637: Performed by: NURSE PRACTITIONER

## 2023-05-03 PROCEDURE — 85730 THROMBOPLASTIN TIME PARTIAL: CPT | Performed by: NURSE PRACTITIONER

## 2023-05-03 PROCEDURE — 74174 CTA ABD&PLVS W/CONTRAST: CPT

## 2023-05-03 PROCEDURE — 258N000003 HC RX IP 258 OP 636: Performed by: NURSE PRACTITIONER

## 2023-05-03 PROCEDURE — 36415 COLL VENOUS BLD VENIPUNCTURE: CPT | Performed by: NURSE PRACTITIONER

## 2023-05-03 PROCEDURE — 83735 ASSAY OF MAGNESIUM: CPT | Performed by: NURSE PRACTITIONER

## 2023-05-03 PROCEDURE — 82550 ASSAY OF CK (CPK): CPT | Performed by: NURSE PRACTITIONER

## 2023-05-03 PROCEDURE — 93010 ELECTROCARDIOGRAM REPORT: CPT | Mod: 76 | Performed by: STUDENT IN AN ORGANIZED HEALTH CARE EDUCATION/TRAINING PROGRAM

## 2023-05-03 PROCEDURE — 84484 ASSAY OF TROPONIN QUANT: CPT | Performed by: NURSE PRACTITIONER

## 2023-05-03 PROCEDURE — 99285 EMERGENCY DEPT VISIT HI MDM: CPT | Performed by: NURSE PRACTITIONER

## 2023-05-03 PROCEDURE — 85610 PROTHROMBIN TIME: CPT | Performed by: NURSE PRACTITIONER

## 2023-05-03 PROCEDURE — 99291 CRITICAL CARE FIRST HOUR: CPT | Mod: 25 | Performed by: NURSE PRACTITIONER

## 2023-05-03 PROCEDURE — 93005 ELECTROCARDIOGRAM TRACING: CPT | Mod: 76 | Performed by: NURSE PRACTITIONER

## 2023-05-03 PROCEDURE — 93005 ELECTROCARDIOGRAM TRACING: CPT | Performed by: NURSE PRACTITIONER

## 2023-05-03 RX ORDER — NITROGLYCERIN 20 MG/100ML
10-200 INJECTION INTRAVENOUS CONTINUOUS
Status: DISCONTINUED | OUTPATIENT
Start: 2023-05-03 | End: 2023-05-04 | Stop reason: HOSPADM

## 2023-05-03 RX ORDER — POTASSIUM CHLORIDE 7.45 MG/ML
10 INJECTION INTRAVENOUS ONCE
Status: COMPLETED | OUTPATIENT
Start: 2023-05-03 | End: 2023-05-03

## 2023-05-03 RX ORDER — ASPIRIN 81 MG/1
324 TABLET, CHEWABLE ORAL ONCE
Status: DISCONTINUED | OUTPATIENT
Start: 2023-05-03 | End: 2023-05-03

## 2023-05-03 RX ORDER — IOPAMIDOL 755 MG/ML
125 INJECTION, SOLUTION INTRAVASCULAR ONCE
Status: COMPLETED | OUTPATIENT
Start: 2023-05-03 | End: 2023-05-03

## 2023-05-03 RX ORDER — NITROGLYCERIN 0.4 MG/1
0.4 TABLET SUBLINGUAL EVERY 5 MIN PRN
Status: DISCONTINUED | OUTPATIENT
Start: 2023-05-03 | End: 2023-05-04 | Stop reason: HOSPADM

## 2023-05-03 RX ORDER — SODIUM CHLORIDE 9 MG/ML
INJECTION, SOLUTION INTRAVENOUS CONTINUOUS
Status: DISCONTINUED | OUTPATIENT
Start: 2023-05-03 | End: 2023-05-04 | Stop reason: HOSPADM

## 2023-05-03 RX ORDER — HEPARIN SODIUM 5000 [USP'U]/.5ML
4000 INJECTION, SOLUTION INTRAVENOUS; SUBCUTANEOUS ONCE
Status: DISCONTINUED | OUTPATIENT
Start: 2023-05-03 | End: 2023-05-04 | Stop reason: HOSPADM

## 2023-05-03 RX ADMIN — SODIUM CHLORIDE: 9 INJECTION, SOLUTION INTRAVENOUS at 21:04

## 2023-05-03 RX ADMIN — NITROGLYCERIN 0.4 MG: 0.4 TABLET SUBLINGUAL at 20:02

## 2023-05-03 RX ADMIN — IOPAMIDOL 125 ML: 755 INJECTION, SOLUTION INTRAVENOUS at 20:21

## 2023-05-03 RX ADMIN — NITROGLYCERIN 10 MCG/MIN: 20 INJECTION INTRAVENOUS at 20:57

## 2023-05-03 RX ADMIN — NITROGLYCERIN 0.4 MG: 0.4 TABLET SUBLINGUAL at 20:23

## 2023-05-03 RX ADMIN — POTASSIUM CHLORIDE 10 MEQ: 7.46 INJECTION, SOLUTION INTRAVENOUS at 21:02

## 2023-05-03 ASSESSMENT — ENCOUNTER SYMPTOMS
APPETITE CHANGE: 1
SHORTNESS OF BREATH: 1
GASTROINTESTINAL NEGATIVE: 1
PSYCHIATRIC NEGATIVE: 1
MUSCULOSKELETAL NEGATIVE: 1
FEVER: 0

## 2023-05-03 ASSESSMENT — ACTIVITIES OF DAILY LIVING (ADL): ADLS_ACUITY_SCORE: 35

## 2023-05-04 LAB
ATRIAL RATE - MUSE: 79 BPM
ATRIAL RATE - MUSE: 89 BPM
DIASTOLIC BLOOD PRESSURE - MUSE: NORMAL MMHG
DIASTOLIC BLOOD PRESSURE - MUSE: NORMAL MMHG
INTERPRETATION ECG - MUSE: NORMAL
INTERPRETATION ECG - MUSE: NORMAL
P AXIS - MUSE: 64 DEGREES
P AXIS - MUSE: 68 DEGREES
PR INTERVAL - MUSE: 204 MS
PR INTERVAL - MUSE: 224 MS
QRS DURATION - MUSE: 104 MS
QRS DURATION - MUSE: 96 MS
QT - MUSE: 404 MS
QT - MUSE: 432 MS
QTC - MUSE: 491 MS
QTC - MUSE: 495 MS
R AXIS - MUSE: 32 DEGREES
R AXIS - MUSE: 39 DEGREES
SYSTOLIC BLOOD PRESSURE - MUSE: NORMAL MMHG
SYSTOLIC BLOOD PRESSURE - MUSE: NORMAL MMHG
T AXIS - MUSE: 71 DEGREES
T AXIS - MUSE: 96 DEGREES
VENTRICULAR RATE- MUSE: 79 BPM
VENTRICULAR RATE- MUSE: 89 BPM

## 2023-05-04 NOTE — ED TRIAGE NOTES
Pt arrives via EMS with c/o chest pain, SOB that started today, states she has not had anything like this before. Pt received 2 sprays of nitroglycerin and 4 baby aspirin en route from EMS. Pt states she thought it might be acid reflux related and tried some antacids for it, without relief.      Triage Assessment     Row Name 05/03/23 1939       Triage Assessment (Adult)    Airway WDL WDL       Respiratory WDL    Respiratory WDL WDL       Skin Circulation/Temperature WDL    Skin Circulation/Temperature WDL WDL       Cardiac WDL    Cardiac WDL X;chest pain       Peripheral/Neurovascular WDL    Peripheral Neurovascular WDL WDL       Cognitive/Neuro/Behavioral WDL    Cognitive/Neuro/Behavioral WDL WDL

## 2023-05-04 NOTE — ED PROVIDER NOTES
History     Chief Complaint   Patient presents with     Chest Pain     HPI  Amanda Gomez is a 67 year old female who presents to ED for evaluation of chest pain that started at 5pm. Patient explains that the pain radiates toward the back into the shoulder blades. She denies having any numbness/tingling to the arm. Patient has a history of hypertension and tobacco use. Elevated blood pressure at 175/92. She received 325 mg of aspirin in route to the ED.     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     Priority: Medium     Acute non-recurrent maxillary sinusitis 10/23/2021     Priority: Medium     Acute midline low back pain without sciatica 08/17/2021     Priority: Medium     Piriformis syndrome, right 04/04/2020     Priority: Medium     Babesiosis 09/04/2019     Priority: Medium     History of dysplastic nevus 12/30/2015     Priority: Medium     Overview:   Mid-upper back 2014, mild       Recurrent cold sores 05/27/2015     Priority: Medium     Osteopenia 03/08/2014     Priority: Medium     Essential hypertension 08/02/2011     Priority: Medium     Positive GABE (antinuclear antibody) 03/27/2008     Priority: Medium     Overview:   2008: Borderline positive dsDNA, SSB / La, and RNP. ? Significance, on Humira.       Psoriatic arthritis (H) 01/08/2008     Priority: Medium     Overview:   Onset ~ 2007; Raptiva (no benefit); MTX trial (2009), Humira ([id number]), Enbrel 4/2016       Psoriasis with pustules 10/28/2007     Priority: Medium     Tobacco use disorder 07/09/2003     Priority: Medium        Past Medical History:    History reviewed. No pertinent past medical history.    Past Surgical History:    History reviewed. No pertinent surgical  "history.    Family History:    Family History   Problem Relation Age of Onset     Rheumatoid Arthritis Other        Social History:  Marital Status:   [4]  Social History     Tobacco Use     Smoking status: Former     Packs/day: 1.00     Years: 35.00     Pack years: 35.00     Types: Cigarettes     Quit date: 3/4/2017     Years since quittin.1     Smokeless tobacco: Former     Quit date: 2017   Vaping Use     Vaping status: Never Used   Substance Use Topics     Alcohol use: Yes     Comment: 15 drinks a week     Drug use: Never        Medications:    acetaminophen (TYLENOL) 500 MG tablet  acyclovir (ZOVIRAX) 5 % external ointment  amLODIPine (NORVASC) 10 MG tablet  aspirin 81 MG EC tablet  calcipotriene (DOVONOX) 0.005 % external ointment  calcium carbonate (OS-MORENA) 500 MG tablet  cyclobenzaprine (FLEXERIL) 10 MG tablet  etanercept (ENBREL SURECLICK) 50 MG/ML autoinjector  hydrochlorothiazide (HYDRODIURIL) 25 MG tablet  ibuprofen (ADVIL/MOTRIN) 800 MG tablet  lidocaine (LIDODERM) 5 % patch  mometasone (ELOCON) 0.1 % external cream  mupirocin (BACTROBAN) 2 % external ointment  potassium chloride ER (KLOR-CON M) 20 MEQ CR tablet  valACYclovir (VALTREX) 500 MG tablet  vitamin D3 (CHOLECALCIFEROL) 2000 units (50 mcg) tablet          Review of Systems   Constitutional: Positive for appetite change. Negative for fever.   HENT: Negative.    Respiratory: Positive for shortness of breath.    Cardiovascular: Positive for chest pain.   Gastrointestinal: Negative.    Genitourinary: Negative.    Musculoskeletal: Negative.    Skin: Negative.    Psychiatric/Behavioral: Negative.        Physical Exam   BP: (!) 175/92  Pulse: 76  Temp: 97.5  F (36.4  C)  Resp: 16  Height: 167.6 cm (5' 6\")  Weight: 68 kg (150 lb)  SpO2: 99 %      Physical Exam  Vitals and nursing note reviewed.   Constitutional:       General: She is in acute distress.      Appearance: She is well-developed and normal weight.   Cardiovascular:      " "Rate and Rhythm: Normal rate and regular rhythm.      Heart sounds: Normal heart sounds. Heart sounds not distant. No murmur heard.      No systolic murmur is present.      No diastolic murmur is present.     No friction rub. No gallop. No S3 or S4 sounds.   Pulmonary:      Effort: Pulmonary effort is normal.      Breath sounds: Normal breath sounds. No decreased breath sounds, wheezing, rhonchi or rales.   Abdominal:      General: Bowel sounds are normal.      Palpations: Abdomen is soft.   Skin:     General: Skin is warm.      Capillary Refill: Capillary refill takes less than 2 seconds.   Neurological:      General: No focal deficit present.      Mental Status: She is alert and oriented to person, place, and time.   Psychiatric:         Mood and Affect: Mood normal.         Behavior: Behavior normal.         ED Course     Amanda Gomez is a 67 year old female who presents to ED for evaluation of chest pain that started at 5pm. Patient explains that the pain radiates toward the back into the shoulder blades. She denies having any numbness/tingling to the arm. Patient has a history of hypertension and tobacco use. Elevated blood pressure at 175/92. She received 325 mg of aspirin in route to the ED. BP (!) 142/72   Pulse 80   Temp 97.5  F (36.4  C)   Resp 16   Ht 1.676 m (5' 6\")   Wt 68 kg (150 lb)   LMP  (LMP Unknown)   SpO2 97%   BMI 24.21 kg/m       EKG showed BARB in lead III, Qwave in lead V1, subtle ST depression in leads V2-6.     8:05 PM  I spoke with interventional cardiologist at [facility]. Given patient's symptoms the recommendation is do a CTA chest STAT to rule out an aortic dissection. If the CT scan is negative. Then this patient should be transferred out as a NSTEMI. If CTA chest is negative for a dissection, then patient should be started on Nitroglycerin and Heparin.     8:33 PM   Patient received 2 oral doses of nitroglycerin 0.4 mg.     Repeat EKG showed more pronounced ST depression " in V2-6 and persistent mild ST elevation in III.  Sodium 129, potassium 2.7, magnesium 2.0, troponin 61, CK 93, INR 0.93, PTT 27. Chest xray was negative for any abnormalities. CTA chest was negative for an acute aortic injury.     8:45 PM  Follow up with Dr. Barreto regarding second EKG. He recommends NTG drip to be started.     8:58 PM  Reviewed case with Dr. Trujillo. She has graciously accepted the patient. Recommendation to start heparin drip.     Heparin drip ordered.     9:50 PM  Patient was discharged and transferred to [place].       Results for orders placed or performed during the hospital encounter of 05/03/23 (from the past 24 hour(s))   Extra Tube ([place] Draw)    Narrative    The following orders were created for panel order Extra Tube ([place] Draw).  Procedure                               Abnormality         Status                     ---------                               -----------         ------                     Extra Blue Top Tube[[id number]]                              Final result               Extra Red Top Tube[[id number]]                               Final result               Extra Green Top (Lithium...[[id number]]                      Final result               Extra Green Top (Lithium...[[id number]]                      Final result               Extra Purple Top Tube[[id number]]                            Final result                 Please view results for these tests on the individual orders.   Extra Blue Top Tube   Result Value Ref Range    Hold Specimen JIC    Extra Red Top Tube   Result Value Ref Range    Hold Specimen JIC    Extra Green Top (Lithium Heparin) Tube   Result Value Ref Range    Hold Specimen JIC    Extra Green Top (Lithium Heparin) Tube   Result Value Ref Range    Hold Specimen JIC    Extra Purple Top Tube   Result Value Ref Range    Hold Specimen JIC    XR Chest Port 1 View    Narrative    PROCEDURE:  XR CHEST PORT 1 VIEW    HISTORY: chest pain. .    COMPARISON:   1/24/2022.    FINDINGS:    The cardiomediastinal contours are stable.  No focal consolidation, effusion or pneumothorax.      Impression    IMPRESSION:  No focal consolidation.      VENKAT RODRIGUES MD         SYSTEM ID:  [id number]   CTA Chest Abdomen Pelvis w Contrast    Narrative    PROCEDURE:  CTA CHEST ABDOMEN PELVIS W CONTRAST.    HISTORY:  Evaluate for pulmonary embolism.  chest pain radiating to  her back. R/O aortic dissection    TECHNIQUE:  Initial pre-contrast  and localizer images were  obtained.  Contrast enhanced helical CT angiography of the chest,  abdomen and pelvis was then performed.  Routine transaxial and  post-processed (multiplanar and/or MIP) reformations were obtained.  This CT exam was performed using one or more the following dose  reduction techniques: automated exposure control, adjustment of the mA  and/or kV according to patient size, and/or iterative reconstruction  technique.    COMPARISON:  1/24/2022    MEDS/CONTRAST: Isotope 370 125ml    CTA FINDINGS:  Scattered atherosclerotic calcifications are present.  No acute aortic injury is identified. No thoracic or abdominal aortic  aneurysm is present. Moderate or severe origin stenosis is seen  involving the origins of the accessory renal arteries bilaterally,  allowing for calcific blooming artifact. Atherosclerotic plaque also  results in mild generalized tibiofemoral narrowing.    OTHER FINDINGS:      Cardiac size is stable. There are atherosclerotic calcifications of  the coronary arteries.  There is no pericardial or pleural effusion.  No abnormal thoracic adenopathy is identified. Debris within the  thoracic esophagus can be seen in reflux or dysmotility.    The pleura is without thickening or effusions. The central airways are  unremarkable. No focal consolidation. A 5 mm fissural nodule is  unchanged.    Is no hydronephrosis. The bowel is normal in caliber. There is slight  thickening of the left adrenal gland. The  bladder is distended.    Osteopenia/osteoporosis Mild inferior L1 vertebral body height loss is  chronic, unchanged when compared to January. No suspicious osseous  lesion is identified.      Impression    IMPRESSION:    No acute aortic injury.    Debris within the thoracic esophagus can be seen in reflux or  dysmotility.    Apparent at least moderate accessory renal artery stenosis. Correlate  with any history of uncontrolled hypertension and consider vascular  follow-up.    Distended bladder.    VENKAT RODRIGUES MD         SYSTEM ID:  [id number]   Basic metabolic panel   Result Value Ref Range    Sodium 129 (L) 136 - 145 mmol/L    Potassium 2.7 (L) 3.4 - 5.3 mmol/L    Chloride 93 (L) 98 - 107 mmol/L    Carbon Dioxide (CO2) 19 (L) 22 - 29 mmol/L    Anion Gap 17 (H) 7 - 15 mmol/L    Urea Nitrogen 14.5 8.0 - 23.0 mg/dL    Creatinine 0.63 0.51 - 0.95 mg/dL    Calcium 9.6 8.8 - 10.2 mg/dL    Glucose 167 (H) 70 - 99 mg/dL    GFR Estimate >90 >60 mL/min/1.73m2   Magnesium   Result Value Ref Range    Magnesium 2.0 1.7 - 2.3 mg/dL   Troponin T, High Sensitivity   Result Value Ref Range    Troponin T, High Sensitivity 61 (H) <=14 ng/L   CK total   Result Value Ref Range    CK 93 26 - 192 U/L   INR   Result Value Ref Range    INR 0.93 0.85 - 1.15   Partial thromboplastin time   Result Value Ref Range    aPTT 27 22 - 38 Seconds   EKG 12-lead, tracing only   Result Value Ref Range    Systolic Blood Pressure  mmHg    Diastolic Blood Pressure  mmHg    Ventricular Rate 89 BPM    Atrial Rate 89 BPM    KY Interval 224 ms    QRS Duration 96 ms     ms    QTc 491 ms    P Axis 64 degrees    R AXIS 32 degrees    T Axis 96 degrees    Interpretation ECG       Sinus rhythm with 1st degree A-V block  ST elevation consider inferior injury or acute infarct  Prolonged QT  ** ** ACUTE MI / STEMI ** **  Consider right ventricular involvement in acute inferior infarct  Abnormal ECG  When compared with ECG of 03-MAY-2023 19:52,  (unconfirmed)  T wave inversion now evident in Anterolateral leads         Medications   sodium chloride 0.9% infusion (0 mLs Intravenous ED Infusing on Admission/transfer 5/3/23 2148)   nitroGLYcerin (NITROSTAT) sublingual tablet 0.4 mg (0.4 mg Sublingual $Given 5/3/23 2023)   nitroGLYcerin 50 mg in D5W 250 mL (adult std) infusion CENTRAL (0 mcg/min Intravenous ED Infusing on Admission/transfer 5/3/23 2147)   heparin ANTICOAGULANT injection 4,000 Units (4,000 Units Intravenous Handoff 5/3/23 2146)   iopamidol (ISOVUE-370) solution 125 mL (125 mLs Intravenous $Given 5/3/23 2021)   potassium chloride 10 mEq in 100 mL sterile water infusion (0 mEq Intravenous ED Infusing on Admission/transfer 5/3/23 2148)       Assessments & Plan (with Medical Decision Making)     I have reviewed the nursing notes.    I have reviewed the findings, diagnosis, plan and need for follow up with the patient.  Transfer to [facility].    Medical Decision Making  The patient's presentation was of moderate complexity (an acute complicated injury).    The patient's evaluation involved:  an assessment requiring an independent historian (see separate area of note for details)  ordering and/or review of 3+ test(s) in this encounter (see separate area of note for details)  discussion of management or test interpretation with another health professional (see separate area of note for details)    The patient's management necessitated high risk (a decision regarding hospitalization).      Discharge Medication List as of 5/3/2023 10:08 PM          Final diagnoses:   NSTEMI (non-ST elevated myocardial infarction) (H)   Elevated troponin       5/3/2023   [facility]     Meagan Thapa NP  05/03/23 1634

## 2023-06-01 DIAGNOSIS — I10 ESSENTIAL HYPERTENSION: ICD-10-CM

## 2023-06-05 RX ORDER — AMLODIPINE BESYLATE 10 MG/1
TABLET ORAL
Qty: 90 TABLET | Refills: 2 | Status: SHIPPED | OUTPATIENT
Start: 2023-06-05

## 2023-06-05 RX ORDER — HYDROCHLOROTHIAZIDE 25 MG/1
TABLET ORAL
Qty: 90 TABLET | Refills: 2 | Status: SHIPPED | OUTPATIENT
Start: 2023-06-05

## 2023-06-05 NOTE — TELEPHONE ENCOUNTER
[facility] sent Rx request for the following:      Requested Prescriptions   Pending Prescriptions Disp Refills     hydrochlorothiazide (HYDRODIURIL) 25 MG tablet [Pharmacy Med Name: HYDROCHLOROT TAB 25MG] 90 tablet 1     Sig: TAKE 1 TABLET ONCE DAILY   Last Prescription Date:   12/4/22  Last Fill Qty/Refills:         90, R-1      Diuretics (Including Combos) Protocol Failed - 6/5/2023  2:06 PM        Failed - Blood pressure under 140/90 in past 12 months     BP Readings from Last 3 Encounters:   05/03/23 (!) 142/72   04/20/23 138/80   12/26/22 120/72           Failed - Normal serum potassium on file in past 12 months     Recent Labs   Lab Test 05/03/23 2023   POTASSIUM 2.7*           Failed - Normal serum sodium on file in past 12 months     Recent Labs   Lab Test 05/03/23 2023   *           amLODIPine (NORVASC) 10 MG tablet [Pharmacy Med Name: AMLODIPINE TAB 10MG] 90 tablet 1     Sig: TAKE 1 TABLET DAILY   Last Prescription Date:   12/4/22  Last Fill Qty/Refills:         90, R-1      Calcium Channel Blockers Protocol  Failed - 6/5/2023  2:06 PM        Failed - Blood pressure under 140/90 in past 12 months     BP Readings from Last 3 Encounters:   05/03/23 (!) 142/72   04/20/23 138/80   12/26/22 120/72        Last Office Visit:              4/20/23   Future Office visit:           None    Sharlene Cheng RN .............. 6/5/2023  2:08 PM

## 2023-07-11 ENCOUNTER — OFFICE VISIT (OUTPATIENT)
Dept: OTOLARYNGOLOGY | Facility: OTHER | Age: 68
End: 2023-07-11
Attending: NURSE PRACTITIONER
Payer: COMMERCIAL

## 2023-07-11 VITALS
SYSTOLIC BLOOD PRESSURE: 130 MMHG | BODY MASS INDEX: 23.14 KG/M2 | TEMPERATURE: 97.6 F | HEIGHT: 66 IN | OXYGEN SATURATION: 99 % | HEART RATE: 84 BPM | WEIGHT: 144 LBS | DIASTOLIC BLOOD PRESSURE: 64 MMHG

## 2023-07-11 DIAGNOSIS — H61.21 IMPACTED CERUMEN OF RIGHT EAR: Primary | ICD-10-CM

## 2023-07-11 PROCEDURE — 69210 REMOVE IMPACTED EAR WAX UNI: CPT | Mod: RT | Performed by: NURSE PRACTITIONER

## 2023-07-11 PROCEDURE — 69210 REMOVE IMPACTED EAR WAX UNI: CPT | Performed by: NURSE PRACTITIONER

## 2023-07-11 RX ORDER — ATORVASTATIN CALCIUM 40 MG/1
20 TABLET, FILM COATED ORAL
COMMUNITY
Start: 2023-05-12

## 2023-07-11 RX ORDER — NITROGLYCERIN 0.4 MG/1
TABLET SUBLINGUAL
COMMUNITY
Start: 2023-05-12

## 2023-07-11 RX ORDER — PANTOPRAZOLE SODIUM 40 MG/1
40 TABLET, DELAYED RELEASE ORAL
COMMUNITY
Start: 2023-05-13

## 2023-07-11 RX ORDER — METOPROLOL SUCCINATE 25 MG/1
50 TABLET, EXTENDED RELEASE ORAL
COMMUNITY
Start: 2023-06-15

## 2023-07-11 RX ORDER — ROSUVASTATIN CALCIUM 20 MG/1
1 TABLET, COATED ORAL DAILY
COMMUNITY
Start: 2023-06-02

## 2023-07-11 RX ORDER — CLOPIDOGREL BISULFATE 75 MG/1
75 TABLET ORAL
COMMUNITY
Start: 2023-05-13

## 2023-07-11 ASSESSMENT — PAIN SCALES - GENERAL: PAINLEVEL: MILD PAIN (3)

## 2023-07-11 NOTE — PATIENT INSTRUCTIONS
Thank you for allowing Mavis Shah and our ENT team to participate in your care.  If your medications are too expensive, please give the nurse a call.  We can possibly change this medication.  If you have a scheduling or an appointment question please contact our Health Unit Coordinator at their direct line [phone].   ALL nursing questions or concerns can be directed to your ENT nurse at: [phone] - ann     Follow up as needed for ear cleaning

## 2023-07-11 NOTE — PROGRESS NOTES
Otolaryngology Note         Chief Complaint:     Patient presents with:  Cerumen Impaction: Ear cleaning            History of Present Illness:     Amanda Gomez is a 68 year old female who presents today for ear cleaning.    She reports wax build up in the right ear for several months.  She was seen in the clinic and bilateral ears flushed with resolution of cerumen impaction on the left, continued cerumen on the right.      She has some pressure and pain in her right ear at times.  No pain today.    She has no concerns with hearing, but feels that the right ear is decreased right now  She has a history of noise exposure for many years, wore HP consistently for all the years.    Occasional non bothersome tinnitus  No vertigo, facial numbness or weakness.      No otorrhea.   No hx of COM or otologic surgeries.   Former smoker with 35 pack year history   Recent CABG           Medications:     Current Outpatient Rx   Medication Sig Dispense Refill     acetaminophen (TYLENOL) 500 MG tablet Take 500 mg by mouth every 6 hours as needed for mild pain       acyclovir (ZOVIRAX) 5 % external ointment Apply topically every 3 hours as needed 30 g 3     amLODIPine (NORVASC) 10 MG tablet TAKE 1 TABLET DAILY 90 tablet 2     aspirin 81 MG EC tablet Take 81 mg by mouth daily       atorvastatin (LIPITOR) 40 MG tablet 20 mg       calcipotriene (DOVONOX) 0.005 % external ointment Apply topically 2 times daily as needed       calcium carbonate (OS-MORENA) 500 MG tablet Take 1 tablet by mouth daily       clopidogrel (PLAVIX) 75 MG tablet Take 75 mg by mouth       cyclobenzaprine (FLEXERIL) 10 MG tablet Take 1 tablet (10 mg) by mouth 3 times daily as needed for muscle spasms 60 tablet 0     etanercept (ENBREL SURECLICK) 50 MG/ML autoinjector Inject 50 mg Subcutaneous once a week        hydrochlorothiazide (HYDRODIURIL) 25 MG tablet TAKE 1 TABLET ONCE DAILY 90 tablet 2     lidocaine (LIDODERM) 5 % patch Place 1 patch onto the skin daily  "as needed for moderate pain To prevent lidocaine toxicity, patient should be patch free for 12 hrs daily.       metoprolol succinate ER (TOPROL XL) 25 MG 24 hr tablet Take 50 mg by mouth       mometasone (ELOCON) 0.1 % external cream Apply topically daily as needed       mupirocin (BACTROBAN) 2 % external ointment Apply topically 3 times daily as needed       nitroGLYcerin (NITROSTAT) 0.4 MG sublingual tablet        pantoprazole (PROTONIX) 40 MG EC tablet Take 40 mg by mouth       potassium chloride ER (KLOR-CON M) 20 MEQ CR tablet TAKE 1 PACKET WITH DRINK OF CHOICE TWICE DAILY 180 tablet 1     rosuvastatin (CRESTOR) 20 MG tablet Take 1 tablet by mouth daily       valACYclovir (VALTREX) 500 MG tablet Take 500 mg by mouth 2 times daily as needed       vitamin D3 (CHOLECALCIFEROL) 2000 units (50 mcg) tablet Take 4 tablets by mouth daily              Allergies:     Allergies: Cephalosporins, Clindamycin, Lisinopril, Penicillins, and Varenicline          Past Medical History:     No past medical history on file.         Past Surgical History:     No past surgical history on file.    ENT family history reviewed         Social History:     Social History     Tobacco Use     Smoking status: Former     Packs/day: 1.00     Years: 35.00     Pack years: 35.00     Types: Cigarettes     Quit date: 3/4/2017     Years since quittin.3     Smokeless tobacco: Former     Quit date: 2017   Vaping Use     Vaping Use: Never used   Substance Use Topics     Alcohol use: Yes     Comment: 15 drinks a week     Drug use: Never            Review of Systems:     ROS: See HPI         Physical Exam:     /64 (BP Location: Right arm, Patient Position: Sitting, Cuff Size: Adult Regular)   Pulse 84   Temp 97.6  F (36.4  C) (Tympanic)   Ht 1.676 m (5' 6\")   Wt 65.3 kg (144 lb)   LMP  (LMP Unknown)   SpO2 99%   BMI 23.24 kg/m      General - The patient is well nourished and well developed, and appears to have good nutritional " status.  Alert and oriented to person and place, answers questions and cooperates with examination appropriately.   Head and Face - Normocephalic and atraumatic, with no gross asymmetry noted.  The facial nerve is intact, with strong symmetric movements.  Voice and Breathing - The patient was breathing comfortably without the use of accessory muscles. There was no wheezing, stridor. The patients voice was clear and strong, and had appropriate pitch and quality.  Ears - The ears were examined with binocular microscopy and with otoscope.  External ears normal. Right canal cerumen impacted.  Left canal patent.  The right ear was cleaned with #7 lazaro.   Right tympanic membrane is intact without effusion, retraction or mass.  Left tympanic membrane is intact without effusion, retraction or mass.  Eyes - Extraocular movements intact, sclera were not icteric or injected, conjunctiva were pink and moist.  Mouth - Examination of the oral cavity showed pink, healthy oral mucosa. Dentition in good condition. No lesions or ulcerations noted. The tongue was mobile and midline.          Assessment and Plan:       ICD-10-CM    1. Impacted cerumen of right ear  H61.21         Follow up as needed for ear cleaning    Mavis Shah NP-C  [facility]

## 2023-07-11 NOTE — Clinical Note
7/11/2023         RE: Amanda Gomez  [id number]  [facility] [address]        Dear Colleague,    Thank you for referring your patient, Amanda Gomez, to the [facility]. Please see a copy of my visit note below.    Otolaryngology Note         Chief Complaint:     Patient presents with:  Cerumen Impaction: Ear cleaning            History of Present Illness:     Amanda Gomez is a 68 year old female who presents today for ear cleaning.    She reports wax build up in the right ear for several months.  She was seen in the clinic and bilateral ears flushed with resolution of cerumen impaction on the left, continued cerumen on the right.      She has some pressure and pain in her right ear at times.  No pain today.    She has no concerns with hearing, but feels that the right ear is decreased right now  She has a history of noise exposure for many years, wore HP consistently for all the years.    Occasional non bothersome tinnitus  No vertigo, facial numbness or weakness.      No otorrhea.   No hx of COM or otologic surgeries.   Former smoker with 35 pack year history   Recent CABG           Medications:     Current Outpatient Rx   Medication Sig Dispense Refill     acetaminophen (TYLENOL) 500 MG tablet Take 500 mg by mouth every 6 hours as needed for mild pain       acyclovir (ZOVIRAX) 5 % external ointment Apply topically every 3 hours as needed 30 g 3     amLODIPine (NORVASC) 10 MG tablet TAKE 1 TABLET DAILY 90 tablet 2     aspirin 81 MG EC tablet Take 81 mg by mouth daily       atorvastatin (LIPITOR) 40 MG tablet 20 mg       calcipotriene (DOVONOX) 0.005 % external ointment Apply topically 2 times daily as needed       calcium carbonate (OS-MORENA) 500 MG tablet Take 1 tablet by mouth daily       clopidogrel (PLAVIX) 75 MG tablet Take 75 mg by mouth       cyclobenzaprine (FLEXERIL) 10 MG tablet Take 1 tablet (10 mg) by mouth 3 times daily as needed for muscle spasms 60 tablet 0      etanercept (ENBREL SURECLICK) 50 MG/ML autoinjector Inject 50 mg Subcutaneous once a week        hydrochlorothiazide (HYDRODIURIL) 25 MG tablet TAKE 1 TABLET ONCE DAILY 90 tablet 2     lidocaine (LIDODERM) 5 % patch Place 1 patch onto the skin daily as needed for moderate pain To prevent lidocaine toxicity, patient should be patch free for 12 hrs daily.       metoprolol succinate ER (TOPROL XL) 25 MG 24 hr tablet Take 50 mg by mouth       mometasone (ELOCON) 0.1 % external cream Apply topically daily as needed       mupirocin (BACTROBAN) 2 % external ointment Apply topically 3 times daily as needed       nitroGLYcerin (NITROSTAT) 0.4 MG sublingual tablet        pantoprazole (PROTONIX) 40 MG EC tablet Take 40 mg by mouth       potassium chloride ER (KLOR-CON M) 20 MEQ CR tablet TAKE 1 PACKET WITH DRINK OF CHOICE TWICE DAILY 180 tablet 1     rosuvastatin (CRESTOR) 20 MG tablet Take 1 tablet by mouth daily       valACYclovir (VALTREX) 500 MG tablet Take 500 mg by mouth 2 times daily as needed       vitamin D3 (CHOLECALCIFEROL) 2000 units (50 mcg) tablet Take 4 tablets by mouth daily              Allergies:     Allergies: Cephalosporins, Clindamycin, Lisinopril, Penicillins, and Varenicline          Past Medical History:     No past medical history on file.         Past Surgical History:     No past surgical history on file.    ENT family history reviewed         Social History:     Social History     Tobacco Use     Smoking status: Former     Packs/day: 1.00     Years: 35.00     Pack years: 35.00     Types: Cigarettes     Quit date: 3/4/2017     Years since quittin.3     Smokeless tobacco: Former     Quit date: 2017   Vaping Use     Vaping Use: Never used   Substance Use Topics     Alcohol use: Yes     Comment: 15 drinks a week     Drug use: Never            Review of Systems:     ROS: See HPI         Physical Exam:     /64 (BP Location: Right arm, Patient Position: Sitting, Cuff Size: Adult Regular)    "Pulse 84   Temp 97.6  F (36.4  C) (Tympanic)   Ht 1.676 m (5' 6\")   Wt 65.3 kg (144 lb)   LMP  (LMP Unknown)   SpO2 99%   BMI 23.24 kg/m      General - The patient is well nourished and well developed, and appears to have good nutritional status.  Alert and oriented to person and place, answers questions and cooperates with examination appropriately.   Head and Face - Normocephalic and atraumatic, with no gross asymmetry noted.  The facial nerve is intact, with strong symmetric movements.  Voice and Breathing - The patient was breathing comfortably without the use of accessory muscles. There was no wheezing, stridor. The patients voice was clear and strong, and had appropriate pitch and quality.  Ears - The ears were examined with binocular microscopy and with otoscope.  External ears normal. Right canal cerumen impacted.  Left canal patent.  The right ear was cleaned with #7 lazaro.   Right tympanic membrane is intact without effusion, retraction or mass.  Left tympanic membrane is intact without effusion, retraction or mass.  Eyes - Extraocular movements intact, sclera were not icteric or injected, conjunctiva were pink and moist.  Mouth - Examination of the oral cavity showed pink, healthy oral mucosa. Dentition in good condition. No lesions or ulcerations noted. The tongue was mobile and midline.          Assessment and Plan:       ICD-10-CM    1. Impacted cerumen of right ear  H61.21         Follow up as needed for ear cleaning    Mavis MORENOC  [facility]        Again, thank you for allowing me to participate in the care of your patient.        Sincerely,        Mavis Shah, PRANAV  "

## 2023-07-18 LAB — NONINV COLON CA DNA+OCC BLD SCRN STL QL: NEGATIVE

## 2024-10-13 ENCOUNTER — NURSE TRIAGE (OUTPATIENT)
Dept: NURSING | Facility: CLINIC | Age: 69
End: 2024-10-13
Payer: COMMERCIAL

## 2024-10-13 NOTE — TELEPHONE ENCOUNTER
Nurse Triage SBAR    Is this a 2nd Level Triage? NO    Situation: Dizziness, diarrhea, vomiting    Background: Dizziness x 4 days. Intermittent diarrhea and vomiting x 4 days.     Assessment: Dizziness is continuous and interferes with her activities of daily living, states she was unable to help at bingo last night. States no diarrhea x 24 hours. Vomiting about twice per day x 4 days. Denies blood in stools or vomit. Denies fever, abdominal pain, signs of dehydration, or weakness/numbness.     Protocol Recommended Disposition:   See PCP Within 24 Hours    Recommendation: See PCP within 24 hours. Red flag symptoms were reviewed with patient.         Reason for Disposition   [1] MODERATE dizziness (e.g., interferes with normal activities) AND [2] has NOT been evaluated by doctor (or NP/PA) for this  (Exception: Dizziness caused by heat exposure, sudden standing, or poor fluid intake.)   [1] MILD or MODERATE vomiting AND [2] present > 48 hours (2 days) (Exception: Mild vomiting with associated diarrhea.)    Additional Information   Negative: SEVERE difficulty breathing (e.g., struggling for each breath, speaks in single words)   Negative: [1] Difficulty breathing or swallowing AND [2] started suddenly after medicine, an allergic food or bee sting   Negative: Shock suspected (e.g., cold/pale/clammy skin, too weak to stand, low BP, rapid pulse)   Negative: Difficult to awaken or acting confused (e.g., disoriented, slurred speech)   Negative: [1] Weakness (i.e., paralysis, loss of muscle strength) of the face, arm or leg on one side of the body AND [2] sudden onset AND [3] present now   Negative: [1] Numbness (i.e., loss of sensation) of the face, arm or leg on one side of the body AND [2] sudden onset AND [3] present now   Negative: [1] Loss of speech or garbled speech AND [2] sudden onset AND [3] present now   Negative: Overdose (accidental or intentional) of medications   Negative: [1] Fainted > 15 minutes ago AND [2]  "still feels too weak or dizzy to stand   Negative: Heart beating < 50 beats per minute OR > 140 beats per minute   Negative: Sounds like a life-threatening emergency to the triager   Negative: Difficulty breathing   Negative: SEVERE dizziness (e.g., unable to stand, requires support to walk, feels like passing out now)   Negative: Extra heartbeats, irregular heart beating, or heart is beating very fast  (i.e., \"palpitations\")   Negative: [1] Drinking very little AND [2] dehydration suspected (e.g., no urine > 12 hours, very dry mouth, very lightheaded)   Negative: [1] Weakness (i.e., paralysis, loss of muscle strength) of the face, arm / hand, or leg / foot on one side of the body AND [2] sudden onset AND [3] brief (now gone)   Negative: [1] Numbness (i.e., loss of sensation) of the face, arm / hand, or leg / foot on one side of the body AND [2] sudden onset AND [3] brief (now gone)   Negative: [1] Loss of speech or garbled speech AND [2] sudden onset AND [3] brief (now gone)   Negative: Loss of vision or double vision  (Exception: Similar to previous migraines.)   Negative: Patient sounds very sick or weak to the triager   Negative: [1] Dizziness caused by heat exposure, sudden standing, or poor fluid intake AND [2] no improvement after 2 hours of rest and fluids   Negative: [1] Fever > 103 F (39.4 C) AND [2] not able to get the fever down using Fever Care Advice   Negative: [1] Fever > 101 F (38.3 C) AND [2] age > 60 years   Negative: [1] Fever > 100.0 F (37.8 C) AND [2] bedridden (e.g., CVA, chronic illness, recovering from surgery)   Negative: [1] Fever > 100.0 F (37.8 C) AND [2] diabetes mellitus or weak immune system (e.g., HIV positive, cancer chemo, splenectomy, organ transplant, chronic steroids)   Negative: Shock suspected (e.g., cold/pale/clammy skin, too weak to stand, low BP, rapid pulse)   Negative: Difficult to awaken or acting confused (e.g., disoriented, slurred speech)   Negative: Sounds like a " "life-threatening emergency to the triager   Negative: [1] SEVERE abdominal pain (e.g., excruciating) AND [2] present > 1 hour   Negative: [1] SEVERE abdominal pain AND [2] age > 60 years   Negative: [1] Blood in the stool AND [2] moderate or large amount of blood   Negative: Black or tarry bowel movements  (Exception: Chronic-unchanged black-grey BMs AND is taking iron pills or Pepto-Bismol.)   Negative: [1] Drinking very little AND [2] dehydration suspected (e.g., no urine > 12 hours, very dry mouth, very lightheaded)   Negative: Patient sounds very sick or weak to the triager   Negative: Shock suspected (e.g., cold/pale/clammy skin, too weak to stand, low BP, rapid pulse)   Negative: Difficult to awaken or acting confused (e.g., disoriented, slurred speech)   Negative: Sounds like a life-threatening emergency to the triager   Negative: [1] Vomiting AND [2] contains red blood or black (\"coffee ground\") material  (Exception: Few red streaks in vomit that only happened once.)   Negative: Severe pain in one eye   Negative: Recent head injury (within last 3 days)   Negative: Recent abdominal injury (within last 3 days)   Negative: [1] Insulin-dependent diabetes (Type I) AND [2] glucose > 400 mg/dl (22 mmol/l)   Negative: [1] Vomiting AND [2] hernia is more painful or swollen than usual   Negative: [1] SEVERE vomiting (e.g., 6 or more times/day) AND [2] present > 8 hours (Exception: Patient sounds well, is drinking liquids, does not sound dehydrated, and vomiting has lasted less than 24 hours.)   Negative: [1] MODERATE vomiting (e.g., 3 - 5 times/day) AND [2] age > 60 years   Negative: Severe headache (e.g., excruciating)  (Exception: Similar to previous migraines.)   Negative: High-risk adult (e.g., diabetes mellitus, brain tumor, V-P shunt, hernia)   Negative: [1] Drinking very little AND [2] dehydration suspected (e.g., no urine > 12 hours, very dry mouth, very lightheaded)   Negative: Patient sounds very sick or " weak to the triager   Negative: [1] Constant abdominal pain AND [2] present > 2 hours   Negative: [1] Fever > 103 F (39.4 C) AND [2] not able to get the fever down using Fever Care Advice   Negative: [1] Fever > 101 F (38.3 C) AND [2] age > 60 years   Negative: [1] Fever > 100.0 F (37.8 C) AND [2] bedridden (e.g., CVA, chronic illness, recovering from surgery)   Negative: [1] Vomiting AND [2] abdomen looks much more swollen than usual   Negative: [1] Vomiting AND [2] contains bile (green color)   Negative: [1] Fever > 100.0 F (37.8 C) AND [2] weak immune system (e.g., HIV positive, cancer chemo, splenectomy, organ transplant, chronic steroids)   Negative: Taking any of the following medications: digoxin (Lanoxin), lithium, theophylline, phenytoin (Dilantin)    Protocols used: Dizziness - [id number]-S-NE, Diarrhea-A-AH, Vomiting-A-AH

## 2024-11-03 ENCOUNTER — HOSPITAL ENCOUNTER (OUTPATIENT)
Dept: GENERAL RADIOLOGY | Facility: OTHER | Age: 69
Discharge: HOME | End: 2024-11-03
Payer: COMMERCIAL

## 2024-11-03 ENCOUNTER — OFFICE VISIT (OUTPATIENT)
Dept: FAMILY MEDICINE | Facility: OTHER | Age: 69
End: 2024-11-03
Payer: COMMERCIAL

## 2024-11-03 VITALS
RESPIRATION RATE: 18 BRPM | DIASTOLIC BLOOD PRESSURE: 68 MMHG | BODY MASS INDEX: 24.27 KG/M2 | HEIGHT: 66 IN | SYSTOLIC BLOOD PRESSURE: 118 MMHG | HEART RATE: 70 BPM | WEIGHT: 151 LBS | TEMPERATURE: 98.3 F | OXYGEN SATURATION: 96 %

## 2024-11-03 DIAGNOSIS — R53.83 FATIGUE, UNSPECIFIED TYPE: ICD-10-CM

## 2024-11-03 DIAGNOSIS — R05.1 ACUTE COUGH: ICD-10-CM

## 2024-11-03 DIAGNOSIS — J01.01 ACUTE RECURRENT MAXILLARY SINUSITIS: Primary | ICD-10-CM

## 2024-11-03 DIAGNOSIS — R50.9 LOW GRADE FEVER: ICD-10-CM

## 2024-11-03 DIAGNOSIS — R42 DIZZINESS: ICD-10-CM

## 2024-11-03 DIAGNOSIS — J34.89 SINUS PRESSURE: ICD-10-CM

## 2024-11-03 DIAGNOSIS — R09.81 NASAL CONGESTION: ICD-10-CM

## 2024-11-03 PROCEDURE — 99213 OFFICE O/P EST LOW 20 MIN: CPT

## 2024-11-03 PROCEDURE — 250N000009 HC RX 250

## 2024-11-03 PROCEDURE — 71046 X-RAY EXAM CHEST 2 VIEWS: CPT

## 2024-11-03 PROCEDURE — G0463 HOSPITAL OUTPT CLINIC VISIT: HCPCS | Mod: 25

## 2024-11-03 RX ORDER — EZETIMIBE 10 MG/1
1 TABLET ORAL DAILY
COMMUNITY
Start: 2024-08-26

## 2024-11-03 RX ORDER — FUROSEMIDE 20 MG/1
40 TABLET ORAL DAILY
COMMUNITY
Start: 2023-12-05

## 2024-11-03 RX ORDER — DULOXETIN HYDROCHLORIDE 30 MG/1
60 CAPSULE, DELAYED RELEASE ORAL DAILY
COMMUNITY
Start: 2024-10-08

## 2024-11-03 RX ORDER — DEXAMETHASONE SODIUM PHOSPHATE 4 MG/ML
12 VIAL (ML) INJECTION ONCE
Status: COMPLETED | OUTPATIENT
Start: 2024-11-03 | End: 2024-11-03

## 2024-11-03 RX ORDER — AZITHROMYCIN 250 MG/1
TABLET, FILM COATED ORAL
Qty: 6 TABLET | Refills: 0 | Status: SHIPPED | OUTPATIENT
Start: 2024-11-03 | End: 2024-11-08

## 2024-11-03 RX ORDER — SERTRALINE HYDROCHLORIDE 25 MG/1
1 TABLET, FILM COATED ORAL DAILY
COMMUNITY
Start: 2023-08-28

## 2024-11-03 RX ADMIN — DEXAMETHASONE SODIUM PHOSPHATE 12 MG: 4 INJECTION, SOLUTION INTRAMUSCULAR; INTRAVENOUS at 12:17

## 2024-11-03 ASSESSMENT — PAIN SCALES - GENERAL: PAINLEVEL_OUTOF10: NO PAIN (0)

## 2024-11-03 NOTE — PROGRESS NOTES
ASSESSMENT/PLAN:    I have reviewed the nursing notes.  I have reviewed the findings, diagnosis, plan and need for follow up with the patient.    1. Sinus pressure  2. Low grade fever  3. Dizziness  4. Acute cough  5. Nasal congestion  6. Fatigue, unspecified type    - XR Chest 2 Views- clear chest x-ray per radiologist    - dexAMETHasone (DECADRON) injectable solution used ORALLY 12 mg- administered in clinic    7. Acute recurrent maxillary sinusitis (Primary)    - azithromycin (ZITHROMAX) 250 MG tablet; Take 2 tablets (500 mg) by mouth daily for 1 day, THEN 1 tablet (250 mg) daily for 4 days.  Dispense: 6 tablet; Refill: 0    - Please read the attached information on sinusitis for at home care treatment.     - Symptomatic treatment - Encouraged fluids, salt water gargles, honey (only if greater than 1 year in age due to risk of botulism), elevation, humidifier, sinus rinse/netti pot, lozenges, tea, topical vapor rub, popsicles, rest, etc     - May use over-the-counter Tylenol as needed for pain or fever    - Discussed warning signs/symptoms indicative of need to f/u    - Follow up if symptoms persist or worsen or concerns    - I explained my diagnostic considerations and recommendations to the patient, who voiced understanding and agreement with the treatment plan. All questions were answered. We discussed potential side effects of any prescribed or recommended therapies, as well as expectations for response to treatments.    JOCELINE Carlisle CNP  11/3/2024  11:44 AM    HPI:    Amanda Gomez is a 69 year old female who presents to [facility] today for concerns of of an upper respiratory infection.  Patient states that she has had a low-grade fever and chills for the past 3 weeks on and off.  Other symptoms consist of dizziness, buzzing sound in ears, cough, congestion, rhinorrhea with green discharge that is foul-smelling, sinus pressure, fatigue, and diarrhea.  At home care treatment consists of sauna,  hot wash cloth on face, Netti pot, expectorant cough medication, humidifier, ASA for fever, rest, and fluids.  Denies shortness of breath, chest pain, lightheadedness, headache, sore throat, nausea, vomiting, constipation, otalgia or rash.    History reviewed. No pertinent past medical history.  History reviewed. No pertinent surgical history.  Social History     Tobacco Use    Smoking status: Former     Current packs/day: 0.00     Average packs/day: 1 pack/day for 35.0 years (35.0 ttl pk-yrs)     Types: Cigarettes     Start date: 3/4/1982     Quit date: 3/4/2017     Years since quittin.6     Passive exposure: Past    Smokeless tobacco: Former     Quit date: 2017   Substance Use Topics    Alcohol use: Yes     Comment: 15 drinks a week     Current Outpatient Medications   Medication Sig Dispense Refill    acetaminophen (TYLENOL) 500 MG tablet Take 500 mg by mouth every 6 hours as needed for mild pain      acyclovir (ZOVIRAX) 5 % external ointment Apply topically every 3 hours as needed 30 g 3    aspirin 81 MG EC tablet Take 81 mg by mouth daily      atorvastatin (LIPITOR) 40 MG tablet 20 mg      calcipotriene (DOVONOX) 0.005 % external ointment Apply topically 2 times daily as needed      calcium carbonate (OS-MORENA) 500 MG tablet Take 1 tablet by mouth daily      DULoxetine (CYMBALTA) 30 MG capsule Take 60 mg by mouth daily.      ezetimibe (ZETIA) 10 MG tablet Take 1 tablet by mouth daily.      furosemide (LASIX) 20 MG tablet Take 40 mg by mouth daily.      golimumab (SIMPONI) 50 MG/0.5ML pre-filled syringe injection Inject 50 mg subcutaneously once every eight weeks.      lidocaine (LIDODERM) 5 % patch Place 1 patch onto the skin daily as needed for moderate pain To prevent lidocaine toxicity, patient should be patch free for 12 hrs daily.      metoprolol succinate ER (TOPROL XL) 25 MG 24 hr tablet Take 50 mg by mouth      mometasone (ELOCON) 0.1 % external cream Apply topically daily as needed       "mupirocin (BACTROBAN) 2 % external ointment Apply topically 3 times daily as needed      nitroGLYcerin (NITROSTAT) 0.4 MG sublingual tablet       rosuvastatin (CRESTOR) 20 MG tablet Take 1 tablet by mouth daily      sertraline (ZOLOFT) 25 MG tablet Take 1 tablet by mouth daily.      valACYclovir (VALTREX) 500 MG tablet Take 500 mg by mouth 2 times daily as needed      vitamin D3 (CHOLECALCIFEROL) 2000 units (50 mcg) tablet Take 4 tablets by mouth daily      amLODIPine (NORVASC) 10 MG tablet TAKE 1 TABLET DAILY (Patient not taking: Reported on 11/3/2024) 90 tablet 2    clopidogrel (PLAVIX) 75 MG tablet Take 75 mg by mouth (Patient not taking: Reported on 11/3/2024)      cyclobenzaprine (FLEXERIL) 10 MG tablet Take 1 tablet (10 mg) by mouth 3 times daily as needed for muscle spasms (Patient not taking: Reported on 11/3/2024) 60 tablet 0    etanercept (ENBREL SURECLICK) 50 MG/ML autoinjector Inject 50 mg Subcutaneous once a week  (Patient not taking: Reported on 11/3/2024)      hydrochlorothiazide (HYDRODIURIL) 25 MG tablet TAKE 1 TABLET ONCE DAILY (Patient not taking: Reported on 11/3/2024) 90 tablet 2    pantoprazole (PROTONIX) 40 MG EC tablet Take 40 mg by mouth (Patient not taking: Reported on 11/3/2024)      potassium chloride ER (KLOR-CON M) 20 MEQ CR tablet TAKE 1 PACKET WITH DRINK OF CHOICE TWICE DAILY (Patient not taking: Reported on 11/3/2024) 180 tablet 1     Allergies   Allergen Reactions    Cephalosporins Hives    Clindamycin Hives    Lisinopril Cough    Penicillins Hives    Varenicline Other (See Comments)     nightmares     Past medical history, past surgical history, current medications and allergies reviewed and accurate to the best of my knowledge.      ROS:  Refer to HPI    /68 (BP Location: Left arm, Patient Position: Chair, Cuff Size: Adult Regular)   Pulse 70   Temp 98.3  F (36.8  C) (Tympanic)   Resp 18   Ht 1.676 m (5' 6\")   Wt 68.5 kg (151 lb)   LMP  (LMP Unknown)   SpO2 96%   " Breastfeeding No   BMI 24.37 kg/m      EXAM:  General Appearance: Well appearing 69 year old female, appropriate appearance for age. No acute distress   Ears: Left TM intact, translucent with bony landmarks appreciated, no erythema, no effusion, no bulging, no purulence.  Right TM intact, translucent with bony landmarks appreciated, no erythema, no effusion, no bulging, no purulence.  Left auditory canal clear.  Right auditory canal clear.  Normal external ears, non tender.  Eyes: conjunctivae normal without erythema or irritation, corneas clear, no drainage or crusting, no eyelid swelling, pupils equal   Oropharynx: moist mucous membranes, posterior pharynx without erythema, tonsils symmetric, no erythema, no exudates or petechiae, no post nasal drip seen, no trismus, voice clear.    Sinuses:  Sinus tenderness upon palpation of the frontal and maxillary sinuses  Nose:  Bilateral nares: no erythema, no edema, no drainage, + congestion   Neck: supple without adenopathy  Respiratory: normal chest wall and respirations.  Normal effort.  Clear to auscultation bilaterally, no wheezing, crackles or rhonchi.  No increased work of breathing.  Productive cough green mucus appreciated.  Cardiac: RRR with no murmurs  Musculoskeletal:  Equal movement of bilateral upper extremities.  Equal movement of bilateral lower extremities.  Normal gait.    Neuro: Alert and oriented to person, place, and time.    Psychological: normal affect, alert, oriented, and pleasant.     Xray:  Results for orders placed or performed in visit on 11/03/24   XR Chest 2 Views     Status: None    Narrative    PROCEDURE:  XR CHEST 2 VIEWS    HISTORY:  Low grade fever; Dizziness; Acute cough; Fatigue,  unspecified type.     COMPARISON:  None.    FINDINGS:   The cardiac silhouette is normal in size. The pulmonary vasculature is  normal.  The lungs are clear. No pleural effusion or pneumothorax.      Impression    IMPRESSION:  No acute cardiopulmonary  disease.      MARY ARGUETA MD         SYSTEM ID:  [id number]

## 2024-11-03 NOTE — NURSING NOTE
"Chief Complaint   Patient presents with    URI     Upper Respiratory Sx, Slight Fever x 3 Weeks        Initial /68 (BP Location: Left arm, Patient Position: Chair, Cuff Size: Adult Regular)   Pulse 70   Temp 98.3  F (36.8  C) (Tympanic)   Resp 18   Ht 1.676 m (5' 6\")   Wt 68.5 kg (151 lb)   LMP  (LMP Unknown)   SpO2 96%   Breastfeeding No   BMI 24.37 kg/m   Estimated body mass index is 24.37 kg/m  as calculated from the following:    Height as of this encounter: 1.676 m (5' 6\").    Weight as of this encounter: 68.5 kg (151 lb).      Medication Reconciliation: Complete.       Lidia Thakur LPN on 11/3/2024 at 11:41 AM     "

## 2024-11-23 ENCOUNTER — HEALTH MAINTENANCE LETTER (OUTPATIENT)
Age: 69
End: 2024-11-23

## 2025-07-24 ENCOUNTER — OFFICE VISIT (OUTPATIENT)
Dept: FAMILY MEDICINE | Facility: OTHER | Age: 70
End: 2025-07-24
Payer: COMMERCIAL

## 2025-07-24 VITALS
DIASTOLIC BLOOD PRESSURE: 70 MMHG | WEIGHT: 151 LBS | RESPIRATION RATE: 18 BRPM | BODY MASS INDEX: 24.27 KG/M2 | SYSTOLIC BLOOD PRESSURE: 126 MMHG | TEMPERATURE: 97.4 F | HEART RATE: 64 BPM | HEIGHT: 66 IN | OXYGEN SATURATION: 97 %

## 2025-07-24 DIAGNOSIS — R50.9 LOW GRADE FEVER: ICD-10-CM

## 2025-07-24 DIAGNOSIS — N39.0 ACUTE UTI (URINARY TRACT INFECTION): Primary | ICD-10-CM

## 2025-07-24 DIAGNOSIS — R39.9 UTI SYMPTOMS: ICD-10-CM

## 2025-07-24 LAB
ALBUMIN UR-MCNC: 30 MG/DL
APPEARANCE UR: ABNORMAL
BILIRUB UR QL STRIP: NEGATIVE
COLOR UR AUTO: YELLOW
GLUCOSE UR STRIP-MCNC: NEGATIVE MG/DL
HGB UR QL STRIP: ABNORMAL
HYALINE CASTS: 14 /LPF
KETONES UR STRIP-MCNC: NEGATIVE MG/DL
LEUKOCYTE ESTERASE UR QL STRIP: ABNORMAL
MUCOUS THREADS #/AREA URNS LPF: PRESENT /LPF
NITRATE UR QL: NEGATIVE
PH UR STRIP: 5.5 [PH] (ref 5–9)
RBC URINE: 51 /HPF
SP GR UR STRIP: 1.02 (ref 1–1.03)
SQUAMOUS EPITHELIAL: 1 /HPF
UROBILINOGEN UR STRIP-MCNC: NORMAL MG/DL
WBC URINE: >182 /HPF

## 2025-07-24 PROCEDURE — G0463 HOSPITAL OUTPT CLINIC VISIT: HCPCS

## 2025-07-24 PROCEDURE — 81001 URINALYSIS AUTO W/SCOPE: CPT | Mod: ZL

## 2025-07-24 RX ORDER — CIPROFLOXACIN 500 MG/1
500 TABLET, FILM COATED ORAL 2 TIMES DAILY
Qty: 10 TABLET | Refills: 0 | Status: SHIPPED | OUTPATIENT
Start: 2025-07-24 | End: 2025-07-29

## 2025-07-24 ASSESSMENT — PAIN SCALES - GENERAL: PAINLEVEL_OUTOF10: MILD PAIN (2)

## 2025-07-24 NOTE — PATIENT INSTRUCTIONS
Female Urinary Tract Infection (UTI): Care Instructions  Overview    A urinary tract infection (UTI) is an infection caused by bacteria. It can happen anywhere in the urinary tract. A UTI can happen in the:  Kidneys.  Ureters, the tubes that connect the kidneys to the bladder.  Bladder.  Urethra, where the urine comes out.  Most UTIs are bladder infections. They often cause pain or burning when you urinate.  Most UTIs can be cured with antibiotics. If you are prescribed antibiotics, be sure to complete your treatment so that the infection does not get worse.  Follow-up care is a key part of your treatment and safety. Be sure to make and go to all appointments, and call your doctor if you are having problems. It's also a good idea to know your test results and keep a list of the medicines you take.  How can you care for yourself at home?  Take your antibiotics as directed. Do not stop taking them just because you feel better. You need to take the full course of antibiotics.  Drink extra water and other fluids for the next day or two. This will help make the urine less concentrated and help wash out the bacteria that are causing the infection. (If you have kidney, heart, or liver disease and have to limit fluids, talk with your doctor before you increase the amount of fluids you drink.)  Avoid drinks that are carbonated or have caffeine. They can irritate the bladder.  Urinate often. Try to empty your bladder each time.  To relieve pain, try taking a warm bath in plain water or laying a heating pad set on low over your lower belly or genital area. Never go to sleep with a heating pad in place.  Avoid bubble baths and hygiene sprays, powders, or perfumes in the vagina or on the vulva. Do not douche. These things can irritate the urethra and may make symptoms worse.  To help prevent UTIs  Drink plenty of water each day. This helps you urinate often, which clears bacteria from your system. (If you have kidney, heart, or  "liver disease and have to limit fluids, talk with your doctor before you increase the amount of fluids you drink.)  Urinate when you need to.  If you are sexually active, urinate right after you have sex.  Change sanitary pads often.  After going to the bathroom, wipe from front to back.  When should you call for help?  Contact your doctor now or seek immediate medical care if:  Symptoms such as a fever, chills, nausea, or vomiting get worse or happen for the first time.  You have new pain in your back just below your rib cage. This is called flank pain.  There is new blood or pus in your urine.  You are not able to take or keep down your antibiotics.  Watch closely for changes in your health, and be sure to contact your doctor if:  You are not getting better after taking an antibiotic for 2 days.  Your symptoms go away but then come back.  Where can you learn more?  Go to https://www.[employer]/patiented  Enter K848 in the search box to learn more about \"Female Urinary Tract Infection (UTI): Care Instructions.\"  Current as of: April 30, 2024  Content Version: 14.5    6166-6573 [employer].   Care instructions adapted under license by your healthcare professional. If you have questions about a medical condition or this instruction, always ask your healthcare professional. [employer] disclaims any warranty or liability for your use of this information.    "

## 2025-07-24 NOTE — PROGRESS NOTES
"Chief Complaint   Patient presents with    UTI   Patient is here to be seen for a bladder infection.    FOOD SECURITY SCREENING QUESTIONS  Hunger Vital Signs:  Within the past 12 months we worried whether our food would run out before we got money to buy more. Never  Within the past 12 months the food we bought just didn't last and we didn't have money to get more. Never  Yanelis Martinez 7/24/2025 1:49 PM      Initial /70 (BP Location: Left arm, Patient Position: Sitting, Cuff Size: Adult Regular)   Pulse 64   Temp 97.4  F (36.3  C) (Tympanic)   Resp 18   Ht 1.676 m (5' 6\")   Wt 68.5 kg (151 lb)   LMP  (LMP Unknown)   SpO2 97%   BMI 24.37 kg/m   Estimated body mass index is 24.37 kg/m  as calculated from the following:    Height as of this encounter: 1.676 m (5' 6\").    Weight as of this encounter: 68.5 kg (151 lb).  Medication Reconciliation: complete    Yanelis Martinez   "

## 2025-07-24 NOTE — PROGRESS NOTES
ASSESSMENT/PLAN:    I have reviewed the nursing notes.  I have reviewed the findings, diagnosis, plan and need for follow up with the patient.    1. Acute UTI (urinary tract infection) (Primary)  2. UTI symptoms  3. Low grade fever  - UA with Microscopic reflex to Culture  - Urine Culture  - ciprofloxacin (CIPRO) 500 MG tablet; Take 1 tablet (500 mg) by mouth 2 times daily for 5 days.  Dispense: 10 tablet; Refill: 0    Patient presents with urinary symptoms with systemic symptoms including a low-grade fever.  Vitals are currently urinalysis was positive for a large amount of leukocyte esterase and white blood cells. Will treat for a UTI and possible pyelonephritis with ciprofloxacin.  Urine culture is pending and patient be notified of the results, antibiotics will be change if needed based off of culture and sensitivity.  Advised patient to push fluids and may take Tylenol and ibuprofen as needed.    Discussed warning signs/symptoms indicative of need to f/u    Follow up if symptoms persist or worsen or concerns    I explained my diagnostic considerations and recommendations to the patient, who voiced understanding and agreement with the treatment plan. All questions were answered. We discussed potential side effects of any prescribed or recommended therapies, as well as expectations for response to treatments.    JOCELINE Malhotra CNP  7/24/2025  1:54 PM    HPI:    Amanda Gomez is a 70 year old female  who presents to [facility] today for concerns of urinary symptoms    Patient presents with concerns of possible UTI, x 1 day    Symptoms: dysuria, urgency, frequency, burning, back pain, fever, and voiding in small amounts  Flank Pain or Back Pain: Yes  Blood in Urine: Yes  Last Urination: in clinic  Able to Completely Urinate/Void: Yes  Prior UTIs: Yes  Exposures to STIs/STDs: No  Fevers, chills, N/V/D: Yes: fever  Change in Bowel Habits: No  Vaginal Symptoms or Discharge: No  Recent swimming/use of hot  tubs/swimming pools/lakes: No    Treatments tried: Azo cranberry supplement    Allergies: reviewed    PCP: [facility]    History reviewed. No pertinent past medical history.  History reviewed. No pertinent surgical history.  Social History     Tobacco Use    Smoking status: Former     Current packs/day: 0.00     Average packs/day: 1 pack/day for 35.0 years (35.0 ttl pk-yrs)     Types: Cigarettes     Start date: 3/4/1982     Quit date: 3/4/2017     Years since quittin.3     Passive exposure: Past    Smokeless tobacco: Former     Quit date: 2017   Substance Use Topics    Alcohol use: Yes     Comment: 15 drinks a week     Current Outpatient Medications   Medication Sig Dispense Refill    acetaminophen (TYLENOL) 500 MG tablet Take 500 mg by mouth every 6 hours as needed for mild pain      acyclovir (ZOVIRAX) 5 % external ointment Apply topically every 3 hours as needed 30 g 3    amLODIPine (NORVASC) 10 MG tablet TAKE 1 TABLET DAILY 90 tablet 2    aspirin 81 MG EC tablet Take 81 mg by mouth daily      atorvastatin (LIPITOR) 40 MG tablet 20 mg      calcipotriene (DOVONOX) 0.005 % external ointment Apply topically 2 times daily as needed      calcium carbonate (OS-MORENA) 500 MG tablet Take 1 tablet by mouth daily      DULoxetine (CYMBALTA) 30 MG capsule Take 60 mg by mouth daily.      ezetimibe (ZETIA) 10 MG tablet Take 1 tablet by mouth daily.      furosemide (LASIX) 20 MG tablet Take 40 mg by mouth daily.      golimumab (SIMPONI) 50 MG/0.5ML pre-filled syringe injection Inject 50 mg subcutaneously once every eight weeks.      lidocaine (LIDODERM) 5 % patch Place 1 patch onto the skin daily as needed for moderate pain To prevent lidocaine toxicity, patient should be patch free for 12 hrs daily.      metoprolol succinate ER (TOPROL XL) 25 MG 24 hr tablet Take 50 mg by mouth      mometasone (ELOCON) 0.1 % external cream Apply topically daily as needed      mupirocin (BACTROBAN) 2 % external ointment Apply topically 3  "times daily as needed      nitroGLYcerin (NITROSTAT) 0.4 MG sublingual tablet       rosuvastatin (CRESTOR) 20 MG tablet Take 1 tablet by mouth daily      sertraline (ZOLOFT) 25 MG tablet Take 1 tablet by mouth daily.      valACYclovir (VALTREX) 500 MG tablet Take 500 mg by mouth 2 times daily as needed      vitamin D3 (CHOLECALCIFEROL) 2000 units (50 mcg) tablet Take 4 tablets by mouth daily      clopidogrel (PLAVIX) 75 MG tablet Take 75 mg by mouth (Patient not taking: Reported on 7/24/2025)      cyclobenzaprine (FLEXERIL) 10 MG tablet Take 1 tablet (10 mg) by mouth 3 times daily as needed for muscle spasms (Patient not taking: Reported on 7/24/2025) 60 tablet 0    etanercept (ENBREL SURECLICK) 50 MG/ML autoinjector Inject 50 mg Subcutaneous once a week  (Patient not taking: Reported on 7/24/2025)      hydrochlorothiazide (HYDRODIURIL) 25 MG tablet TAKE 1 TABLET ONCE DAILY (Patient not taking: Reported on 7/24/2025) 90 tablet 2    pantoprazole (PROTONIX) 40 MG EC tablet Take 40 mg by mouth (Patient not taking: Reported on 7/24/2025)      potassium chloride ER (KLOR-CON M) 20 MEQ CR tablet TAKE 1 PACKET WITH DRINK OF CHOICE TWICE DAILY (Patient not taking: Reported on 7/24/2025) 180 tablet 1     Allergies   Allergen Reactions    Cephalosporins Hives    Clindamycin Hives    Lisinopril Cough    Penicillins Hives    Varenicline Other (See Comments)     nightmares     Past medical history, past surgical history, current medications and allergies reviewed and accurate to the best of my knowledge.      ROS:  Refer to HPI    /70 (BP Location: Left arm, Patient Position: Sitting, Cuff Size: Adult Regular)   Pulse 64   Temp 97.4  F (36.3  C) (Tympanic)   Resp 18   Ht 1.676 m (5' 6\")   Wt 68.5 kg (151 lb)   LMP  (LMP Unknown)   SpO2 97%   BMI 24.37 kg/m      EXAM:  General Appearance: Well appearing 70 year old female, appropriate appearance for age. No acute distress   Respiratory: normal chest wall and " respirations.  Normal effort.  Clear to auscultation bilaterally, no wheezing, crackles or rhonchi.  No increased work of breathing.  No cough appreciated.  Cardiac: RRR with no murmurs  :  No suprapubic tenderness to palpation.  Present mild CVA tenderness to palpation.    Neuro: Alert and oriented to person, place, and time.    Psychological: normal affect, alert, oriented, and pleasant.     Labs:  Results for orders placed or performed in visit on 07/24/25   UA with Microscopic reflex to Culture     Status: Abnormal    Specimen: Urine, Clean Catch   Result Value Ref Range    Color Urine Yellow Colorless, Straw, Light Yellow, Yellow    Appearance Urine Cloudy (A) Clear    Glucose Urine Negative Negative mg/dL    Bilirubin Urine Negative Negative    Ketones Urine Negative Negative mg/dL    Specific Gravity Urine 1.024 1.000 - 1.030    Blood Urine Moderate (A) Negative    pH Urine 5.5 5.0 - 9.0    Protein Albumin Urine 30 (A) Negative mg/dL    Urobilinogen Urine Normal Normal mg/dL    Nitrite Urine Negative Negative    Leukocyte Esterase Urine Large (A) Negative    Mucus Urine Present (A) None Seen /LPF    RBC Urine 51 (H) <=2 /HPF    WBC Urine >182 (H) <=5 /HPF    Squamous Epithelials Urine 1 <=1 /HPF    Hyaline Casts Urine 14 (H) <=2 /LPF    Narrative    Urine Culture ordered based on laboratory criteria

## 2025-07-26 LAB — BACTERIA UR CULT: ABNORMAL

## (undated) RX ORDER — CYCLOBENZAPRINE HCL 10 MG
TABLET ORAL
Status: DISPENSED
Start: 2022-03-19

## (undated) RX ORDER — POTASSIUM CHLORIDE 1500 MG/1
TABLET, EXTENDED RELEASE ORAL
Status: DISPENSED
Start: 2019-08-11

## (undated) RX ORDER — POTASSIUM CHLORIDE 7.45 MG/ML
INJECTION INTRAVENOUS
Status: DISPENSED
Start: 2023-05-03

## (undated) RX ORDER — POTASSIUM CHLORIDE 7.45 MG/ML
INJECTION INTRAVENOUS
Status: DISPENSED
Start: 2019-08-11

## (undated) RX ORDER — SODIUM CHLORIDE 9 MG/ML
INJECTION, SOLUTION INTRAVENOUS
Status: DISPENSED
Start: 2019-08-11

## (undated) RX ORDER — DEXAMETHASONE SODIUM PHOSPHATE 4 MG/ML
INJECTION, SOLUTION INTRA-ARTICULAR; INTRALESIONAL; INTRAMUSCULAR; INTRAVENOUS; SOFT TISSUE
Status: DISPENSED
Start: 2024-11-03

## (undated) RX ORDER — NITROGLYCERIN 20 MG/100ML
INJECTION INTRAVENOUS
Status: DISPENSED
Start: 2023-05-03

## (undated) RX ORDER — DOXYCYCLINE 100 MG/10ML
INJECTION, POWDER, LYOPHILIZED, FOR SOLUTION INTRAVENOUS
Status: DISPENSED
Start: 2019-08-11

## (undated) RX ORDER — HEPARIN SODIUM 10000 [USP'U]/100ML
INJECTION, SOLUTION INTRAVENOUS
Status: DISPENSED
Start: 2023-05-03

## (undated) RX ORDER — DEXAMETHASONE SODIUM PHOSPHATE 10 MG/ML
INJECTION, SOLUTION INTRAMUSCULAR; INTRAVENOUS
Status: DISPENSED
Start: 2022-03-19